# Patient Record
Sex: MALE | Race: WHITE | Employment: OTHER | RURAL
[De-identification: names, ages, dates, MRNs, and addresses within clinical notes are randomized per-mention and may not be internally consistent; named-entity substitution may affect disease eponyms.]

---

## 2017-04-04 ENCOUNTER — HOSPITAL ENCOUNTER (OUTPATIENT)
Dept: SPEECH THERAPY | Age: 72
Discharge: HOME | End: 2017-04-30
Admitting: NURSE PRACTITIONER

## 2017-04-12 ENCOUNTER — HOSPITAL ENCOUNTER (OUTPATIENT)
Dept: OTHER | Age: 72
Discharge: HOME | End: 2017-04-12
Attending: NURSE PRACTITIONER | Admitting: NURSE PRACTITIONER

## 2017-05-01 ENCOUNTER — HOSPITAL ENCOUNTER (OUTPATIENT)
Dept: OTHER | Age: 72
Discharge: HOME | End: 2017-05-31
Attending: NURSE PRACTITIONER | Admitting: NURSE PRACTITIONER

## 2017-06-01 ENCOUNTER — HOSPITAL ENCOUNTER (OUTPATIENT)
Dept: OTHER | Age: 72
Discharge: HOME | End: 2017-06-30
Attending: NURSE PRACTITIONER | Admitting: NURSE PRACTITIONER

## 2017-09-19 ENCOUNTER — HOSPITAL ENCOUNTER (OUTPATIENT)
Dept: OTHER | Age: 72
Discharge: HOME | End: 2017-09-19
Attending: NURSE PRACTITIONER | Admitting: NURSE PRACTITIONER

## 2017-09-26 ENCOUNTER — HOSPITAL ENCOUNTER (OUTPATIENT)
Dept: PHYSICAL THERAPY | Age: 72
Discharge: HOME | End: 2017-09-30
Attending: NURSE PRACTITIONER | Admitting: NURSE PRACTITIONER

## 2017-09-26 ASSESSMENT — PAIN DESCRIPTION - PROGRESSION: CLINICAL_PROGRESSION: GRADUALLY WORSENING

## 2017-09-26 ASSESSMENT — PAIN DESCRIPTION - FREQUENCY: FREQUENCY: CONTINUOUS

## 2017-09-26 ASSESSMENT — PAIN DESCRIPTION - LOCATION: LOCATION: HIP;GROIN

## 2017-09-26 ASSESSMENT — PAIN DESCRIPTION - PAIN TYPE: TYPE: CHRONIC PAIN

## 2017-09-26 ASSESSMENT — PAIN DESCRIPTION - ORIENTATION: ORIENTATION: RIGHT

## 2017-09-26 ASSESSMENT — PAIN DESCRIPTION - ONSET: ONSET: ON-GOING

## 2017-09-26 ASSESSMENT — PAIN SCALES - GENERAL: PAINLEVEL_OUTOF10: 3

## 2017-09-26 NOTE — PROGRESS NOTES
Physical Therapy  Initial Assessment  Date: 2017  Patient Name: Carlita Wilkins  MRN: [id number]  : 1945     Treatment Diagnosis: Right groin pain; tendinitis; hip joint stiffness    Restrictions  Restrictions/Precautions  Restrictions/Precautions: General Precautions  Required Braces or Orthoses?: No    Subjective   General  Chart Reviewed: Yes  Patient assessed for rehabilitation services?: Yes  Response To Previous Treatment: Not applicable  Family / Caregiver Present: No  Referring Practitioner: ISMA Sanderson  Diagnosis: Right groin pain  Follows Commands: Within Functional Limits  PT Visit Information  PT Insurance Information: [employer] Medicare  Subjective  Subjective: Reports a 2-year history of right groin pain, noting it has worsened over the past fwe months; has c/o localized right groin pain that had prior been intermittent, and is now constant in nature; pain is worse with right hip movement, especially with resistance activities; pian originally began following a heart cath procedure; recently he has had consults with his PCP and general surgeon -- notes he had a prior mesh hernia report - states his surgeon said the repair was intact; recent CT scan of this area = reports was WNl. Pain Screening  Patient Currently in Pain: Yes  Pain Assessment  Pain Assessment: 0-10  Pain Level: 3  Pain Type: Chronic pain  Pain Location: Hip;Groin  Pain Orientation: Right  Pain Descriptors: Burning;Constant; Aching; Sharp;Discomfort;Tightness  Pain Frequency: Continuous  Pain Onset: On-going  Clinical Progression: Gradually worsening  Vital Signs  Patient Currently in Pain: Yes    Vision/Hearing  Vision  Vision: Within Functional Limits  Hearing  Hearing: Within functional limits    Orientation  Orientation  Overall Orientation Status: Within Normal Limits    Social/Functional History  Social/Functional History  Lives With: Spouse  Ambulation Assistance: Independent  Transfer Assistance: treatment well         Plan   Plan  Times per week: 1-2 x week  Plan weeks: 6 weeks  Current Treatment Recommendations: Strengthening, ROM, Neuromuscular Re-education, Manual Therapy - Soft Tissue Mobilization, Manual Therapy - Joint Manipulation, Home Exercise Program, Modalities    G-Code  PT G-Codes  Functional Assessment Tool Used: LEFS  Score: 5  Functional Limitation: Mobility: Walking and moving around  Mobility: Walking and Moving Around Current Status (): At least 60 percent but less than 80 percent impaired, limited or restricted  Mobility: Walking and Moving Around Goal Status (): At least 1 percent but less than 20 percent impaired, limited or restricted      Goals  Short term goals  Time Frame for Short term goals: 2 weeks  Short term goal 1: Independent with HEP. Short term goal 2: Patient to report a 10% decrease in pain. Long term goals  Time Frame for Long term goals : 6 weeks  Long term goal 1: Achieve right groin pain at or less than 2/10, 75% of time, with ADL's and I-ADL's. Long term goal 2: Achieve 5-/5 right hip strength. Long term goal 3: Achieve a LEFS score of 20/80 or higher. Long term goal 4: Transition to a self-care HEP. Patient Goals   Patient goals : alleviate right groin pain       Therapy Time   Individual Concurrent Group Co-treatment   Time In 1138         Time Out 0431         Minutes 45                 Electronically signed by Pravin Alvarez PT on 9/26/2017 at 3:63 PM      Certification of Medical Necessity: It will be understood that this treatment plan is certified medically necessary by the documenting therapist and referring physician mentioned in this report. Unless the physician indicated otherwise through written correspondence with our office, all further referrals will act as certification of medical necessity on this treatment plan.       Thank you for this referral.  If you have questions regarding this plan of care, please call [phone].           Revisions to this plan (optional):                     Please sign and return this plan to:   FAX: [phone]      Signature:                                 Date:

## 2017-10-05 ENCOUNTER — HOSPITAL ENCOUNTER (OUTPATIENT)
Dept: PHYSICAL THERAPY | Age: 72
Discharge: HOME | End: 2017-10-05
Admitting: NURSE PRACTITIONER

## 2017-10-10 ENCOUNTER — HOSPITAL ENCOUNTER (OUTPATIENT)
Dept: PHYSICAL THERAPY | Age: 72
Discharge: HOME | End: 2017-10-10
Admitting: NURSE PRACTITIONER

## 2017-10-10 NOTE — FLOWSHEET NOTE
pending MD visit [] Discharge    Plan for Next Session:        Electronically signed by:  Electronically signed by Cat Raymundo PT on 10/10/2017 at 3:13 PM

## 2017-10-17 ENCOUNTER — HOSPITAL ENCOUNTER (OUTPATIENT)
Dept: PHYSICAL THERAPY | Age: 72
Discharge: HOME | End: 2017-10-17
Admitting: NURSE PRACTITIONER

## 2017-10-24 ENCOUNTER — HOSPITAL ENCOUNTER (OUTPATIENT)
Dept: PHYSICAL THERAPY | Age: 72
Discharge: HOME | End: 2017-10-24
Admitting: NURSE PRACTITIONER

## 2017-10-31 ENCOUNTER — HOSPITAL ENCOUNTER (OUTPATIENT)
Dept: PHYSICAL THERAPY | Age: 72
Discharge: HOME | End: 2017-10-31
Admitting: NURSE PRACTITIONER

## 2017-11-01 ENCOUNTER — HOSPITAL ENCOUNTER (OUTPATIENT)
Dept: OTHER | Age: 72
Discharge: HOME | End: 2017-11-30
Attending: NURSE PRACTITIONER | Admitting: NURSE PRACTITIONER

## 2017-11-07 ENCOUNTER — OFFICE VISIT (OUTPATIENT)
Dept: UROLOGY | Facility: CLINIC | Age: 72
End: 2017-11-07

## 2017-11-07 ENCOUNTER — HOSPITAL ENCOUNTER (OUTPATIENT)
Dept: PHYSICAL THERAPY | Age: 72
Discharge: HOME | End: 2017-11-07
Admitting: NURSE PRACTITIONER

## 2017-11-07 VITALS
HEART RATE: 62 BPM | RESPIRATION RATE: 16 BRPM | SYSTOLIC BLOOD PRESSURE: 136 MMHG | DIASTOLIC BLOOD PRESSURE: 77 MMHG | OXYGEN SATURATION: 97 % | TEMPERATURE: 98.8 F

## 2017-11-07 DIAGNOSIS — N40.0 BENIGN PROSTATIC HYPERPLASIA, UNSPECIFIED WHETHER LOWER URINARY TRACT SYMPTOMS PRESENT: Primary | ICD-10-CM

## 2017-11-07 LAB
BILIRUB BLD-MCNC: NEGATIVE MG/DL
CLARITY, POC: CLEAR
COLOR UR: YELLOW
GLUCOSE UR STRIP-MCNC: NEGATIVE MG/DL
KETONES UR QL: NEGATIVE
LEUKOCYTE EST, POC: NEGATIVE
NITRITE UR-MCNC: NEGATIVE MG/ML
PH UR: 6 [PH] (ref 5–8)
PROT UR STRIP-MCNC: NEGATIVE MG/DL
PSA SERPL-MCNC: 3.92 NG/ML (ref 0.06–4)
RBC # UR STRIP: NEGATIVE /UL
SP GR UR: 1.02 (ref 1–1.03)
UROBILINOGEN UR QL: NORMAL

## 2017-11-07 PROCEDURE — 81003 URINALYSIS AUTO W/O SCOPE: CPT | Performed by: UROLOGY

## 2017-11-07 PROCEDURE — 99213 OFFICE O/P EST LOW 20 MIN: CPT | Performed by: UROLOGY

## 2017-11-07 RX ORDER — GLIPIZIDE 5 MG/1
5 TABLET, FILM COATED, EXTENDED RELEASE ORAL 2 TIMES DAILY
COMMUNITY
Start: 2017-09-19 | End: 2020-08-10 | Stop reason: SDUPTHER

## 2017-11-07 NOTE — PROGRESS NOTES
Physical Therapy Reassessment Note   Date:  2017     TIme In:     [address] [facility]                Time Out:     4339    Patient Name:  Virginia Hart    :  1945  MRN: [id number]    Restrictions/Precautions:    Pertinent Medical History:  Medical/Treatment Diagnosis Information:  ·   Right groin pain; tendinitis; hip joint stiffness     Insurance/Certification information:    [employer]  Physician Information:    ISMA George  Plan of care signed (Y/N):    Visit# / total visits:     7/    G-Code (if applicable):      Date / Visit # G-Code Applied:    PT G-Codes  Functional Assessment Tool Used: LEFS  Score: 68  Functional Limitation: Mobility: Walking and moving around  Mobility: Walking and Moving Around Current Status (): At least 20 percent but less than 40 percent impaired, limited or restricted  Mobility: Walking and Moving Around Goal Status (): At least 1 percent but less than 20 percent impaired, limited or restricted    Progress Note: [x]  Yes  []  No  Next due by: Visit #10      Pain level: 310    Subjective:   Pt reports: he feels like he is getting better; felt ok after good compliance with HEP.      Objective:  Observation:   Test measurements:  LEFS = 68;  Strength RLE  R Hip Flexion: 5-/5  R Hip ABduction: 4+/5  R Hip ADduction: 4+/5  R Hip Internal Rotation: 4/5  R Hip External Rotation: 4/5  R Knee Flexion: 5/5  R Knee Extension: 5/5  Palpation:    Exercises:  Exercise Resistance/Repetitions Other comments   Nustep L5x8' 7   Mini squats 2x10 7   Seated right hip IR/ER x20 7   Standing hip flexor stretch     Seated groin stretch, bent knee + straight knee 4x20\" each 7   Right bent knee fallout  2 x 20    Supine hip Abd/Add AROM x15 with sliding board    Piriformis st 4x20\"    SKTC 4x20\"    HS st 4x20\"    Hip abd/add bryn 2 x 20         Recumbent bike L :                 Other Therapeutic Activities:      Manual Treatments:  Right LE LAD, PROM IR, ER right hip x 15'    Modalities:  Ice PRN. Timed Code Treatment Minutes:     48 '      Total Treatment Minutes:       62 '    Treatment/Activity Tolerance:  [x] Patient tolerated treatment well [] Patient limited by fatigue  [] Patient limited by pain  [] Patient limited by other medical complications  [] Other:     Pain after treatment:     1 /10    Prognosis: [x] Good [] Fair  [] Poor    Short term goals  Time Frame for Short term goals: 2 weeks  Short term goal 1: Independent with HEP. - met  Short term goal 2: Patient to report a 10% decrease in pain. - met  Long term goals  Time Frame for Long term goals : 6 weeks  Long term goal 1: Achieve right groin pain at or less than 2/10, 75% of time, with ADL's and I-ADL's. - Progressing toward  Long term goal 2: Achieve 5-/5 right hip strength. - progressing  Long term goal 3: Achieve a LEFS score of 20/80 or higher. - met / exceeded  Long term goal 4: Transition to a self-care HEP. Patient is showing gradual, steady progress.     Patient Requires Follow-up: [x] Yes  [] No    Plan:   [x] Continue per plan of care [] Alter current plan (see comments)  [] Plan of care initiated [] Hold pending MD visit [] Discharge    Plan for Next Session:        Electronically signed by:   Electronically signed by Katerin Greer PT on 11/7/2017 at 11:05 AM

## 2017-11-14 ENCOUNTER — HOSPITAL ENCOUNTER (OUTPATIENT)
Dept: PHYSICAL THERAPY | Age: 72
Discharge: HOME | End: 2017-11-14
Admitting: NURSE PRACTITIONER

## 2017-11-14 NOTE — PROGRESS NOTES
Physical Therapy Daily Treatment Note   Date:  2017     TIme In:     1330                Time Out:     1410    Patient Name:  Stef Collado    :  1945  MRN: [id number]    Restrictions/Precautions:    Pertinent Medical History:  Medical/Treatment Diagnosis Information:  ·   Right groin pain; tendinitis; hip joint stiffness     Insurance/Certification information:    [employer] Medicare  Physician Information:    ISMA Norman  Plan of care signed (Y/N):    Visit# / total visits:     8/    G-Code (if applicable):      Date / Visit # G-Code Applied:         Progress Note: []  Yes  [x]  No  Next due by: Visit #10      Pain level: 6/10    Subjective:   Pt reports: been having increased pain today. Objective:  Observation:   Test measurements:  LEFS = 68;  Strength RLE  R Hip Flexion: 5-/5  R Hip ABduction: 4+/5  R Hip ADduction: 4+/5  R Hip Internal Rotation: 4/5  R Hip External Rotation: 4/5  R Knee Flexion: 5/5  R Knee Extension: 5/5  Palpation:    Exercises:  Exercise Resistance/Repetitions Other comments   Nustep L5x8' 14   Mini squats 2x10 14   Seated right hip IR/ER x20 14   Standing hip flexor stretch  14   Seated groin stretch, bent knee + straight knee 4x20\" each 14   Right bent knee fallout  2 x 20 14   Supine hip Abd/Add AROM x15 with sliding board 14   Piriformis st 4x20\" 15   SKTC 4x20\" 14   HS st 4x20\" 14   Hip abd/add bryn 2 x 20 14        Recumbent bike L :                 Other Therapeutic Activities:      Manual Treatments:  Right LE LAD, PROM IR, ER right hip x 15'   (not today)    Modalities:  Ice PRN. Timed Code Treatment Minutes:     40      Total Treatment Minutes:       40    Treatment/Activity Tolerance:  [x] Patient tolerated treatment well [] Patient limited by fatigue  [] Patient limited by pain  [] Patient limited by other medical complications  [x] Other:  Manual not performed to due to pt time restraint, pt had a MD appt in [address] [facility].     Pain after treatment:      3/10    Prognosis: [x] Good [] Fair  [] Poor    Short term goals  Time Frame for Short term goals: 2 weeks  Short term goal 1: Independent with HEP. - met  Short term goal 2: Patient to report a 10% decrease in pain. - met  Long term goals  Time Frame for Long term goals : 6 weeks  Long term goal 1: Achieve right groin pain at or less than 2/10, 75% of time, with ADL's and I-ADL's. - Progressing toward  Long term goal 2: Achieve 5-/5 right hip strength. - progressing  Long term goal 3: Achieve a LEFS score of 20/80 or higher. - met / exceeded  Long term goal 4: Transition to a self-care HEP. Patient is showing gradual, steady progress.     Patient Requires Follow-up: [x] Yes  [] No    Plan:   [x] Continue per plan of care [] Alter current plan (see comments)  [] Plan of care initiated [] Hold pending MD visit [] Discharge    Plan for Next Session:        Electronically signed by:   Electronically signed by Siomara Egan PTA on 11/14/2017 at 3:02 PM

## 2017-11-21 ENCOUNTER — HOSPITAL ENCOUNTER (OUTPATIENT)
Dept: PHYSICAL THERAPY | Age: 72
Discharge: HOME | End: 2017-11-21
Admitting: NURSE PRACTITIONER

## 2017-11-29 ENCOUNTER — HOSPITAL ENCOUNTER (OUTPATIENT)
Dept: PHYSICAL THERAPY | Age: 72
Discharge: HOME | End: 2017-11-29
Admitting: NURSE PRACTITIONER

## 2017-11-29 NOTE — FLOWSHEET NOTE
Physical Therapy Daily Treatment Note   Date:  2017     TIme In:   1332                  Time Out:     [address]    Patient Name:  Darian Brannon    :  1945  MRN: [id number]    Restrictions/Precautions:    Pertinent Medical History:  Medical/Treatment Diagnosis Information:  ·   Right groin pain; tendinitis; hip joint stiffness     Insurance/Certification information:    [employer] Medicare  Physician Information:    ISMA Gonzalez  Plan of care signed (Y/N):    Visit# / total visits:     10/    G-Code (if applicable):      Date / Visit # G-Code Applied:         Progress Note: []  Yes  [x]  No  Next due by: Visit #10      Pain level: 2/10    Subjective:   Pt reports:      Objective:  Observation:   Test measurements:  LEFS = 68;  Strength RLE  R Hip Flexion: 5-/5  R Hip ABduction: 4+/5  R Hip ADduction: 4+/5  R Hip Internal Rotation: 4/5  R Hip External Rotation: 4/5  R Knee Flexion: 5/5  R Knee Extension: 5/5  Palpation:    Exercises:  Exercise Resistance/Repetitions Other comments   Nustep L5x8' 29   Mini squats 2x10 29   Seated right hip IR/ER x20 29   Standing hip flexor stretch 3x30\" 29   Seated groin stretch, bent knee + straight knee 4x20\" each 29   Right bent knee fallout  2 x 20 29   Supine hip Abd/Add AROM x15 with sliding board    Piriformis st 4x20\"    SKTC 4x20\" 29   HS st 4x20\"    Hip abd/add bryn 2 x 20         Recumbent bike L3 x 6' 29               Other Therapeutic Activities:      Manual Treatments:  Right LE LAD, PROM IR, ER right hip x 15'       Modalities:  Ice PRN. Not today per pt request.      Timed Code Treatment Minutes:     39'      Total Treatment Minutes:         50 '    Treatment/Activity Tolerance:  [x] Patient tolerated treatment well [] Patient limited by fatigue  [] Patient limited by pain  [] Patient limited by other medical complications  [x] Other: Pt completed tx with no pain and responded well to LAD.      Pain after treatment:     2 /10 Prognosis: [x] Good [] Fair  [] Poor    Short term goals  Time Frame for Short term goals: 2 weeks  Short term goal 1: Independent with HEP. - met  Short term goal 2: Patient to report a 10% decrease in pain. - met  Long term goals  Time Frame for Long term goals : 6 weeks  Long term goal 1: Achieve right groin pain at or less than 2/10, 75% of time, with ADL's and I-ADL's. - Progressing toward  Long term goal 2: Achieve 5-/5 right hip strength. - progressing  Long term goal 3: Achieve a LEFS score of 20/80 or higher. - met / exceeded  Long term goal 4: Transition to a self-care HEP. Patient is showing gradual, steady progress.     Patient Requires Follow-up: [x] Yes  [] No    Plan:   [x] Continue per plan of care [] Alter current plan (see comments)  [] Plan of care initiated [] Hold pending MD visit [] Discharge    Plan for Next Session:        Electronically signed by:   Electronically signed by Cecilio Adame PT on 11/29/2017 at 1:34 PM

## 2017-12-01 ENCOUNTER — HOSPITAL ENCOUNTER (OUTPATIENT)
Dept: OTHER | Age: 72
Discharge: HOME | End: 2017-12-31
Attending: NURSE PRACTITIONER | Admitting: NURSE PRACTITIONER

## 2017-12-05 ENCOUNTER — HOSPITAL ENCOUNTER (OUTPATIENT)
Dept: PHYSICAL THERAPY | Age: 72
Discharge: HOME | End: 2017-12-05
Admitting: NURSE PRACTITIONER

## 2017-12-05 NOTE — FLOWSHEET NOTE
Physical Therapy Daily Treatment Note   Date:  2017     TIme In:      1334               Time Out:      [address]    Patient Name:  Heidy Carbone    :  1945  MRN: [id number]    Restrictions/Precautions:    Pertinent Medical History:  Medical/Treatment Diagnosis Information:  ·   Right groin pain; tendinitis; hip joint stiffness     Insurance/Certification information:    [employer]  Physician Information:    ISMA Mccartney  Plan of care signed (Y/N):    Visit# / total visits:     10/    G-Code (if applicable):      Date / Visit # G-Code Applied:         Progress Note: []  Yes  [x]  No  Next due by: Visit #10      Pain level: 2/10    Subjective:   Pt reports: feeling some better today; still has a catch in the groin. Objective:  Observation:   Test measurements:  LEFS = 68;  Strength RLE  R Hip Flexion: 5-/5  R Hip ABduction: 4+/5  R Hip ADduction: 4+/5  R Hip Internal Rotation: 4/5  R Hip External Rotation: 4/5  R Knee Flexion: 5/5  R Knee Extension: 5/5  Palpation:    Exercises:  Exercise Resistance/Repetitions Other comments   Nustep L5x8' 5   Mini squats 2x10 5   Seated right hip IR/ER x20 5   Standing hip flexor stretch 3x30\" 5   Seated groin stretch, bent knee + straight knee 4x20\" each 5   Right bent knee fallout  2 x 20 5   Supine hip Abd/Add AROM x15 with sliding board 5   Piriformis st 4x20\" 5   SKTC 4x20\" 5   HS st 4x20\" 5   Hip abd/add bryn 2 x 20 5        Recumbent bike L5 x 6' 5               Other Therapeutic Activities:      Manual Treatments:  Right LE LAD, PROM IR, ER right hip x 10'       Modalities:  Ice PRN.   Not today per pt request.      Timed Code Treatment Minutes:   36  '      Total Treatment Minutes:         55 '    Treatment/Activity Tolerance:  [x] Patient tolerated treatment well [] Patient limited by fatigue  [] Patient limited by pain  [] Patient limited by other medical complications  [x] Other: Pt completed tx with no pain and responded well to

## 2017-12-07 ENCOUNTER — HOSPITAL ENCOUNTER (OUTPATIENT)
Dept: PHYSICAL THERAPY | Age: 72
Discharge: HOME | End: 2017-12-07
Admitting: NURSE PRACTITIONER

## 2017-12-07 NOTE — PROGRESS NOTES
Physical Therapy Daily Reassessment Note   Date:  2017     TIme In:  1401                   Time Out:      [address] [facility]    Patient Name:  Steffi Landers    :  1945  MRN: [id number]    Restrictions/Precautions:    Pertinent Medical History:  Medical/Treatment Diagnosis Information:  ·   Right groin pain; tendinitis; hip joint stiffness     Insurance/Certification information:    [employer] Medicare  Physician Information:    ISMA Trinh  Plan of care signed (Y/N):    Visit# / total visits:     -Code (if applicable):      Date / Visit # G-Code Applied:         Progress Note: []  Yes  [x]  No  Next due by: Visit #10      Pain level: 1/10    Subjective:   Pt reports: feeling some better today; still has a catch in the groin. Objective:  Observation:   Test measurements:  LEFS = 64;  Strength RLE  R Hip Flexion: 5-/5  R Hip ABduction: 4+/5  R Hip ADduction: 4+/5  R Hip Internal Rotation: 4/5  R Hip External Rotation: 4/5  R Knee Flexion: 5/5  R Knee Extension: 5/5  Palpation:    Exercises:  Exercise Resistance/Repetitions Other comments   Nustep L5x8' 7   Mini squats 2x10 7   Seated right hip IR/ER x20 + yellow t-band 7   Standing hip flexor stretch 3x30\" 7   Seated groin stretch, bent knee + straight knee 4x20\" each 7   Right bent knee fallout  2 x 20 7   Supine hip Abd/Add AROM x15 with sliding board 7   Piriformis st 4x20\" 7   SKTC 4x20\" 7   HS st 4x20\" 7   Hip abd/add bryn 2 x 20 7        Recumbent bike L5 x 6' 7               Other Therapeutic Activities:      Manual Treatments:  Right LE LAD, PROM IR, ER right hip x 10'       Modalities:  Ice PRN.   Not today per pt request.      Timed Code Treatment Minutes:   48  '      Total Treatment Minutes:         54 '    Treatment/Activity Tolerance:  [x] Patient tolerated treatment well [] Patient limited by fatigue  [] Patient limited by pain  [] Patient limited by other medical complications  [x] Other: Pt completed tx with no pain and responded well to LAD. Pain after treatment:     1 /10     Prognosis: [x] Good [] Fair  [] Poor    Short term goals  Time Frame for Short term goals: 2 weeks  Short term goal 1: Independent with HEP. - met  Short term goal 2: Patient to report a 10% decrease in pain. - met  Long term goals  Time Frame for Long term goals : 6 weeks  Long term goal 1: Achieve right groin pain at or less than 2/10, 75% of time, with ADL's and I-ADL's. - Progressing toward  Long term goal 2: Achieve 5-/5 right hip strength. - progressing  Long term goal 3: Achieve a LEFS score of 20/80 or higher. - met / exceeded  Long term goal 4: Transition to a self-care HEP. Patient is showing gradual, steady progress.     Patient Requires Follow-up: [x] Yes  [] No    Plan:   [x] Continue per plan of care [] Alter current plan (see comments)  [] Plan of care initiated [] Hold pending MD visit [] Discharge    Plan for Next Session:        Electronically signed by:   Electronically signed by Antonina Benedict PT on 12/7/2017 at 2:02 PM

## 2017-12-14 ENCOUNTER — HOSPITAL ENCOUNTER (OUTPATIENT)
Dept: PHYSICAL THERAPY | Age: 72
Discharge: HOME | End: 2017-12-14
Admitting: NURSE PRACTITIONER

## 2017-12-14 NOTE — PROGRESS NOTES
Physical Therapy Daily Reassessment Note   Date:  2017     TIme In:  1330                   Time Out:      [address]    Patient Name:  Malu Banerjee    :  1945  MRN: [id number]    Restrictions/Precautions:    Pertinent Medical History:  Medical/Treatment Diagnosis Information:  ·   Right groin pain; tendinitis; hip joint stiffness     Insurance/Certification information:    [employer] Medicare  Physician Information:    Rolm Fothergill, APRN  Plan of care signed (Y/N):    Visit# / total visits:     12/    G-Code (if applicable):      Date / Visit # G-Code Applied:         Progress Note: []  Yes  [x]  No  Next due by: Visit #10      Pain level: 0/10    Subjective:   Pt reports: feeling some better today; still has a catch in the groin. Objective:  Observation:   Test measurements:  LEFS = 64;  Strength RLE  R Hip Flexion: 5-/5  R Hip ABduction: 4+/5  R Hip ADduction: 4+/5  R Hip Internal Rotation: 4/5  R Hip External Rotation: 4/5  R Knee Flexion: 5/5  R Knee Extension: 5/5  Palpation:    Exercises:  Exercise Resistance/Repetitions Other comments   Nustep L5x8' 14   Mini squats 2x10 14   Seated right hip IR/ER x20 + yellow t-band 14   Standing hip flexor stretch 3x30\" 14   Seated groin stretch, bent knee + straight knee 4x20\" each 14   Right bent knee fallout  2 x 20 14   Supine hip Abd/Add AROM x15 with sliding board 14   Piriformis st 4x20\" 15   SKTC 4x20\" 14   HS st 4x20\" 14   Hip abd/add bryn 2 x 20 14        Recumbent bike L5 x 6' 14               Other Therapeutic Activities:      Manual Treatments:  Right LE LAD, PROM IR, ER right hip x 10'       Modalities:  Ice PRN.   Not today per pt request.      Timed Code Treatment Minutes:   48  '      Total Treatment Minutes:         46 '    Treatment/Activity Tolerance:  [x] Patient tolerated treatment well [] Patient limited by fatigue  [] Patient limited by pain  [] Patient limited by other medical complications  [x] Other: Pt completed tx with

## 2017-12-15 ENCOUNTER — HOSPITAL ENCOUNTER (OUTPATIENT)
Dept: PHYSICAL THERAPY | Age: 72
Discharge: HOME | End: 2017-12-15
Admitting: NURSE PRACTITIONER

## 2017-12-15 NOTE — PROGRESS NOTES
Physical Therapy Daily Note   Date:  12/15/2017     TIme In:  1058                   Time Out:      1156    Patient Name:  Nina High    :  1945  MRN: [id number]    Restrictions/Precautions:    Pertinent Medical History:  Medical/Treatment Diagnosis Information:  ·   Right groin pain; tendinitis; hip joint stiffness     Insurance/Certification information:    [employer] P[employer] Medicare  Physician Information:    ISMA Cortes  Plan of care signed (Y/N):    Visit# / total visits:     13/    G-Code (if applicable):      Date / Visit # G-Code Applied:         Progress Note: []  Yes  [x]  No  Next due by: Visit #10      Pain level: 10    Subjective:   Pt reports his leg is ok but he could really feel the stretches today. Objective:  Observation:   Test measurements:  LEFS = 64;  Strength RLE  R Hip Flexion: 5-/5  R Hip ABduction: 4+/5  R Hip ADduction: 4+/5  R Hip Internal Rotation: 4/5  R Hip External Rotation: 4/5  R Knee Flexion: 5/5  R Knee Extension: 5/5  Palpation:    Exercises:  Exercise Resistance/Repetitions Other comments   Nustep L5x8' 15   Mini squats 2x10 15   Seated right hip IR/ER x20 + yellow t-band 15   Standing hip flexor stretch 3x30\" 15   Seated groin stretch, bent knee + straight knee 4x20\" each 15   Right bent knee fallout  2 x 20 15   Supine hip Abd/Add AROM x15 with sliding board 15   Piriformis st 4x20\" 13   SKTC 4x20\" 15   HS st 4x20\" 15   Hip abd/add bryn 2 x 20 15        Recumbent bike L5 x 6' 15               Other Therapeutic Activities:      Manual Treatments:  Right LE LAD, PROM IR, ER right hip x 10'       Modalities:  Ice PRN.   Not today per pt request.      Timed Code Treatment Minutes:   56      Total Treatment Minutes:       58      Treatment/Activity Tolerance:  [x] Patient tolerated treatment well [] Patient limited by fatigue  [] Patient limited by pain  [] Patient limited by other medical complications  [x] Other: Pt completed tx with min c/o pain and responded well to manual tx. Pain after treatment:     1/10     Prognosis: [x] Good [] Fair  [] Poor    Short term goals  Time Frame for Short term goals: 2 weeks  Short term goal 1: Independent with HEP. - met  Short term goal 2: Patient to report a 10% decrease in pain. - met  Long term goals  Time Frame for Long term goals : 6 weeks  Long term goal 1: Achieve right groin pain at or less than 2/10, 75% of time, with ADL's and I-ADL's. - Progressing toward  Long term goal 2: Achieve 5-/5 right hip strength. - progressing  Long term goal 3: Achieve a LEFS score of 20/80 or higher. - met / exceeded  Long term goal 4: Transition to a self-care HEP.     Patient Requires Follow-up: [x] Yes  [] No    Plan:   [x] Continue per plan of care [] Alter current plan (see comments)  [] Plan of care initiated [] Hold pending MD visit [] Discharge    Plan for Next Session:        Electronically signed by:   Electronically signed by Jesu Key PTA on 12/15/2017 at 11:24 AM

## 2017-12-19 ENCOUNTER — HOSPITAL ENCOUNTER (OUTPATIENT)
Dept: PHYSICAL THERAPY | Age: 72
Discharge: HOME | End: 2017-12-19
Admitting: NURSE PRACTITIONER

## 2017-12-19 NOTE — FLOWSHEET NOTE
Physical Therapy Daily Note   Date:  2017     TIme In:  1351                   Time Out:     [address]    Patient Name:  Nellie Layne    :  1945  MRN: [id number]    Restrictions/Precautions:    Pertinent Medical History:  Medical/Treatment Diagnosis Information:  ·   Right groin pain; tendinitis; hip joint stiffness     Insurance/Certification information:    [employer] Medicare  Physician Information:    ISMA Menjivar  Plan of care signed (Y/N):    Visit# / total visits:     14/    G-Code (if applicable):      Date / Visit # G-Code Applied:         Progress Note: []  Yes  [x]  No  Next due by: Visit #10      Pain level: 10    Subjective:   Pt reports his leg is ok but he could really feel the stretches today. Objective:  Observation:   Test measurements:  LEFS = 64;  Strength RLE  R Hip Flexion: 5-/5  R Hip ABduction: 4+/5  R Hip ADduction: 4+/5  R Hip Internal Rotation: 4/5  R Hip External Rotation: 4/5  R Knee Flexion: 5/5  R Knee Extension: 5/5  Palpation:    Exercises:  Exercise Resistance/Repetitions Other comments   Nustep L5x8' 19   Mini squats 2x10 19   Seated right hip IR/ER x20 + yellow t-band 19   Standing hip flexor stretch 3x30\" 19   Seated groin stretch, bent knee + straight knee 4x20\" each 19   Right bent knee fallout  2 x 20 19   Supine hip Abd/Add AROM x15 with sliding board    Piriformis st 4x20\" 23   SKTC 4x20\"    HS st 4x20\"    Hip abd/add bryn 2 x 20 19        Recumbent bike L5 x 6' 19               Other Therapeutic Activities:      Manual Treatments:  Right LE LAD, PROM IR, ER right hip x 10'       Modalities:  Ice PRN.   Not today per pt request.      Timed Code Treatment Minutes:   48'      Total Treatment Minutes:       54      Treatment/Activity Tolerance:  [x] Patient tolerated treatment well [] Patient limited by fatigue  [] Patient limited by pain  [] Patient limited by other medical complications  [x] Other: Pt completed tx with min c/o pain and responded well to manual tx. Pain after treatment:     1/10     Prognosis: [x] Good [] Fair  [] Poor    Short term goals  Time Frame for Short term goals: 2 weeks  Short term goal 1: Independent with HEP. - met  Short term goal 2: Patient to report a 10% decrease in pain. - met  Long term goals  Time Frame for Long term goals : 6 weeks  Long term goal 1: Achieve right groin pain at or less than 2/10, 75% of time, with ADL's and I-ADL's. - Progressing toward  Long term goal 2: Achieve 5-/5 right hip strength. - progressing  Long term goal 3: Achieve a LEFS score of 20/80 or higher. - met / exceeded  Long term goal 4: Transition to a self-care HEP.     Patient Requires Follow-up: [x] Yes  [] No    Plan:   [x] Continue per plan of care [] Alter current plan (see comments)  [] Plan of care initiated [] Hold pending MD visit [] Discharge    Plan for Next Session:        Electronically signed by:   Electronically signed by Sanjuanita White PT on 12/19/2017 at 2:57 PM

## 2017-12-21 ENCOUNTER — HOSPITAL ENCOUNTER (OUTPATIENT)
Dept: PHYSICAL THERAPY | Age: 72
Discharge: HOME | End: 2017-12-21
Admitting: NURSE PRACTITIONER

## 2018-01-01 ENCOUNTER — HOSPITAL ENCOUNTER (OUTPATIENT)
Dept: OTHER | Age: 73
Discharge: HOME | End: 2018-01-31
Attending: NURSE PRACTITIONER | Admitting: NURSE PRACTITIONER

## 2018-01-24 ENCOUNTER — HOSPITAL ENCOUNTER (OUTPATIENT)
Dept: OTHER | Age: 73
Discharge: HOME | End: 2018-01-24
Attending: NURSE PRACTITIONER | Admitting: NURSE PRACTITIONER

## 2018-06-25 ENCOUNTER — OFFICE VISIT (OUTPATIENT)
Dept: ORTHOPEDIC SURGERY | Facility: CLINIC | Age: 73
End: 2018-06-25

## 2018-06-25 ENCOUNTER — APPOINTMENT (OUTPATIENT)
Dept: PREADMISSION TESTING | Facility: HOSPITAL | Age: 73
End: 2018-06-25

## 2018-06-25 ENCOUNTER — HOSPITAL ENCOUNTER (OUTPATIENT)
Dept: GENERAL RADIOLOGY | Facility: HOSPITAL | Age: 73
Discharge: HOME | End: 2018-06-25
Admitting: ORTHOPAEDIC SURGERY

## 2018-06-25 ENCOUNTER — PREP FOR SURGERY (OUTPATIENT)
Dept: OTHER | Facility: HOSPITAL | Age: 73
End: 2018-06-25

## 2018-06-25 VITALS — BODY MASS INDEX: 35.65 KG/M2 | HEIGHT: 73 IN | WEIGHT: 269 LBS

## 2018-06-25 VITALS — WEIGHT: 269 LBS | HEIGHT: 75 IN | BODY MASS INDEX: 33.45 KG/M2 | RESPIRATION RATE: 18 BRPM

## 2018-06-25 DIAGNOSIS — Z01.818 PREOP EXAMINATION: Primary | ICD-10-CM

## 2018-06-25 DIAGNOSIS — S52.552A OTHER CLOSED EXTRA-ARTICULAR FRACTURE OF DISTAL END OF LEFT RADIUS, INITIAL ENCOUNTER: Primary | ICD-10-CM

## 2018-06-25 DIAGNOSIS — Z01.818 PREOP EXAMINATION: ICD-10-CM

## 2018-06-25 LAB
ALBUMIN SERPL-MCNC: 4.1 G/DL (ref 3.5–5)
ALBUMIN/GLOB SERPL: 1.3 G/DL (ref 1–2)
ALP SERPL-CCNC: 112 U/L (ref 38–126)
ALT SERPL W P-5'-P-CCNC: 140 U/L (ref 13–69)
ANION GAP SERPL CALCULATED.3IONS-SCNC: 12.7 MMOL/L (ref 10–20)
AST SERPL-CCNC: 101 U/L (ref 15–46)
BASOPHILS # BLD AUTO: 0.02 10*3/MM3 (ref 0–0.2)
BASOPHILS NFR BLD AUTO: 0.3 % (ref 0–2.5)
BILIRUB SERPL-MCNC: 1.4 MG/DL (ref 0.2–1.3)
BUN BLD-MCNC: 31 MG/DL (ref 7–20)
BUN/CREAT SERPL: 23.8 (ref 6.3–21.9)
CALCIUM SPEC-SCNC: 8.9 MG/DL (ref 8.4–10.2)
CHLORIDE SERPL-SCNC: 104 MMOL/L (ref 98–107)
CO2 SERPL-SCNC: 29 MMOL/L (ref 26–30)
CREAT BLD-MCNC: 1.3 MG/DL (ref 0.6–1.3)
DEPRECATED RDW RBC AUTO: 44.5 FL (ref 37–54)
EOSINOPHIL # BLD AUTO: 0.22 10*3/MM3 (ref 0–0.7)
EOSINOPHIL NFR BLD AUTO: 2.9 % (ref 0–7)
ERYTHROCYTE [DISTWIDTH] IN BLOOD BY AUTOMATED COUNT: 13.9 % (ref 11.5–14.5)
GFR SERPL CREATININE-BSD FRML MDRD: 54 ML/MIN/1.73
GLOBULIN UR ELPH-MCNC: 3.2 GM/DL
GLUCOSE BLD-MCNC: 85 MG/DL (ref 74–98)
HCT VFR BLD AUTO: 40.5 % (ref 42–52)
HGB BLD-MCNC: 13.4 G/DL (ref 14–18)
IMM GRANULOCYTES # BLD: 0.05 10*3/MM3 (ref 0–0.06)
IMM GRANULOCYTES NFR BLD: 0.7 % (ref 0–0.6)
LYMPHOCYTES # BLD AUTO: 0.83 10*3/MM3 (ref 0.6–3.4)
LYMPHOCYTES NFR BLD AUTO: 10.9 % (ref 10–50)
MCH RBC QN AUTO: 29.3 PG (ref 27–31)
MCHC RBC AUTO-ENTMCNC: 33.1 G/DL (ref 30–37)
MCV RBC AUTO: 88.6 FL (ref 80–94)
MONOCYTES # BLD AUTO: 0.68 10*3/MM3 (ref 0–0.9)
MONOCYTES NFR BLD AUTO: 9 % (ref 0–12)
NEUTROPHILS # BLD AUTO: 5.79 10*3/MM3 (ref 2–6.9)
NEUTROPHILS NFR BLD AUTO: 76.2 % (ref 37–80)
NRBC BLD MANUAL-RTO: 0 /100 WBC (ref 0–0)
PLATELET # BLD AUTO: 239 10*3/MM3 (ref 130–400)
PMV BLD AUTO: 10.4 FL (ref 6–12)
POTASSIUM BLD-SCNC: 3.7 MMOL/L (ref 3.5–5.1)
PROT SERPL-MCNC: 7.3 G/DL (ref 6.3–8.2)
RBC # BLD AUTO: 4.57 10*6/MM3 (ref 4.7–6.1)
SODIUM BLD-SCNC: 142 MMOL/L (ref 137–145)
WBC NRBC COR # BLD: 7.59 10*3/MM3 (ref 4.8–10.8)

## 2018-06-25 PROCEDURE — 87081 CULTURE SCREEN ONLY: CPT | Performed by: ORTHOPAEDIC SURGERY

## 2018-06-25 PROCEDURE — 87077 CULTURE AEROBIC IDENTIFY: CPT | Performed by: ORTHOPAEDIC SURGERY

## 2018-06-25 PROCEDURE — 29125 APPL SHORT ARM SPLINT STATIC: CPT | Performed by: ORTHOPAEDIC SURGERY

## 2018-06-25 PROCEDURE — 93005 ELECTROCARDIOGRAM TRACING: CPT | Performed by: ORTHOPAEDIC SURGERY

## 2018-06-25 PROCEDURE — 36415 COLL VENOUS BLD VENIPUNCTURE: CPT

## 2018-06-25 PROCEDURE — 85025 COMPLETE CBC W/AUTO DIFF WBC: CPT | Performed by: ORTHOPAEDIC SURGERY

## 2018-06-25 PROCEDURE — 80053 COMPREHEN METABOLIC PANEL: CPT | Performed by: ORTHOPAEDIC SURGERY

## 2018-06-25 PROCEDURE — 99204 OFFICE O/P NEW MOD 45 MIN: CPT | Performed by: ORTHOPAEDIC SURGERY

## 2018-06-25 PROCEDURE — 71046 X-RAY EXAM CHEST 2 VIEWS: CPT

## 2018-06-25 RX ORDER — TRAMADOL HYDROCHLORIDE 50 MG/1
50 TABLET ORAL NIGHTLY PRN
Qty: 30 TABLET | Refills: 0 | Status: ON HOLD | OUTPATIENT
Start: 2018-06-25 | End: 2018-06-27 | Stop reason: SDUPTHER

## 2018-06-25 RX ORDER — RANITIDINE 150 MG/1
150 TABLET ORAL NIGHTLY
COMMUNITY
Start: 2015-06-19 | End: 2018-11-07

## 2018-06-25 RX ORDER — LANCETS 30 GAUGE
EACH MISCELLANEOUS
COMMUNITY
Start: 2014-11-05 | End: 2023-02-10

## 2018-06-25 RX ORDER — CLINDAMYCIN PHOSPHATE 900 MG/50ML
900 INJECTION, SOLUTION INTRAVENOUS
Status: CANCELLED | OUTPATIENT
Start: 2018-06-26 | End: 2018-06-27

## 2018-06-25 RX ORDER — HYDROCODONE BITARTRATE AND ACETAMINOPHEN 10; 325 MG/1; MG/1
TABLET ORAL
COMMUNITY
Start: 2018-06-24 | End: 2018-06-25

## 2018-06-25 RX ORDER — ASPIRIN 81 MG/1
81 TABLET ORAL 2 TIMES DAILY
COMMUNITY
End: 2021-08-19 | Stop reason: ALTCHOICE

## 2018-06-25 RX ORDER — ATENOLOL 100 MG/1
TABLET ORAL
COMMUNITY
Start: 2015-10-26 | End: 2018-06-25 | Stop reason: SDUPTHER

## 2018-06-25 RX ORDER — HYDROCHLOROTHIAZIDE 25 MG/1
TABLET ORAL
COMMUNITY
Start: 2015-06-18 | End: 2018-06-25 | Stop reason: SDUPTHER

## 2018-06-25 RX ORDER — LISINOPRIL 2.5 MG/1
TABLET ORAL
COMMUNITY
Start: 2015-10-26 | End: 2018-06-25 | Stop reason: SDUPTHER

## 2018-06-25 RX ORDER — GLUCOSAMINE HCL/CHONDROITIN SU 500-400 MG
CAPSULE ORAL
COMMUNITY
Start: 2014-11-05 | End: 2022-11-23

## 2018-06-25 RX ORDER — AMLODIPINE BESYLATE 10 MG/1
10 TABLET ORAL DAILY
COMMUNITY
End: 2020-10-08 | Stop reason: SDUPTHER

## 2018-06-25 RX ORDER — LOSARTAN POTASSIUM 100 MG/1
100 TABLET ORAL
COMMUNITY
End: 2020-12-15 | Stop reason: SDUPTHER

## 2018-06-25 RX ORDER — GLIPIZIDE 5 MG/1
5 TABLET ORAL
COMMUNITY
End: 2018-06-25 | Stop reason: SDUPTHER

## 2018-06-25 NOTE — PROGRESS NOTES
Subjective   Patient ID: Bryan Roman is a 73 y.o. male  Pain of the Left Wrist (Patient states he fell down the stairs at home, on 06/24/18. He states he is in a lot of pain and the norco the ER gave him makes him itch so he hasn't been taking anything for pain.)             History of Present Illness    Right-hand-dominant male fell off a porch landing on his left wrist sustaining a fracture given a splint and no  elbow pain on the left.  Also has some right hand pain swelling no bruising denies right elbow pain.    Medical history positive for thoracic aneurysm repair in [place] followed on the every 6 month basis with cardiology, also has abdominal aneurysm treated nonoperatively thus far.    Review of Systems   Constitutional: Negative for fever.   HENT: Negative for voice change.    Eyes: Negative for visual disturbance.   Respiratory: Negative for shortness of breath.    Cardiovascular: Negative for chest pain.   Gastrointestinal: Negative for abdominal distention and abdominal pain.   Genitourinary: Negative for dysuria.   Musculoskeletal: Positive for arthralgias. Negative for gait problem and joint swelling.   Skin: Negative for rash.   Neurological: Negative for speech difficulty.   Hematological: Does not bruise/bleed easily.   Psychiatric/Behavioral: Negative for confusion.       Past Medical History:   Diagnosis Date   • Diabetes mellitus    • GERD (gastroesophageal reflux disease)    • Hyperlipidemia    • Hypertension         Past Surgical History:   Procedure Laterality Date   • APPENDECTOMY     • ARTERIAL ANEURYSM REPAIR     • GALLBLADDER SURGERY     • HERNIA REPAIR  1987   • KNEE SURGERY Right        Family History   Problem Relation Age of Onset   • Diabetes Maternal Grandmother        Social History     Social History   • Marital status:      Spouse name: N/A   • Number of children: N/A   • Years of education: N/A     Occupational History   • Not on file.     Social History Main  Topics   • Smoking status: Former Smoker     Quit date: 2/1/1967   • Smokeless tobacco: Never Used   • Alcohol use No   • Drug use: No   • Sexual activity: Defer     Other Topics Concern   • Not on file     Social History Narrative   • No narrative on file       I have reviewed all of the above social hx, family hx, surgical hx, medications, allergies & ROS and confirm that it is accurate.    Allergies   Allergen Reactions   • Norco [Hydrocodone-Acetaminophen] Itching   • Ampicillin Rash   • Niacin And Related Rash         Current Outpatient Prescriptions:   •  atenolol (TENORMIN) 100 MG tablet, TAKE TWO TABLETS BY MOUTH ONCE DAILY, Disp: , Rfl:   •  Blood Glucose Monitoring Suppl device, , Disp: , Rfl:   •  Glucose Blood (BLOOD GLUCOSE TEST) strip, , Disp: , Rfl:   •  hydrochlorothiazide (HYDRODIURIL) 25 MG tablet, TAKE ONE TABLET BY MOUTH ONCE DAILY, Disp: , Rfl:   •  HYDROcodone-acetaminophen (NORCO)  MG per tablet, Take  by mouth., Disp: , Rfl:   •  Lancets misc, , Disp: , Rfl:   •  lisinopril (PRINIVIL,ZESTRIL) 2.5 MG tablet, TAKE ONE TABLET BY MOUTH ONCE DAILY, Disp: , Rfl:   •  raNITIdine (ZANTAC) 150 MG tablet, Take 150 mg by mouth., Disp: , Rfl:   •  amLODIPine (NORVASC) 10 MG tablet, Take 10 mg by mouth., Disp: , Rfl:   •  aspirin 81 MG EC tablet, Take 81 mg by mouth., Disp: , Rfl:   •  atenolol (TENORMIN) 100 MG tablet, Take 200 mg by mouth daily., Disp: , Rfl:   •  atorvastatin (LIPITOR) 20 MG tablet, Take 20 mg by mouth daily., Disp: , Rfl:   •  glipiZIDE (GLUCOTROL) 5 MG ER tablet, , Disp: , Rfl:   •  glipiZIDE (GLUCOTROL) 5 MG tablet, Take 5 mg by mouth., Disp: , Rfl:   •  hydrochlorothiazide (HYDRODIURIL) 25 MG tablet, Take 25 mg by mouth daily., Disp: , Rfl:   •  lisinopril (PRINIVIL,ZESTRIL) 2.5 MG tablet, Take 2.5 mg by mouth daily., Disp: , Rfl:   •  losartan (COZAAR) 100 MG tablet, Take 100 mg by mouth., Disp: , Rfl:   •  metFORMIN (GLUCOPHAGE) 1000 MG tablet, Take 1,000 mg by mouth 2  "(two) times a day with meals., Disp: , Rfl:   •  ranitidine (ZANTAC) 150 MG tablet, Take 150 mg by mouth as needed., Disp: , Rfl:   •  traMADol (ULTRAM) 50 MG tablet, Take 1 tablet by mouth At Night As Needed (PRN PAIN)., Disp: 30 tablet, Rfl: 0  •  TRUETEST TEST test strip, 1 each by Other route as needed., Disp: , Rfl:     Objective   Resp 18   Ht 189.2 cm (74.5\")   Wt 122 kg (269 lb)   BMI 34.08 kg/m²    Physical Exam  Constitutional: Patient is oriented to person, place, and time. Patient appears well-developed and well-nourished.   HENT:Head: Normocephalic and atraumatic.   Eyes: EOM are normal. Pupils are equal, round, and reactive to light.   Neck: Normal range of motion. Neck supple.   Cardiovascular: Normal rate.    Pulmonary/Chest: Effort normal and breath sounds normal.   Abdominal: Soft.   Neurological: Patient is alert and oriented to person, place, and time.   Skin: Skin is warm and dry.   Psychiatric: Patient has a normal mood and affect.   Nursing note and vitals reviewed.       Ortho Exam   Left wrist with dorsal swelling dorsal deformity skin intact good capillary refill the fingers tendon function appears intact to the fingers, no elbow pain, forearm soft, ecchymosis and deformity consistent with Colles' type fracture left wrist.    Right wrist with some tenderness at the base of the ring and long metacarpal region at the carpal metacarpal region, no distal radial tenderness no anatomic snuffbox tenderness.  Slight tenderness on the ulnar side of the ulnar styloid but no ecchymosis neurovascularly intact    Assessment/Plan   Review of Radiographic Studies:    Right wrist x-rays negative for acute fracture, left wrist confirms dorsally displaced impacted comminuted intra-articular 3 part distal radial fracture with small ulnar styloid fracture      Left short arm fiberglass splint application  Date/Time: 6/25/2018 2:57 PM  Performed by: CARLTON GELLER  Authorized by: CARLTON GELLER   Consent: " Verbal consent obtained.  Risks and benefits: risks, benefits and alternatives were discussed  Consent given by: patient  Patient understanding: patient states understanding of the procedure being performed  Patient identity confirmed: verbally with patient  Location details: left arm  Splint type: volar short arm  Supplies used: Ortho-Glass  Post-procedure: The splinted body part was neurovascularly unchanged following the procedure.  Patient tolerance: Patient tolerated the procedure well with no immediate complications           Bryan was seen today for pain.    Diagnoses and all orders for this visit:    Other closed extra-articular fracture of distal end of left radius, initial encounter  -     Cancel: XR Wrist 3+ View Right  -     XR Wrist 3+ View Bilateral    Other orders  -     traMADol (ULTRAM) 50 MG tablet; Take 1 tablet by mouth At Night As Needed (PRN PAIN).  -     Splint Application       Orthopedic activities reviewed and patient expressed appreciation, Risk, benefits, and merits of treatment alternatives reviewed with the patient and questions answered, The nature of the proposed surgery reviewed with the patient including risks, benefits, rehabilitation, recovery timeframe, and outcome expectations and Use brace as instructed      Recommendations/Plan:   Surgery: Left distal radius open reduction internal fixation with volar plate    Patient agreeable to call or return sooner for any concerns.       I discussed with the patient the diagnosis, condition, natural history and treatment alternatives, both surgical and nonsurgical.  I reviewed the surgical procedural details using models, diagrams and reviewing x-ray findings.  I explained the nature of the operation, anesthesia methods and the postoperative recovery.  I explained risks and complications including but not limited to infection, bleeding, blood clotting, poor healing, chronic pain, stiffness, failure of the procedure, possible recurrence  of the condition and anesthetic related risks.  The patient had opportunity to ask questions which were all answered to their satisfaction and decided to proceed with the plan for surgery.  I believe informed consent to proceed with the surgery was given verbally in my presence today.  The surgical consent form will be signed in the presence of the nursing staff prior to the surgery.      Impression:  Left three-part intra-articular distal radial fracture nondominant wrist, history of thoracic aneurysm repair, right wrist strain  Plan:  Left distal radius open reduction internal fixation with volar plating, Dr. Field will be seeing the patient tomorrow morning for cardiac clearance

## 2018-06-25 NOTE — DISCHARGE INSTRUCTIONS

## 2018-06-27 ENCOUNTER — APPOINTMENT (OUTPATIENT)
Dept: GENERAL RADIOLOGY | Facility: HOSPITAL | Age: 73
End: 2018-06-27
Attending: ORTHOPAEDIC SURGERY

## 2018-06-27 ENCOUNTER — ANESTHESIA (OUTPATIENT)
Dept: PERIOP | Facility: HOSPITAL | Age: 73
End: 2018-06-27

## 2018-06-27 ENCOUNTER — ANESTHESIA EVENT (OUTPATIENT)
Dept: PERIOP | Facility: HOSPITAL | Age: 73
End: 2018-06-27

## 2018-06-27 ENCOUNTER — HOSPITAL ENCOUNTER (OUTPATIENT)
Facility: HOSPITAL | Age: 73
Setting detail: HOSPITAL OUTPATIENT SURGERY
Discharge: HOME | End: 2018-06-27
Attending: ORTHOPAEDIC SURGERY | Admitting: ORTHOPAEDIC SURGERY

## 2018-06-27 VITALS
SYSTOLIC BLOOD PRESSURE: 151 MMHG | OXYGEN SATURATION: 95 % | DIASTOLIC BLOOD PRESSURE: 74 MMHG | HEART RATE: 79 BPM | WEIGHT: 269 LBS | TEMPERATURE: 97.4 F | BODY MASS INDEX: 35.65 KG/M2 | RESPIRATION RATE: 22 BRPM | HEIGHT: 73 IN

## 2018-06-27 DIAGNOSIS — Z01.818 PREOP EXAMINATION: ICD-10-CM

## 2018-06-27 PROCEDURE — C1713 ANCHOR/SCREW BN/BN,TIS/BN: HCPCS | Performed by: ORTHOPAEDIC SURGERY

## 2018-06-27 PROCEDURE — 25609 OPTX DST RD XART FX/EP SEP3+: CPT | Performed by: ORTHOPAEDIC SURGERY

## 2018-06-27 PROCEDURE — 25010000002 DEXAMETHASONE SODIUM PHOSPHATE 10 MG/ML SOLUTION: Performed by: NURSE ANESTHETIST, CERTIFIED REGISTERED

## 2018-06-27 PROCEDURE — 25010000002 FENTANYL CITRATE (PF) 100 MCG/2ML SOLUTION: Performed by: NURSE ANESTHETIST, CERTIFIED REGISTERED

## 2018-06-27 PROCEDURE — 25010000002 PROPOFOL 200 MG/20ML EMULSION: Performed by: NURSE ANESTHETIST, CERTIFIED REGISTERED

## 2018-06-27 PROCEDURE — 25010000002 MIDAZOLAM PER 1 MG: Performed by: NURSE ANESTHETIST, CERTIFIED REGISTERED

## 2018-06-27 PROCEDURE — 76000 FLUOROSCOPY <1 HR PHYS/QHP: CPT

## 2018-06-27 PROCEDURE — 25010000002 DEXAMETHASONE PER 1 MG: Performed by: NURSE ANESTHETIST, CERTIFIED REGISTERED

## 2018-06-27 PROCEDURE — 25010000002 ONDANSETRON PER 1 MG: Performed by: NURSE ANESTHETIST, CERTIFIED REGISTERED

## 2018-06-27 DEVICE — IMPLANTABLE DEVICE: Type: IMPLANTABLE DEVICE | Site: WRIST | Status: FUNCTIONAL

## 2018-06-27 RX ORDER — TRAMADOL HYDROCHLORIDE 50 MG/1
50 TABLET ORAL EVERY 6 HOURS PRN
Qty: 30 TABLET | Refills: 0 | Status: SHIPPED | OUTPATIENT
Start: 2018-06-27 | End: 2020-08-10

## 2018-06-27 RX ORDER — MIDAZOLAM HYDROCHLORIDE 1 MG/ML
INJECTION INTRAMUSCULAR; INTRAVENOUS
Status: COMPLETED
Start: 2018-06-27 | End: 2018-06-27

## 2018-06-27 RX ORDER — ONDANSETRON 2 MG/ML
4 INJECTION INTRAMUSCULAR; INTRAVENOUS ONCE AS NEEDED
Status: DISCONTINUED | OUTPATIENT
Start: 2018-06-27 | End: 2018-06-27 | Stop reason: HOSPADM

## 2018-06-27 RX ORDER — MAGNESIUM HYDROXIDE 1200 MG/15ML
LIQUID ORAL AS NEEDED
Status: DISCONTINUED | OUTPATIENT
Start: 2018-06-27 | End: 2018-06-27 | Stop reason: HOSPADM

## 2018-06-27 RX ORDER — ONDANSETRON 4 MG/1
4 TABLET, FILM COATED ORAL ONCE AS NEEDED
Status: DISCONTINUED | OUTPATIENT
Start: 2018-06-27 | End: 2018-06-27 | Stop reason: HOSPADM

## 2018-06-27 RX ORDER — DEXAMETHASONE SODIUM PHOSPHATE 10 MG/ML
INJECTION, SOLUTION INTRAMUSCULAR; INTRAVENOUS AS NEEDED
Status: DISCONTINUED | OUTPATIENT
Start: 2018-06-27 | End: 2018-06-27 | Stop reason: SURG

## 2018-06-27 RX ORDER — ONDANSETRON 2 MG/ML
INJECTION INTRAMUSCULAR; INTRAVENOUS AS NEEDED
Status: DISCONTINUED | OUTPATIENT
Start: 2018-06-27 | End: 2018-06-27 | Stop reason: SURG

## 2018-06-27 RX ORDER — MIDAZOLAM HYDROCHLORIDE 1 MG/ML
INJECTION INTRAMUSCULAR; INTRAVENOUS AS NEEDED
Status: DISCONTINUED | OUTPATIENT
Start: 2018-06-27 | End: 2018-06-27 | Stop reason: SURG

## 2018-06-27 RX ORDER — MEPERIDINE HYDROCHLORIDE 50 MG/ML
12.5 INJECTION INTRAMUSCULAR; INTRAVENOUS; SUBCUTANEOUS
Status: DISCONTINUED | OUTPATIENT
Start: 2018-06-27 | End: 2018-06-27 | Stop reason: HOSPADM

## 2018-06-27 RX ORDER — SODIUM CHLORIDE, SODIUM LACTATE, POTASSIUM CHLORIDE, CALCIUM CHLORIDE 600; 310; 30; 20 MG/100ML; MG/100ML; MG/100ML; MG/100ML
1000 INJECTION, SOLUTION INTRAVENOUS CONTINUOUS
Status: DISCONTINUED | OUTPATIENT
Start: 2018-06-27 | End: 2018-06-27 | Stop reason: HOSPADM

## 2018-06-27 RX ORDER — OXYCODONE HYDROCHLORIDE AND ACETAMINOPHEN 5; 325 MG/1; MG/1
1-2 TABLET ORAL EVERY 6 HOURS PRN
Qty: 25 TABLET | Refills: 0 | Status: SHIPPED | OUTPATIENT
Start: 2018-06-27 | End: 2018-11-07

## 2018-06-27 RX ORDER — FENTANYL CITRATE 50 UG/ML
INJECTION, SOLUTION INTRAMUSCULAR; INTRAVENOUS AS NEEDED
Status: DISCONTINUED | OUTPATIENT
Start: 2018-06-27 | End: 2018-06-27 | Stop reason: SURG

## 2018-06-27 RX ORDER — DEXAMETHASONE SODIUM PHOSPHATE 4 MG/ML
INJECTION, SOLUTION INTRA-ARTICULAR; INTRALESIONAL; INTRAMUSCULAR; INTRAVENOUS; SOFT TISSUE AS NEEDED
Status: DISCONTINUED | OUTPATIENT
Start: 2018-06-27 | End: 2018-06-27 | Stop reason: SURG

## 2018-06-27 RX ORDER — PROMETHAZINE HYDROCHLORIDE 25 MG/ML
6.25 INJECTION, SOLUTION INTRAMUSCULAR; INTRAVENOUS ONCE AS NEEDED
Status: DISCONTINUED | OUTPATIENT
Start: 2018-06-27 | End: 2018-06-27 | Stop reason: HOSPADM

## 2018-06-27 RX ORDER — PROPOFOL 10 MG/ML
INJECTION, EMULSION INTRAVENOUS AS NEEDED
Status: DISCONTINUED | OUTPATIENT
Start: 2018-06-27 | End: 2018-06-27 | Stop reason: SURG

## 2018-06-27 RX ORDER — BUPIVACAINE HCL/0.9 % NACL/PF 0.125 %
6 PREFILLED PUMP RESERVOIR EPIDURAL CONTINUOUS
Status: DISCONTINUED | OUTPATIENT
Start: 2018-06-27 | End: 2018-06-27 | Stop reason: HOSPADM

## 2018-06-27 RX ORDER — DEXAMETHASONE SODIUM PHOSPHATE 10 MG/ML
INJECTION, SOLUTION INTRAMUSCULAR; INTRAVENOUS
Status: COMPLETED
Start: 2018-06-27 | End: 2018-06-27

## 2018-06-27 RX ORDER — PROMETHAZINE HYDROCHLORIDE 25 MG/1
25 TABLET ORAL ONCE AS NEEDED
Status: DISCONTINUED | OUTPATIENT
Start: 2018-06-27 | End: 2018-06-27 | Stop reason: HOSPADM

## 2018-06-27 RX ORDER — BUPIVACAINE HYDROCHLORIDE 5 MG/ML
INJECTION, SOLUTION EPIDURAL; INTRACAUDAL AS NEEDED
Status: DISCONTINUED | OUTPATIENT
Start: 2018-06-27 | End: 2018-06-27 | Stop reason: SURG

## 2018-06-27 RX ORDER — BUPIVACAINE HYDROCHLORIDE 5 MG/ML
INJECTION, SOLUTION EPIDURAL; INTRACAUDAL
Status: COMPLETED
Start: 2018-06-27 | End: 2018-06-27

## 2018-06-27 RX ORDER — ALBUTEROL SULFATE 2.5 MG/3ML
2.5 SOLUTION RESPIRATORY (INHALATION) ONCE AS NEEDED
Status: DISCONTINUED | OUTPATIENT
Start: 2018-06-27 | End: 2018-06-27 | Stop reason: HOSPADM

## 2018-06-27 RX ORDER — CLINDAMYCIN PHOSPHATE 900 MG/50ML
900 INJECTION, SOLUTION INTRAVENOUS
Status: COMPLETED | OUTPATIENT
Start: 2018-06-27 | End: 2018-06-27

## 2018-06-27 RX ORDER — SODIUM CHLORIDE 0.9 % (FLUSH) 0.9 %
3 SYRINGE (ML) INJECTION AS NEEDED
Status: DISCONTINUED | OUTPATIENT
Start: 2018-06-27 | End: 2018-06-27 | Stop reason: HOSPADM

## 2018-06-27 RX ORDER — OXYCODONE AND ACETAMINOPHEN 7.5; 325 MG/1; MG/1
1 TABLET ORAL EVERY 4 HOURS PRN
Status: DISCONTINUED | OUTPATIENT
Start: 2018-06-27 | End: 2018-06-27 | Stop reason: HOSPADM

## 2018-06-27 RX ORDER — MIDAZOLAM HYDROCHLORIDE 1 MG/ML
1 INJECTION INTRAMUSCULAR; INTRAVENOUS
Status: DISCONTINUED | OUTPATIENT
Start: 2018-06-27 | End: 2018-06-27 | Stop reason: HOSPADM

## 2018-06-27 RX ORDER — PROMETHAZINE HYDROCHLORIDE 25 MG/1
25 SUPPOSITORY RECTAL ONCE AS NEEDED
Status: DISCONTINUED | OUTPATIENT
Start: 2018-06-27 | End: 2018-06-27 | Stop reason: HOSPADM

## 2018-06-27 RX ORDER — FENTANYL CITRATE 50 UG/ML
INJECTION, SOLUTION INTRAMUSCULAR; INTRAVENOUS
Status: COMPLETED
Start: 2018-06-27 | End: 2018-06-27

## 2018-06-27 RX ADMIN — SODIUM CHLORIDE, POTASSIUM CHLORIDE, SODIUM LACTATE AND CALCIUM CHLORIDE: 600; 310; 30; 20 INJECTION, SOLUTION INTRAVENOUS at 11:50

## 2018-06-27 RX ADMIN — EPHEDRINE SULFATE 10 MG: 50 INJECTION INTRAMUSCULAR; INTRAVENOUS; SUBCUTANEOUS at 11:56

## 2018-06-27 RX ADMIN — MIDAZOLAM HYDROCHLORIDE 1 MG: 1 INJECTION, SOLUTION INTRAMUSCULAR; INTRAVENOUS at 11:30

## 2018-06-27 RX ADMIN — SODIUM CHLORIDE, POTASSIUM CHLORIDE, SODIUM LACTATE AND CALCIUM CHLORIDE: 600; 310; 30; 20 INJECTION, SOLUTION INTRAVENOUS at 11:23

## 2018-06-27 RX ADMIN — DEXAMETHASONE SODIUM PHOSPHATE 5 MG: 10 INJECTION, SOLUTION INTRAMUSCULAR; INTRAVENOUS at 11:11

## 2018-06-27 RX ADMIN — CLINDAMYCIN PHOSPHATE 900 MG: 900 INJECTION, SOLUTION INTRAVENOUS at 11:25

## 2018-06-27 RX ADMIN — FENTANYL CITRATE 50 MCG: 50 INJECTION, SOLUTION INTRAMUSCULAR; INTRAVENOUS at 11:30

## 2018-06-27 RX ADMIN — FENTANYL CITRATE 50 MCG: 50 INJECTION, SOLUTION INTRAMUSCULAR; INTRAVENOUS at 10:43

## 2018-06-27 RX ADMIN — ONDANSETRON 4 MG: 2 INJECTION INTRAMUSCULAR; INTRAVENOUS at 11:39

## 2018-06-27 RX ADMIN — PROPOFOL 200 MG: 10 INJECTION, EMULSION INTRAVENOUS at 11:30

## 2018-06-27 RX ADMIN — MIDAZOLAM HYDROCHLORIDE 1 MG: 1 INJECTION, SOLUTION INTRAMUSCULAR; INTRAVENOUS at 10:43

## 2018-06-27 RX ADMIN — DEXAMETHASONE SODIUM PHOSPHATE 4 MG: 4 INJECTION, SOLUTION INTRAMUSCULAR; INTRAVENOUS at 11:39

## 2018-06-27 RX ADMIN — Medication 6 ML/HR: at 13:33

## 2018-06-27 RX ADMIN — BUPIVACAINE HYDROCHLORIDE 25 ML: 5 INJECTION, SOLUTION EPIDURAL; INTRACAUDAL; PERINEURAL at 11:11

## 2018-06-27 NOTE — ANESTHESIA PREPROCEDURE EVALUATION
Anesthesia Evaluation     Patient summary reviewed and Nursing notes reviewed   no history of anesthetic complications:  NPO Solid Status: > 8 hours  NPO Liquid Status: > 8 hours           Airway   Mallampati: I  TM distance: >3 FB  Neck ROM: full  no difficulty expected  Dental - normal exam     Pulmonary - normal exam   (+) a smoker Former, shortness of breath,   Cardiovascular - normal exam    Patient on routine beta blocker and Beta blocker given within 24 hours of surgery  Rhythm: regular  Rate: normal    (+) hypertension well controlled less than 2 medications, hyperlipidemia,       Neuro/Psych  (+) TIA,     GI/Hepatic/Renal/Endo    (+) obesity,  GERD well controlled,  diabetes mellitus (FSBS 119) type 2 well controlled,     Musculoskeletal     Abdominal  - normal exam    Abdomen: soft.  Bowel sounds: normal.   Substance History      OB/GYN          Other        ROS/Med Hx Other: Vocal cord paralysis post AAA repair                  Anesthesia Plan    ASA 3     general   (Risks and benefits discussed including risk of aspiration, recall and dental damage. All patient questions answered. Will continue with POC.    GA with LMA    Intraclavicular OnQ cath  Risk vs Benefits discussed with patient to include infection, bleeding at the site, paresthesia, and incomplete analgesia.  )  intravenous induction   Anesthetic plan and risks discussed with patient.

## 2018-06-27 NOTE — ANESTHESIA POSTPROCEDURE EVALUATION
Patient: Bryan Roman    Procedure Summary     Date:  06/27/18 Room / Location:  [facility] OR  /  [facility] OR    Anesthesia Start:  1126 Anesthesia Stop:  1300    Procedure:  OPEN REDUCTION INTERNAL FIXATION LEFT WRIST (Left Hand) Diagnosis:       Preop examination      (Preop examination [Z01.818])    Surgeon:  Narinder Lewis MD Provider:  Lonny Lopez CRNA    Anesthesia Type:  general ASA Status:  3          Anesthesia Type: general  Last vitals  BP   151/74 (06/27/18 1505)   Temp   97.4 °F (36.3 °C) (06/27/18 1505)   Pulse   79 (06/27/18 1505)   Resp   22 (06/27/18 1505)     SpO2   95 % (06/27/18 1505)     Anesthesia Post Evaluation

## 2018-06-27 NOTE — ANESTHESIA POSTPROCEDURE EVALUATION
Patient: Bryan Roman    Procedure Summary     Date:  06/27/18 Room / Location:  [facility] OR  /  [facility]    Anesthesia Start:  1126 Anesthesia Stop:  1300    Procedure:  OPEN REDUCTION INTERNAL FIXATION LEFT WRIST (Left Hand) Diagnosis:       Preop examination      (Preop examination [Z01.818])    Surgeon:  Narinder Lewis MD Provider:  Lonny Lopez CRNA    Anesthesia Type:  general ASA Status:  3          Anesthesia Type: general  Last vitals  BP   126/61 (06/27/18 1250)   Temp   97.6 °F (36.4 °C) (06/27/18 1240)   Pulse   65 (06/27/18 1250)   Resp   16 (06/27/18 1250)     SpO2   94 % (06/27/18 1250)     Post Anesthesia Care and Evaluation    Patient location during evaluation: PACU  Patient participation: complete - patient participated  Level of consciousness: awake and alert and sleepy but conscious  Pain score: 2  Pain management: adequate  Airway patency: patent  Anesthetic complications: No anesthetic complications  PONV Status: none  Cardiovascular status: acceptable  Respiratory status: acceptable and face mask  Hydration status: acceptable

## 2018-06-27 NOTE — ANESTHESIA PROCEDURE NOTES
Airway  Urgency: elective    Airway not difficult    General Information and Staff    Patient location during procedure: OR  CRNA: CHARLIE DOMÍNGUEZ    Indications and Patient Condition  Indications for airway management: CNS depression    Preoxygenated: yes  Mask difficulty assessment: 1 - vent by mask    Final Airway Details  Final airway type: supraglottic airway      Successful airway: classic  Size 4    Number of attempts at approach: 1

## 2018-06-27 NOTE — ANESTHESIA PROCEDURE NOTES
Peripheral Block    Patient location during procedure: pre-op  Start time: 6/27/2018 10:44 AM  Stop time: 6/27/2018 11:11 AM  Reason for block: procedure for pain, at surgeon's request and post-op pain management  Performed by  CRNA: YAHAIRA GAVIRIA  Preanesthetic Checklist  Completed: patient identified, site marked, surgical consent, pre-op evaluation, timeout performed, IV checked, risks and benefits discussed and monitors and equipment checked  Prep:  Pt Position: supine  Sterile barriers:cap, gloves, mask and sterile barriers  Prep: ChloraPrep  Patient monitoring: blood pressure monitoring, continuous pulse oximetry and EKG  Procedure  Sedation:yes    Guidance:ultrasound guided and nerve stimulator  Images:still images obtained  Loss of twitch: 0.5 mA  Laterality:left  Block Type:supraclavicular  Injection Technique:catheter  Needle Type:Tuohy  Needle Gauge:18 G  Resistance on Injection: less than 15 psi  Catheter Size:20 G  Cath Depth at skin: 6 cm  Medications  Comment:Decadron 5mg adjunct  Local Injected:bupivacaine 0.5% Local Amount Injected:1mL  Post Assessment  Injection Assessment: negative aspiration for heme, no paresthesia on injection and incremental injection  Patient Tolerance:comfortable throughout block  Complications:no  Additional Notes   Incremental injection with negative aspirate. No pain on injection. Normal injection resistance.  Vascular puncture avoided. Nerves and surrounding tissues identified with local spread around nerves visualized.

## 2018-06-29 ENCOUNTER — TELEPHONE (OUTPATIENT)
Dept: ORTHOPEDIC SURGERY | Facility: CLINIC | Age: 73
End: 2018-06-29

## 2018-06-29 DIAGNOSIS — S52.552A OTHER CLOSED EXTRA-ARTICULAR FRACTURE OF DISTAL END OF LEFT RADIUS, INITIAL ENCOUNTER: Primary | ICD-10-CM

## 2018-06-29 LAB — MRSA SPEC QL CULT: ABNORMAL

## 2018-06-29 NOTE — TELEPHONE ENCOUNTER
I have tried calling pt to make sure he knew to  and start but there's no answer and voicemail is full

## 2018-07-01 NOTE — PROGRESS NOTES
[facility]    Nerve Cath Post Op Call    Patient Name: Bryan Roman  :  1945  MRN:  [id number]  Date of Discharge: 2018    Nerve Cath Post Op Call:    Analgesia:Good  Side Effects:None  Catheter Site:clean  Catheter Plan:Patient/Family member report nerve catheter previously discontinued, tip intact    Patient called. Said pain ball was empty and the catheter was removed last night. No signs of infection or problems at the catheter site. Pt stated he has only taken one pain pill in the last 24 hours.

## 2018-07-01 NOTE — PROGRESS NOTES
[facility]    Nerve Cath Post Op Call    Patient Name: Bryan Roman  :  1945  MRN:  [id number]  Date of Discharge: 2018    Treatment Plan    Called patient number and no answer. Mailbox was full and could not accept messages. Will try another call later today.

## 2018-07-03 ENCOUNTER — OFFICE VISIT (OUTPATIENT)
Dept: ORTHOPEDIC SURGERY | Facility: CLINIC | Age: 73
End: 2018-07-03

## 2018-07-03 VITALS — WEIGHT: 262 LBS | BODY MASS INDEX: 34.72 KG/M2 | HEIGHT: 73 IN | RESPIRATION RATE: 18 BRPM

## 2018-07-03 DIAGNOSIS — S52.552D OTHER CLOSED EXTRA-ARTICULAR FRACTURE OF DISTAL END OF LEFT RADIUS WITH ROUTINE HEALING, SUBSEQUENT ENCOUNTER: Primary | ICD-10-CM

## 2018-07-03 PROCEDURE — 99024 POSTOP FOLLOW-UP VISIT: CPT | Performed by: ORTHOPAEDIC SURGERY

## 2018-07-03 PROCEDURE — 29125 APPL SHORT ARM SPLINT STATIC: CPT | Performed by: ORTHOPAEDIC SURGERY

## 2018-07-03 NOTE — PROGRESS NOTES
Subjective   Patient ID: Bryan Roman is a 73 y.o. male  Post-op and Pain of the Left Wrist (Patient is here today for a dressing change, he states he is very sore.) and Wound Check             History of Present Illness    Still complains of pain in the wrist although slightly improved since last week, has been compliant with instructions to elevate, bruising in the upper arm, no falls or injuries    Review of Systems   Constitutional: Negative for fever.   HENT: Negative for voice change.    Eyes: Negative for visual disturbance.   Respiratory: Negative for shortness of breath.    Cardiovascular: Negative for chest pain.   Gastrointestinal: Negative for abdominal distention and abdominal pain.   Genitourinary: Negative for dysuria.   Musculoskeletal: Positive for arthralgias. Negative for gait problem and joint swelling.   Skin: Negative for rash.   Neurological: Negative for speech difficulty.   Hematological: Does not bruise/bleed easily.   Psychiatric/Behavioral: Negative for confusion.       Past Medical History:   Diagnosis Date   • Aneurysm (CMS/HCC)     aortic aneyrtysm ascending and transverse still has desending  watched by dr holloway at    • Diabetes mellitus (CMS/HCC)    • Elevated cholesterol    • GERD (gastroesophageal reflux disease)    • Gout    • Hyperlipidemia    • Hypertension    • Temporal arteritis (CMS/HCC)    • TIA (transient ischemic attack)     3 years ago   • Vocal cord paralysis     left during open heart surgery        Past Surgical History:   Procedure Laterality Date   • APPENDECTOMY     • ARTERIAL ANEURYSM REPAIR     • GALLBLADDER SURGERY     • HERNIA REPAIR  1987   • KNEE SURGERY Right        Family History   Problem Relation Age of Onset   • Diabetes Maternal Grandmother        Social History     Social History   • Marital status:      Spouse name: N/A   • Number of children: N/A   • Years of education: N/A     Occupational History   • Not on file.     Social History  "Main Topics   • Smoking status: Former Smoker     Quit date: 2/1/1967   • Smokeless tobacco: Never Used   • Alcohol use No   • Drug use: No   • Sexual activity: Defer     Other Topics Concern   • Not on file     Social History Narrative   • No narrative on file       I have reviewed all of the above social hx, family hx, surgical hx, medications, allergies & ROS and confirm that it is accurate.    Allergies   Allergen Reactions   • Norco [Hydrocodone-Acetaminophen] Itching   • Ampicillin Rash   • Niacin And Related Rash         Current Outpatient Prescriptions:   •  amLODIPine (NORVASC) 10 MG tablet, Take 10 mg by mouth Daily., Disp: , Rfl:   •  aspirin 81 MG EC tablet, Take 81 mg by mouth Daily., Disp: , Rfl:   •  atenolol (TENORMIN) 100 MG tablet, Take 200 mg by mouth daily., Disp: , Rfl:   •  Blood Glucose Monitoring Suppl device, , Disp: , Rfl:   •  glipiZIDE (GLUCOTROL) 5 MG ER tablet, Take 5 mg by mouth 2 (Two) Times a Day., Disp: , Rfl:   •  Glucose Blood (BLOOD GLUCOSE TEST) strip, , Disp: , Rfl:   •  hydrochlorothiazide (HYDRODIURIL) 25 MG tablet, Take 25 mg by mouth daily., Disp: , Rfl:   •  Lancets misc, , Disp: , Rfl:   •  lisinopril (PRINIVIL,ZESTRIL) 2.5 MG tablet, Take 2.5 mg by mouth daily., Disp: , Rfl:   •  losartan (COZAAR) 100 MG tablet, Take 100 mg by mouth., Disp: , Rfl:   •  mupirocin (BACTROBAN NASAL) 2 % nasal ointment, into each nostril Daily., Disp: 1 g, Rfl: 0  •  oxyCODONE-acetaminophen (PERCOCET) 5-325 MG per tablet, Take 1-2 tablets by mouth Every 6 (Six) Hours As Needed for pain, Disp: 25 tablet, Rfl: 0  •  raNITIdine (ZANTAC) 150 MG tablet, Take 150 mg by mouth Every Night., Disp: , Rfl:   •  traMADol (ULTRAM) 50 MG tablet, Take 1 tablet by mouth Every 6 (Six) Hours As Needed for Moderate Pain ., Disp: 30 tablet, Rfl: 0  •  TRUETEST TEST test strip, 1 each by Other route as needed., Disp: , Rfl:     Objective   Resp 18   Ht 185.4 cm (73\")   Wt 119 kg (262 lb)   BMI 34.57 kg/m²  "   Physical Exam  Constitutional: Patient is oriented to person, place, and time. Patient appears well-developed and well-nourished.   HENT:Head: Normocephalic and atraumatic.   Eyes: EOM are normal. Pupils are equal, round, and reactive to light.   Neck: Normal range of motion. Neck supple.   Cardiovascular: Normal rate.    Pulmonary/Chest: Effort normal and breath sounds normal.   Abdominal: Soft.   Neurological: Patient is alert and oriented to person, place, and time.   Skin: Skin is warm and dry.   Psychiatric: Patient has a normal mood and affect.   Nursing note and vitals reviewed.       Ortho Exam   Left wrist surgical incision appears well approximated minimal drainage ecchymosis in the upper elbow arm area consistent with prior fracture good movement in the fingers with good capillary refill throughout    Assessment/Plan   Review of Radiographic Studies:    No new imaging done today.      Left short arm volar fiberglass splint application  Date/Time: 7/3/2018 9:47 AM  Performed by: CARLTON GELLER  Authorized by: CARLTON GELLER   Consent: Verbal consent obtained.  Risks and benefits: risks, benefits and alternatives were discussed  Consent given by: patient  Patient understanding: patient states understanding of the procedure being performed  Location details: left arm  Splint type: volar short arm  Supplies used: Ortho-Glass  Post-procedure: The splinted body part was neurovascularly unchanged following the procedure.  Patient tolerance: Patient tolerated the procedure well with no immediate complications           Bryan was seen today for wound check, post-op and pain.    Diagnoses and all orders for this visit:    Other closed extra-articular fracture of distal end of left radius with routine healing, subsequent encounter    Other orders  -     Splint Application       Orthopedic activities reviewed and patient expressed appreciation      Recommendations/Plan:   Brace: Postop splint    Patient  agreeable to call or return sooner for any concerns.             Impression:  Status post left distal radius volar plating  Plan:  Return next week for suture removal new short arm cast application x-rays left wrist in new cast

## 2018-07-09 DIAGNOSIS — S52.552D OTHER CLOSED EXTRA-ARTICULAR FRACTURE OF DISTAL END OF LEFT RADIUS WITH ROUTINE HEALING, SUBSEQUENT ENCOUNTER: Primary | ICD-10-CM

## 2018-07-10 ENCOUNTER — OFFICE VISIT (OUTPATIENT)
Dept: ORTHOPEDIC SURGERY | Facility: CLINIC | Age: 73
End: 2018-07-10

## 2018-07-10 VITALS — RESPIRATION RATE: 18 BRPM | WEIGHT: 262 LBS | HEIGHT: 73 IN | BODY MASS INDEX: 34.72 KG/M2

## 2018-07-10 DIAGNOSIS — S52.552D OTHER CLOSED EXTRA-ARTICULAR FRACTURE OF DISTAL END OF LEFT RADIUS WITH ROUTINE HEALING, SUBSEQUENT ENCOUNTER: Primary | ICD-10-CM

## 2018-07-10 DIAGNOSIS — Z09 SURGICAL FOLLOWUP: ICD-10-CM

## 2018-07-10 PROCEDURE — 99024 POSTOP FOLLOW-UP VISIT: CPT | Performed by: ORTHOPAEDIC SURGERY

## 2018-07-10 PROCEDURE — 29075 APPL CST ELBW FNGR SHORT ARM: CPT | Performed by: ORTHOPAEDIC SURGERY

## 2018-08-13 DIAGNOSIS — S52.552D OTHER CLOSED EXTRA-ARTICULAR FRACTURE OF DISTAL END OF LEFT RADIUS WITH ROUTINE HEALING, SUBSEQUENT ENCOUNTER: Primary | ICD-10-CM

## 2018-08-14 ENCOUNTER — OFFICE VISIT (OUTPATIENT)
Dept: ORTHOPEDIC SURGERY | Facility: CLINIC | Age: 73
End: 2018-08-14

## 2018-08-14 VITALS — RESPIRATION RATE: 18 BRPM | WEIGHT: 262 LBS | BODY MASS INDEX: 34.72 KG/M2 | HEIGHT: 73 IN

## 2018-08-14 DIAGNOSIS — S52.552D OTHER CLOSED EXTRA-ARTICULAR FRACTURE OF DISTAL END OF LEFT RADIUS WITH ROUTINE HEALING, SUBSEQUENT ENCOUNTER: Primary | ICD-10-CM

## 2018-08-14 PROCEDURE — 99024 POSTOP FOLLOW-UP VISIT: CPT | Performed by: ORTHOPAEDIC SURGERY

## 2018-08-14 NOTE — PROGRESS NOTES
Subjective   Patient ID: Bryan Roman is a 73 y.o. male  Post-op of the Left Wrist (Patient denies pain, and is eager for his cast removal.)             History of Present Illness patient is here for cast removal today very comfortable and no new complaints    Review of Systems   Constitutional: Negative for fever.   HENT: Negative for voice change.    Eyes: Negative for visual disturbance.   Respiratory: Negative for shortness of breath.    Cardiovascular: Negative for chest pain.   Gastrointestinal: Negative for abdominal distention and abdominal pain.   Genitourinary: Negative for dysuria.   Musculoskeletal: Negative for arthralgias, gait problem and joint swelling.   Skin: Negative for rash.   Neurological: Negative for speech difficulty.   Hematological: Does not bruise/bleed easily.   Psychiatric/Behavioral: Negative for confusion.       Past Medical History:   Diagnosis Date   • Aneurysm (CMS/HCC)     aortic aneyrtysm ascending and transverse still has desending  watched by dr holloway at    • Diabetes mellitus (CMS/HCC)    • Elevated cholesterol    • GERD (gastroesophageal reflux disease)    • Gout    • Hyperlipidemia    • Hypertension    • Temporal arteritis (CMS/HCC)    • TIA (transient ischemic attack)     3 years ago   • Vocal cord paralysis     left during open heart surgery        Past Surgical History:   Procedure Laterality Date   • APPENDECTOMY     • ARTERIAL ANEURYSM REPAIR     • GALLBLADDER SURGERY     • HERNIA REPAIR  1987   • KNEE SURGERY Right    • ORIF WRIST FRACTURE Left 6/27/2018    Procedure: OPEN REDUCTION INTERNAL FIXATION LEFT WRIST;  Surgeon: Narinder Lewis MD;  Location: [facility];  Service: Orthopedics       Family History   Problem Relation Age of Onset   • Diabetes Maternal Grandmother        Social History     Social History   • Marital status:      Spouse name: N/A   • Number of children: N/A   • Years of education: N/A     Occupational History   • Not on file.  "    Social History Main Topics   • Smoking status: Former Smoker     Quit date: 2/1/1967   • Smokeless tobacco: Never Used   • Alcohol use No   • Drug use: No   • Sexual activity: Defer     Other Topics Concern   • Not on file     Social History Narrative   • No narrative on file       I have reviewed all of the above social hx, family hx, surgical hx, medications, allergies & ROS and confirm that it is accurate.    Allergies   Allergen Reactions   • Norco [Hydrocodone-Acetaminophen] Itching   • Ampicillin Rash   • Niacin And Related Rash         Current Outpatient Prescriptions:   •  amLODIPine (NORVASC) 10 MG tablet, Take 10 mg by mouth Daily., Disp: , Rfl:   •  aspirin 81 MG EC tablet, Take 81 mg by mouth Daily., Disp: , Rfl:   •  atenolol (TENORMIN) 100 MG tablet, Take 200 mg by mouth daily., Disp: , Rfl:   •  Blood Glucose Monitoring Suppl device, , Disp: , Rfl:   •  glipiZIDE (GLUCOTROL) 5 MG ER tablet, Take 5 mg by mouth 2 (Two) Times a Day., Disp: , Rfl:   •  Glucose Blood (BLOOD GLUCOSE TEST) strip, , Disp: , Rfl:   •  hydrochlorothiazide (HYDRODIURIL) 25 MG tablet, Take 25 mg by mouth daily., Disp: , Rfl:   •  Lancets misc, , Disp: , Rfl:   •  lisinopril (PRINIVIL,ZESTRIL) 2.5 MG tablet, Take 2.5 mg by mouth daily., Disp: , Rfl:   •  losartan (COZAAR) 100 MG tablet, Take 100 mg by mouth., Disp: , Rfl:   •  mupirocin (BACTROBAN NASAL) 2 % nasal ointment, into each nostril Daily., Disp: 1 g, Rfl: 0  •  oxyCODONE-acetaminophen (PERCOCET) 5-325 MG per tablet, Take 1-2 tablets by mouth Every 6 (Six) Hours As Needed for pain, Disp: 25 tablet, Rfl: 0  •  raNITIdine (ZANTAC) 150 MG tablet, Take 150 mg by mouth Every Night., Disp: , Rfl:   •  traMADol (ULTRAM) 50 MG tablet, Take 1 tablet by mouth Every 6 (Six) Hours As Needed for Moderate Pain ., Disp: 30 tablet, Rfl: 0  •  TRUETEST TEST test strip, 1 each by Other route as needed., Disp: , Rfl:     Objective   Resp 18   Ht 185.4 cm (73\")   Wt 119 kg (262 lb)   " BMI 34.57 kg/m²    Physical Exam  Constitutional: Patient is oriented to person, place, and time. Patient appears well-developed and well-nourished.   HENT:Head: Normocephalic and atraumatic.   Eyes: EOM are normal. Pupils are equal, round, and reactive to light.   Neck: Normal range of motion. Neck supple.   Cardiovascular: Normal rate.    Pulmonary/Chest: Effort normal and breath sounds normal.   Abdominal: Soft.   Neurological: Patient is alert and oriented to person, place, and time.   Skin: Skin is warm and dry.   Psychiatric: Patient has a normal mood and affect.   Nursing note and vitals reviewed.       Ortho Exam   Left hand incisional area well healed minimal erythema no significant ecchymosis to his neurovascularly intact    Assessment/Plan   Review of Radiographic Studies:    Radiographic images today of fracture I personally viewed and confirm satisfactory alignment.      Procedures     Bryan was seen today for post-op.    Diagnoses and all orders for this visit:    Other closed extra-articular fracture of distal end of left radius with routine healing, subsequent encounter       Physical therapy referral given and Use brace as instructed      Recommendations/Plan:   Work/Activity Status: No contact sports    Patient agreeable to call or return sooner for any concerns.             Impression:  Healed distal radial fracture status post ORIF with volar plating  Plan:  Occupational therapy referral recheck in 4-6 weeks no x-rays necessary at that time

## 2018-08-16 ENCOUNTER — HOSPITAL ENCOUNTER (OUTPATIENT)
Dept: PHYSICAL THERAPY | Facility: HOSPITAL | Age: 73
Setting detail: THERAPIES SERIES
End: 2018-08-16
Payer: MEDICARE

## 2018-08-17 ENCOUNTER — HOSPITAL ENCOUNTER (OUTPATIENT)
Dept: PHYSICAL THERAPY | Facility: HOSPITAL | Age: 73
Setting detail: THERAPIES SERIES
Discharge: HOME | End: 2018-08-17
Payer: MEDICARE

## 2018-08-17 PROCEDURE — 97161 PT EVAL LOW COMPLEX 20 MIN: CPT

## 2018-08-17 PROCEDURE — G8985 CARRY GOAL STATUS: HCPCS

## 2018-08-17 PROCEDURE — 97110 THERAPEUTIC EXERCISES: CPT

## 2018-08-17 PROCEDURE — 97140 MANUAL THERAPY 1/> REGIONS: CPT

## 2018-08-17 PROCEDURE — G8984 CARRY CURRENT STATUS: HCPCS

## 2018-08-17 NOTE — PROGRESS NOTES
Physical Therapy  Initial Assessment  Date: 2018  Patient Name: Nita Raygoza  MRN: [id number]  : 1945     Treatment Diagnosis: s/p left radius fracture, s/p ORIF; wrist pain, joint stiffness, muscle weakness    Restrictions  Restrictions/Precautions  Restrictions/Precautions: General Precautions, Surgical Protocols  Required Braces or Orthoses?: No    Subjective   General  Chart Reviewed: Yes  Patient assessed for rehabilitation services?: Yes  Response To Previous Treatment: Not applicable  Family / Caregiver Present: No  Referring Practitioner: Martina Roberts MD  Diagnosis: S/P left distal radius fracture  Follows Commands: Within Functional Limits  PT Visit Information  PT Insurance Information: [facility] [employer] [facility] Medicare Replacement  Subjective  Subjective: Injured left wrist 19 - fell off his porch - had ORIF surgery  - plate + 10 screws; was casted and splinted x 7 weeks; c/o general achy pain, stiffness; has a 1-pound lifting restriction; notes history of left wrist fracture in past with good healing.   Pain Screening  Patient Currently in Pain: Yes  Vital Signs  Patient Currently in Pain: Yes    Vision/Hearing  Vision  Vision: Within Functional Limits  Hearing  Hearing: Within functional limits    Orientation  Orientation  Overall Orientation Status: Within Normal Limits    Social/Functional History  Social/Functional History  Active : Yes  Mode of Transportation: Car  Occupation: Retired  Objective     Observation/Palpation  Posture: Fair  Palpation: general tenderness left wrist  Observation: mild general edemal incision is well approximated; clean and dry; couple of small scabs with trace redness    AROM LUE (degrees)  L Forearm Pron 0-90: 70  L Forearm Supination  0-90: 50  L Wrist Flexion 0-80: 15  L Wrist Extension 0-70: 35  L Wrist Radial Deviation 0-20: 10  L Wrist Ulnar Deviation 0-45: 5    Strength LUE  L Elbow Flexion: 4/5  L Elbow Extension: 4/5  L Forearm Pron: 3-/5  L Forearm

## 2018-08-20 ENCOUNTER — HOSPITAL ENCOUNTER (OUTPATIENT)
Dept: PHYSICAL THERAPY | Facility: HOSPITAL | Age: 73
Setting detail: THERAPIES SERIES
Discharge: HOME | End: 2018-08-20
Payer: MEDICARE

## 2018-08-20 PROCEDURE — 97110 THERAPEUTIC EXERCISES: CPT

## 2018-08-20 PROCEDURE — 97140 MANUAL THERAPY 1/> REGIONS: CPT

## 2018-08-20 NOTE — FLOWSHEET NOTE
Physical Therapy Daily Treatment Note   Date:  2018    TIme In:  1109                    Time Out: 1143    Patient Name:  Clinton Cason    :  1945  MRN: [id number]    Restrictions/Precautions:    Pertinent Medical History:  Medical/Treatment Diagnosis Information:    s/p left radius fracture, s/p ORIF; wrist pain, joint stiffness, muscle weakness  ·    ·    Insurance/Certification information:    [employer] Medicare Replacement  Physician Information:    Marielena Ponce MD  Plan of care signed (Y/N):    Visit# / total visits:    2 /    G-Code (if applicable):      Date / Visit # G-Code Applied:         Progress Note: []  Yes  [x]  No  Next due by: Visit #10      Pain level:   0/10  Subjective: Pt reports she only has pain when he moves certain ways. He expresses that he can already move his wrist more today than he could last week. Objective:  Observation:   Test measurements:    Palpation:    Exercises:  Exercise Resistance/Repetitions Other comments   Left shoulder AROM  20   Left elbow AROM - 4-way  20   Left wrist AROM: 4-way  20   Tendon glide  20                                             Other Therapeutic Activities:      Manual Treatments:  grade 1-2 wrist mobs; gentle PROM wrist + pron, supin x 15'       Modalities:        Timed Code Treatment Minutes:  30      Total Treatment Minutes:  34    Treatment/Activity Tolerance:  [x] Patient tolerated treatment well [] Patient limited by fatigue  [] Patient limited by pain  [] Patient limited by other medical complications  [x] Other: Pt completed tx with no pain and mild tenderness during manual tx.      Pain after treatment:      0/10    Prognosis: [x] Good [] Fair  [] Poor    Patient Requires Follow-up: [x] Yes  [] No    Plan:   [x] Continue per plan of care [] Alter current plan (see comments)  [] Plan of care initiated [] Hold pending MD visit [] Discharge    Plan for Next Session:        Electronically signed by:  Clara Finley PTA

## 2018-08-22 ENCOUNTER — HOSPITAL ENCOUNTER (OUTPATIENT)
Dept: PHYSICAL THERAPY | Facility: HOSPITAL | Age: 73
Setting detail: THERAPIES SERIES
Discharge: HOME | End: 2018-08-22
Payer: MEDICARE

## 2018-08-22 PROCEDURE — 97140 MANUAL THERAPY 1/> REGIONS: CPT

## 2018-08-22 PROCEDURE — 97110 THERAPEUTIC EXERCISES: CPT

## 2018-08-22 NOTE — FLOWSHEET NOTE
Physical Therapy Daily Treatment Note   Date:  2018    TIme In:  1100                    Time Out:      1140    Patient Name:  Tenisha Samuels    :  1945  MRN: [id number]    Restrictions/Precautions:    Pertinent Medical History:  Medical/Treatment Diagnosis Information:    s/p left radius fracture, s/p ORIF; wrist pain, joint stiffness, muscle weakness  ·       Insurance/Certification information:    [employer] Medicare Replacement  Physician Information:    Marielena Ponce MD  Plan of care signed (Y/N):    Visit# / total visits:     3/    G-Code (if applicable):      Date / Visit # G-Code Applied:         Progress Note: []  Yes  [x]  No  Next due by: Visit #10      Pain level:   0/10  Subjective: Pt reports: did fine after last visit. Objective:  Observation:   Test measurements:    Palpation:    Exercises:  Exercise Resistance/Repetitions Other comments   Left shoulder AROM x20 22   Left elbow AROM - 4-way x20 22   Left wrist AROM: 4-way x20 22   Tendon glide x20 22   Sign alphabet  x2 22                                        Other Therapeutic Activities:      Manual Treatments:  grade 1-2 wrist mobs; gentle PROM wrist + pron, supin x 15'       Modalities:        Timed Code Treatment Minutes:  40      Total Treatment Minutes:  40    Treatment/Activity Tolerance:  [x] Patient tolerated treatment well [] Patient limited by fatigue  [] Patient limited by pain  [] Patient limited by other medical complications  [x] Other: Pt completed tx with no pain and mild tenderness during manual tx.      Pain after treatment:      0/10    Prognosis: [x] Good [] Fair  [] Poor    Patient Requires Follow-up: [x] Yes  [] No    Plan:   [x] Continue per plan of care [] Alter current plan (see comments)  [] Plan of care initiated [] Hold pending MD visit [] Discharge    Plan for Next Session:        Electronically signed by:  Andrade Johnson PTA

## 2018-08-28 ENCOUNTER — HOSPITAL ENCOUNTER (OUTPATIENT)
Dept: PHYSICAL THERAPY | Facility: HOSPITAL | Age: 73
Setting detail: THERAPIES SERIES
Discharge: HOME | End: 2018-08-28
Payer: MEDICARE

## 2018-08-28 PROCEDURE — 97140 MANUAL THERAPY 1/> REGIONS: CPT

## 2018-08-28 PROCEDURE — 97110 THERAPEUTIC EXERCISES: CPT

## 2018-08-28 NOTE — FLOWSHEET NOTE
Physical Therapy Daily Treatment Note   Date:  2018    TIme In:  1057                    Time Out:      1141    Patient Name:  Stefano Aguero    :  1945  MRN: [id number]    Restrictions/Precautions:    Pertinent Medical History:  Medical/Treatment Diagnosis Information:    s/p left radius fracture, s/p ORIF; wrist pain, joint stiffness, muscle weakness  ·       Insurance/Certification information:    [employer] Medicare Replacement  Physician Information:    Diane Cid MD  Plan of care signed (Y/N):    Visit# / total visits:     4/    G-Code (if applicable):      Date / Visit # G-Code Applied:         Progress Note: []  Yes  [x]  No  Next due by: Visit #10      Pain level:   0/10    Subjective: Pt reports: wrist is still sore and stiff, but getting better; dong HEP. Objective:  Observation:   Test measurements:    Palpation:    Exercises:  Exercise Resistance/Repetitions Other comments   Left shoulder AROM x20 28   Left elbow AROM - 4-way x20 28   Left wrist AROM: 4-way x20 28   Tendon glide x20 28   Sign alphabet  x2 28                                        Other Therapeutic Activities:      Manual Treatments:  grade 1-2 wrist mobs; gentle PROM wrist + pron, supin x 15'       Modalities:        Timed Code Treatment Minutes:  40      Total Treatment Minutes:  40'    Treatment/Activity Tolerance:  [x] Patient tolerated treatment well [] Patient limited by fatigue  [] Patient limited by pain  [] Patient limited by other medical complications  [x] Other: Pt completed tx with no pain and mild tenderness during manual tx.      Pain after treatment:      0/10    Prognosis: [x] Good [] Fair  [] Poor    Patient Requires Follow-up: [x] Yes  [] No    Plan:   [x] Continue per plan of care [] Alter current plan (see comments)  [] Plan of care initiated [] Hold pending MD visit [] Discharge    Plan for Next Session:        Electronically signed by:  Electronically signed by Dominguez Mckeon PT on 2018 at 10:08 AM

## 2018-08-30 ENCOUNTER — HOSPITAL ENCOUNTER (OUTPATIENT)
Dept: PHYSICAL THERAPY | Facility: HOSPITAL | Age: 73
Setting detail: THERAPIES SERIES
Discharge: HOME | End: 2018-08-30
Payer: MEDICARE

## 2018-08-30 PROCEDURE — 97110 THERAPEUTIC EXERCISES: CPT

## 2018-08-30 PROCEDURE — 97140 MANUAL THERAPY 1/> REGIONS: CPT

## 2018-09-05 ENCOUNTER — HOSPITAL ENCOUNTER (OUTPATIENT)
Dept: PHYSICAL THERAPY | Facility: HOSPITAL | Age: 73
Setting detail: THERAPIES SERIES
Discharge: HOME | End: 2018-09-05
Payer: MEDICARE

## 2018-09-05 PROCEDURE — 97140 MANUAL THERAPY 1/> REGIONS: CPT

## 2018-09-05 PROCEDURE — 97110 THERAPEUTIC EXERCISES: CPT

## 2018-09-05 NOTE — FLOWSHEET NOTE
Physical Therapy Daily Treatment Note   Date:  2018    TIme In:  1110                    Time Out:     1200    Patient Name:  Razia Arreola    :  1945  MRN: [id number]    Restrictions/Precautions:    Pertinent Medical History:  Medical/Treatment Diagnosis Information:    s/p left radius fracture, s/p ORIF; wrist pain, joint stiffness, muscle weakness  ·       Insurance/Certification information:    [employer] Medicare Replacement  Physician Information:    Susana Romeo MD  Plan of care signed (Y/N):    Visit# / total visits:     6/    G-Code (if applicable):      Date / Visit # G-Code Applied:         Progress Note: []  Yes  [x]  No  Next due by: Visit #10      Pain level:   0/10    Subjective: Pt reports: no pain but wrist is still sore and stiff. Objective:  Observation:   Test measurements:    Palpation:    Exercises:  Exercise Resistance/Repetitions Other comments   Left shoulder AROM x20 5   Left elbow AROM - 4-way x20 5   Left wrist AROM: 4-way x20 5   Tendon glide x20 5   Sign alphabet  x2 5                                        Other Therapeutic Activities:      Manual Treatments:  grade 1-2 wrist mobs; gentle PROM wrist + pron, supin x 15'       Modalities:        Timed Code Treatment Minutes:  40      Total Treatment Minutes:  50    Treatment/Activity Tolerance:  [x] Patient tolerated treatment well [] Patient limited by fatigue  [] Patient limited by pain  [] Patient limited by other medical complications  [x] Other: Pt completed tx with no pain and mild tenderness during manual tx.      Pain after treatment:      0/10    Prognosis: [x] Good [] Fair  [] Poor    Patient Requires Follow-up: [x] Yes  [] No    Plan:   [x] Continue per plan of care [] Alter current plan (see comments)  [] Plan of care initiated [] Hold pending MD visit [] Discharge    Plan for Next Session:        Electronically signed by:  Electronically signed by Veto Horne PTA on 2018 at 11:12 AM

## 2018-09-07 ENCOUNTER — HOSPITAL ENCOUNTER (OUTPATIENT)
Dept: PHYSICAL THERAPY | Facility: HOSPITAL | Age: 73
Setting detail: THERAPIES SERIES
Discharge: HOME | End: 2018-09-07
Payer: MEDICARE

## 2018-09-07 PROCEDURE — 97140 MANUAL THERAPY 1/> REGIONS: CPT

## 2018-09-07 PROCEDURE — 97110 THERAPEUTIC EXERCISES: CPT

## 2018-09-10 ENCOUNTER — HOSPITAL ENCOUNTER (OUTPATIENT)
Dept: PHYSICAL THERAPY | Facility: HOSPITAL | Age: 73
Setting detail: THERAPIES SERIES
Discharge: HOME | End: 2018-09-10
Payer: MEDICARE

## 2018-09-10 PROCEDURE — 97140 MANUAL THERAPY 1/> REGIONS: CPT

## 2018-09-10 PROCEDURE — 97110 THERAPEUTIC EXERCISES: CPT

## 2018-09-10 NOTE — FLOWSHEET NOTE
Physical Therapy Daily Treatment Note   Date:  9/10/2018    TIme In:      1100                    Time Out:     1140    Patient Name:  Gorge Monk    :  1945  MRN: [id number]    Restrictions/Precautions:    Pertinent Medical History:  Medical/Treatment Diagnosis Information:    s/p left radius fracture, s/p ORIF; wrist pain, joint stiffness, muscle weakness  ·       Insurance/Certification information:    [employer] Medicare Replacement  Physician Information:    Phyllis Bear MD  Plan of care signed (Y/N):    Visit# / total visits:     8/    G-Code (if applicable):      Date / Visit # G-Code Applied:         Progress Note: []  Yes  [x]  No  Next due by: Visit #10      Pain level:   0/10    Subjective: Pt reports: no pain but wrist is still sore and stiff. Objective:  Observation:   Test measurements:    Palpation:    Exercises:  Exercise Resistance/Repetitions Other comments   Left shoulder AROM x20 10   Left elbow AROM - 4-way x20 10   Left wrist AROM: 4-way x20 10   Tendon glide x20 10   Sign alphabet  x2 10                                        Other Therapeutic Activities:      Manual Treatments:  grade 1-2 wrist mobs; gentle PROM wrist + pron, supin x 15'       Modalities:        Timed Code Treatment Minutes:  25      Total Treatment Minutes:  40    Treatment/Activity Tolerance:  [x] Patient tolerated treatment well [] Patient limited by fatigue  [] Patient limited by pain  [] Patient limited by other medical complications  [x] Other: Pt completed tx with no pain and mild tenderness during manual tx.      Pain after treatment:      0/10    Prognosis: [x] Good [] Fair  [] Poor    Patient Requires Follow-up: [x] Yes  [] No    Plan:   [x] Continue per plan of care [] Alter current plan (see comments)  [] Plan of care initiated [] Hold pending MD visit [] Discharge    Plan for Next Session:        Electronically signed by:  Electronically signed by Pool Mesa PTA on 9/10/2018 at 11:08 AM

## 2018-09-12 ENCOUNTER — HOSPITAL ENCOUNTER (OUTPATIENT)
Dept: PHYSICAL THERAPY | Facility: HOSPITAL | Age: 73
Setting detail: THERAPIES SERIES
Discharge: HOME | End: 2018-09-12
Payer: MEDICARE

## 2018-09-12 PROCEDURE — 97140 MANUAL THERAPY 1/> REGIONS: CPT

## 2018-09-12 PROCEDURE — 97110 THERAPEUTIC EXERCISES: CPT

## 2018-09-12 NOTE — FLOWSHEET NOTE
Physical Therapy Reassessment Note   Date:  2018    TIme In:    1100                    Time Out:    1145    Patient Name:  Neris Rapp    :  1945  MRN: [id number]    Restrictions/Precautions:    Pertinent Medical History:  Medical/Treatment Diagnosis Information:    s/p left radius fracture, s/p ORIF; wrist pain, joint stiffness, muscle weakness  ·       Insurance/Certification information:    [employer] Medicare Replacement  Physician Information:    Tori Morales MD  Plan of care signed (Y/N):    Visit# / total visits:     9/    G-Code (if applicable):      Date / Visit # G-Code Applied:    PT G-Codes  Functional Assessment Tool Used: Quick DASH  Score: 39  Functional Limitation: Carrying, moving and handling objects  Carrying, Moving and Handling Objects Current Status (): At least 20 percent but less than 40 percent impaired, limited or restricted  Carrying, Moving and Handling Objects Goal Status (): At least 1 percent but less than 20 percent impaired, limited or restricted    Progress Note: []  Yes  [x]  No  Next due by: Visit #10      Pain level:   0/10    Subjective: Pt reports his elbow has gotten much better range. He expressed that he can tell he has gotten better just doing little things around the house. Objective:  Observation:   Test measurements:  Quick DASH: 38.6%, Left : 29.7#, Right : 39.5#, Wrist AROM: flex: 34 deg, ext: 54 deg. Palpation:     Exercises:  Exercise Resistance/Repetitions Other comments   Left shoulder AROM x20 12   Left elbow AROM - 4-way x20 12   Left wrist AROM: 4-way x20 12   Tendon glide x20 12   Sign alphabet  x2 12   Wrist maze x3' 12   Ball squeeze x2' 12                              Other Therapeutic Activities:  Re-assess performed by Artist Damien, PT.     Manual Treatments:  grade 1-2 wrist mobs; gentle PROM wrist + pron, supin x 15'       Modalities:        Timed Code Treatment Minutes:  40      Total Treatment Minutes:

## 2018-09-17 ENCOUNTER — HOSPITAL ENCOUNTER (OUTPATIENT)
Dept: PHYSICAL THERAPY | Facility: HOSPITAL | Age: 73
Setting detail: THERAPIES SERIES
Discharge: HOME | End: 2018-09-17
Payer: MEDICARE

## 2018-09-17 PROCEDURE — 97110 THERAPEUTIC EXERCISES: CPT

## 2018-09-17 PROCEDURE — G8984 CARRY CURRENT STATUS: HCPCS

## 2018-09-17 PROCEDURE — G8985 CARRY GOAL STATUS: HCPCS

## 2018-09-17 PROCEDURE — 97140 MANUAL THERAPY 1/> REGIONS: CPT

## 2018-09-19 ENCOUNTER — HOSPITAL ENCOUNTER (OUTPATIENT)
Dept: PHYSICAL THERAPY | Facility: HOSPITAL | Age: 73
Setting detail: THERAPIES SERIES
Discharge: HOME | End: 2018-09-19
Payer: MEDICARE

## 2018-09-19 PROCEDURE — 97110 THERAPEUTIC EXERCISES: CPT

## 2018-09-19 PROCEDURE — 97140 MANUAL THERAPY 1/> REGIONS: CPT

## 2018-09-19 NOTE — FLOWSHEET NOTE
goal 1: Achieve left wrist pain at or less than12/10 with routine daily activities. Long term goal 2: Achieve left elbow AROM: left wrist: flexion = 50, ext = 60. Long term goal 3: Achieve 4/5 left elbow and wrist strength. Long term goal 4: Achieve a Quick DASH score of 20 or less. Gradual progress; improving ROM. Treatment/Activity Tolerance:  [x] Patient tolerated treatment well [] Patient limited by fatigue  [] Patient limited by pain  [] Patient limited by other medical complications  [x] Other: Pt completed tx with no pain and improved AROM in left wrist as well as improved functional measures.     Pain after treatment:     0 /10    Prognosis: [x] Good [] Fair  [] Poor    Patient Requires Follow-up: [x] Yes  [] No    Plan:   [x] Continue per plan of care [] Alter current plan (see comments)  [] Plan of care initiated [] Hold pending MD visit [] Discharge    Plan for Next Session:        Electronically signed by:  Electronically signed by Nohelia Mooney PT on 9/19/2018 at 11:17 AM

## 2018-09-21 ENCOUNTER — OFFICE VISIT (OUTPATIENT)
Dept: ORTHOPEDIC SURGERY | Facility: CLINIC | Age: 73
End: 2018-09-21

## 2018-09-21 VITALS — WEIGHT: 270 LBS | RESPIRATION RATE: 18 BRPM | BODY MASS INDEX: 35.78 KG/M2 | HEIGHT: 73 IN

## 2018-09-21 DIAGNOSIS — S52.552D OTHER CLOSED EXTRA-ARTICULAR FRACTURE OF DISTAL END OF LEFT RADIUS WITH ROUTINE HEALING, SUBSEQUENT ENCOUNTER: Primary | ICD-10-CM

## 2018-09-21 PROCEDURE — 99024 POSTOP FOLLOW-UP VISIT: CPT | Performed by: ORTHOPAEDIC SURGERY

## 2018-09-21 RX ORDER — ROSUVASTATIN CALCIUM 20 MG/1
TABLET, COATED ORAL
Refills: 1 | COMMUNITY
Start: 2018-09-17 | End: 2020-08-10 | Stop reason: SINTOL

## 2018-09-21 NOTE — PROGRESS NOTES
Subjective   Patient ID: Bryan Roman is a 73 y.o. male  Follow-up of the Left Wrist (Patient is here today to follow up on his left distal radius orif from 06/27/18 and says he is doing great, no issues and no pain.)             History of Present Illness  He is very satisfied status post left distal radial ORIF mid much progress with therapy, minimal pain little bit of stiffness with volar flexion and the wrist otherwise has resumed normal activities      Review of Systems   Constitutional: Negative for fever.   HENT: Negative for voice change.    Eyes: Negative for visual disturbance.   Respiratory: Negative for shortness of breath.    Cardiovascular: Negative for chest pain.   Gastrointestinal: Negative for abdominal distention and abdominal pain.   Genitourinary: Negative for dysuria.   Musculoskeletal: Negative for arthralgias, gait problem and joint swelling.   Skin: Negative for rash.   Neurological: Negative for speech difficulty.   Hematological: Does not bruise/bleed easily.   Psychiatric/Behavioral: Negative for confusion.       Past Medical History:   Diagnosis Date   • Aneurysm (CMS/HCC)     aortic aneyrtysm ascending and transverse still has desending  watched by dr holloway at    • Diabetes mellitus (CMS/HCC)    • Elevated cholesterol    • GERD (gastroesophageal reflux disease)    • Gout    • Hyperlipidemia    • Hypertension    • Temporal arteritis (CMS/HCC)    • TIA (transient ischemic attack)     3 years ago   • Vocal cord paralysis     left during open heart surgery        Past Surgical History:   Procedure Laterality Date   • APPENDECTOMY     • ARTERIAL ANEURYSM REPAIR     • GALLBLADDER SURGERY     • HERNIA REPAIR  1987   • KNEE SURGERY Right    • ORIF WRIST FRACTURE Left 6/27/2018    Procedure: OPEN REDUCTION INTERNAL FIXATION LEFT WRIST;  Surgeon: Narinder Lewis MD;  Location: [facility];  Service: Orthopedics       Family History   Problem Relation Age of Onset   • Diabetes Maternal  Grandmother        Social History     Social History   • Marital status:      Spouse name: N/A   • Number of children: N/A   • Years of education: N/A     Occupational History   • Not on file.     Social History Main Topics   • Smoking status: Former Smoker     Quit date: 2/1/1967   • Smokeless tobacco: Never Used   • Alcohol use No   • Drug use: No   • Sexual activity: Defer     Other Topics Concern   • Not on file     Social History Narrative   • No narrative on file       I have reviewed all of the above social hx, family hx, surgical hx, medications, allergies & ROS and confirm that it is accurate.    Allergies   Allergen Reactions   • Norco [Hydrocodone-Acetaminophen] Itching   • Ampicillin Rash   • Niacin And Related Rash         Current Outpatient Prescriptions:   •  amLODIPine (NORVASC) 10 MG tablet, Take 10 mg by mouth Daily., Disp: , Rfl:   •  aspirin 81 MG EC tablet, Take 81 mg by mouth Daily., Disp: , Rfl:   •  atenolol (TENORMIN) 100 MG tablet, Take 200 mg by mouth daily., Disp: , Rfl:   •  Blood Glucose Monitoring Suppl device, , Disp: , Rfl:   •  glipiZIDE (GLUCOTROL) 5 MG ER tablet, Take 5 mg by mouth 2 (Two) Times a Day., Disp: , Rfl:   •  Glucose Blood (BLOOD GLUCOSE TEST) strip, , Disp: , Rfl:   •  hydrochlorothiazide (HYDRODIURIL) 25 MG tablet, Take 25 mg by mouth daily., Disp: , Rfl:   •  Lancets misc, , Disp: , Rfl:   •  lisinopril (PRINIVIL,ZESTRIL) 2.5 MG tablet, Take 2.5 mg by mouth daily., Disp: , Rfl:   •  losartan (COZAAR) 100 MG tablet, Take 100 mg by mouth., Disp: , Rfl:   •  mupirocin (BACTROBAN NASAL) 2 % nasal ointment, into each nostril Daily., Disp: 1 g, Rfl: 0  •  oxyCODONE-acetaminophen (PERCOCET) 5-325 MG per tablet, Take 1-2 tablets by mouth Every 6 (Six) Hours As Needed for pain, Disp: 25 tablet, Rfl: 0  •  raNITIdine (ZANTAC) 150 MG tablet, Take 150 mg by mouth Every Night., Disp: , Rfl:   •  rosuvastatin (CRESTOR) 20 MG tablet, , Disp: , Rfl: 1  •  traMADol (ULTRAM)  "50 MG tablet, Take 1 tablet by mouth Every 6 (Six) Hours As Needed for Moderate Pain ., Disp: 30 tablet, Rfl: 0  •  TRUETEST TEST test strip, 1 each by Other route as needed., Disp: , Rfl:     Objective   Resp 18   Ht 185.4 cm (73\")   Wt 122 kg (270 lb)   BMI 35.62 kg/m²    Physical Exam  Constitutional: Patient is oriented to person, place, and time. Patient appears well-developed and well-nourished.   HENT:Head: Normocephalic and atraumatic.   Eyes: EOM are normal. Pupils are equal, round, and reactive to light.   Neck: Normal range of motion. Neck supple.   Cardiovascular: Normal rate.    Pulmonary/Chest: Effort normal and breath sounds normal.   Abdominal: Soft.   Neurological: Patient is alert and oriented to person, place, and time.   Skin: Skin is warm and dry.   Psychiatric: Patient has a normal mood and affect.   Nursing note and vitals reviewed.       Ortho Exam   Left wrist with minimal tenderness slight loss of 20° volar flexion slight loss supination 20° otherwise and is grossly neurovascularly intact minimal tenderness over the surgical scar    Assessment/Plan   Review of Radiographic Studies:    No new imaging done today.      Tamiko Adams was seen today for follow-up.    Diagnoses and all orders for this visit:    Other closed extra-articular fracture of distal end of left radius with routine healing, subsequent encounter       Use brace as instructed      Recommendations/Plan:   Work/Activity Status: Gradual progression in lifting activities over the next several months    Patient agreeable to call or return sooner for any concerns.             Impression:  Healed left distal radial fracture status post ORIF volar plating  Plan:  Progression activities home exercise return when necessary  "

## 2018-09-24 ENCOUNTER — HOSPITAL ENCOUNTER (OUTPATIENT)
Dept: PHYSICAL THERAPY | Facility: HOSPITAL | Age: 73
Setting detail: THERAPIES SERIES
End: 2018-09-24
Payer: MEDICARE

## 2018-09-25 NOTE — DISCHARGE SUMMARY
Patient came to appt today, stated MD released him, declined treatment today, felt he could manage on his own at this time, D/C at this time.   Electronically signed by Zulema Bermeo PT on 9/25/2018 at 11:55 AM

## 2018-09-26 ENCOUNTER — APPOINTMENT (OUTPATIENT)
Dept: PHYSICAL THERAPY | Facility: HOSPITAL | Age: 73
End: 2018-09-26
Payer: MEDICARE

## 2018-11-07 ENCOUNTER — OFFICE VISIT (OUTPATIENT)
Dept: UROLOGY | Facility: CLINIC | Age: 73
End: 2018-11-07

## 2018-11-07 VITALS
HEART RATE: 61 BPM | BODY MASS INDEX: 35.78 KG/M2 | SYSTOLIC BLOOD PRESSURE: 128 MMHG | DIASTOLIC BLOOD PRESSURE: 80 MMHG | OXYGEN SATURATION: 98 % | WEIGHT: 270 LBS | HEIGHT: 73 IN

## 2018-11-07 DIAGNOSIS — N40.0 BENIGN PROSTATIC HYPERPLASIA, UNSPECIFIED WHETHER LOWER URINARY TRACT SYMPTOMS PRESENT: Primary | ICD-10-CM

## 2018-11-07 LAB
BILIRUB BLD-MCNC: NEGATIVE MG/DL
CLARITY, POC: CLEAR
COLOR UR: YELLOW
GLUCOSE UR STRIP-MCNC: NEGATIVE MG/DL
KETONES UR QL: NEGATIVE
LEUKOCYTE EST, POC: NEGATIVE
NITRITE UR-MCNC: NEGATIVE MG/ML
PH UR: 6 [PH] (ref 5–8)
PROT UR STRIP-MCNC: ABNORMAL MG/DL
PSA SERPL-MCNC: 3.48 NG/ML (ref 0.06–4)
RBC # UR STRIP: NEGATIVE /UL
SP GR UR: 1.02 (ref 1–1.03)
UROBILINOGEN UR QL: NORMAL

## 2018-11-07 PROCEDURE — 99213 OFFICE O/P EST LOW 20 MIN: CPT | Performed by: UROLOGY

## 2018-11-07 PROCEDURE — 81003 URINALYSIS AUTO W/O SCOPE: CPT | Performed by: UROLOGY

## 2018-11-07 NOTE — PROGRESS NOTES
Chief Complaint  Benign Prostatic Hypertrophy        PHIL Roman is a 73 y.o. male who returns today for an annual checkup generally doing well.  He had TURP some 10 years ago for bladder outlet obstruction and is been voiding well since.  He returns today with an AUA obstructive index of 10.  He has some other health issues and is known to have an aneurysm of his aorta at 5 cm that will require surgery if it progresses.    Vitals:    11/07/18 1355   BP: 128/80   Pulse: 61   SpO2: 98%       Past Medical History  Past Medical History:   Diagnosis Date   • Aneurysm (CMS/HCC)     aortic aneyrtysm ascending and transverse still has desending  watched by dr holloway at    • Diabetes mellitus (CMS/HCC)    • Elevated cholesterol    • GERD (gastroesophageal reflux disease)    • Gout    • Hyperlipidemia    • Hypertension    • Temporal arteritis (CMS/HCC)    • TIA (transient ischemic attack)     3 years ago   • Vocal cord paralysis     left during open heart surgery       Past Surgical History  Past Surgical History:   Procedure Laterality Date   • APPENDECTOMY     • ARTERIAL ANEURYSM REPAIR     • GALLBLADDER SURGERY     • HERNIA REPAIR  1987   • KNEE SURGERY Right    • ORIF WRIST FRACTURE Left 6/27/2018    Procedure: OPEN REDUCTION INTERNAL FIXATION LEFT WRIST;  Surgeon: Narinder Lewis MD;  Location: [facility];  Service: Orthopedics       Medications  has a current medication list which includes the following prescription(s): amlodipine, aspirin, atenolol, blood glucose monitoring suppl, glipizide, blood glucose test, hydrochlorothiazide, lancets, losartan, rosuvastatin, tramadol, and truetest test.      Allergies  Allergies   Allergen Reactions   • Lisinopril Unknown (See Comments)   • Norco [Hydrocodone-Acetaminophen] Itching   • Penicillins Unknown (See Comments)   • Ampicillin Rash   • Niacin And Related Rash       Social History  Social History     Social History Narrative   • No narrative on file       Family  History  He has no family history of bladder or kidney cancer  He has no family history of kidney stones      AUA Symptom Score:      Review of Systems  Review of Systems    Physical Exam  Physical Exam   Constitutional: He is oriented to person, place, and time. He appears well-developed and well-nourished.   HENT:   Head: Normocephalic and atraumatic.   Neck: Normal range of motion.   Pulmonary/Chest: Effort normal. No respiratory distress.   Abdominal: Soft. He exhibits no distension and no mass. There is no tenderness. No hernia.   Genitourinary: Rectum normal and prostate normal.   Musculoskeletal: Normal range of motion.   Lymphadenopathy:     He has no cervical adenopathy.   Neurological: He is alert and oriented to person, place, and time.   Skin: Skin is warm and dry.   Psychiatric: He has a normal mood and affect. His behavior is normal.   Vitals reviewed.      Labs Recent and today in the office:  Results for orders placed or performed in visit on 11/07/18   POC Urinalysis Dipstick, Automated   Result Value Ref Range    Color Yellow Yellow, Straw, Dark Yellow, Phyllis    Clarity, UA Clear Clear    Specific Gravity  1.025 1.005 - 1.030    pH, Urine 6.0 5.0 - 8.0    Leukocytes Negative Negative    Nitrite, UA Negative Negative    Protein, POC Trace (A) Negative mg/dL    Glucose, UA Negative Negative, 1000 mg/dL (3+) mg/dL    Ketones, UA Negative Negative    Urobilinogen, UA Normal Normal    Bilirubin Negative Negative    Blood, UA Negative Negative         Assessment & Plan  #1 BPH with outlet obstruction: Voiding fine after TURP currently on no medications.  SANDI is benign and a PSA is submitted.  If normal he can return on an annual basis.

## 2019-11-07 ENCOUNTER — OFFICE VISIT (OUTPATIENT)
Dept: UROLOGY | Facility: CLINIC | Age: 74
End: 2019-11-07

## 2019-11-07 VITALS
DIASTOLIC BLOOD PRESSURE: 85 MMHG | BODY MASS INDEX: 35.78 KG/M2 | WEIGHT: 270 LBS | SYSTOLIC BLOOD PRESSURE: 129 MMHG | OXYGEN SATURATION: 98 % | HEART RATE: 62 BPM | HEIGHT: 73 IN

## 2019-11-07 DIAGNOSIS — N40.1 BPH WITH URINARY OBSTRUCTION: Primary | ICD-10-CM

## 2019-11-07 DIAGNOSIS — N13.8 BPH WITH URINARY OBSTRUCTION: Primary | ICD-10-CM

## 2019-11-07 LAB
BILIRUB BLD-MCNC: NEGATIVE MG/DL
CLARITY, POC: CLEAR
COLOR UR: YELLOW
GLUCOSE UR STRIP-MCNC: NEGATIVE MG/DL
KETONES UR QL: NEGATIVE
LEUKOCYTE EST, POC: NEGATIVE
NITRITE UR-MCNC: NEGATIVE MG/ML
PH UR: 6 [PH] (ref 5–8)
PROT UR STRIP-MCNC: ABNORMAL MG/DL
RBC # UR STRIP: NEGATIVE /UL
SP GR UR: 1.02 (ref 1–1.03)
UROBILINOGEN UR QL: ABNORMAL

## 2019-11-07 PROCEDURE — 81003 URINALYSIS AUTO W/O SCOPE: CPT | Performed by: UROLOGY

## 2019-11-07 PROCEDURE — 99213 OFFICE O/P EST LOW 20 MIN: CPT | Performed by: UROLOGY

## 2019-11-07 RX ORDER — INFLUENZA VACCINE, ADJUVANTED 15; 15; 15 UG/.5ML; UG/.5ML; UG/.5ML
INJECTION, SUSPENSION INTRAMUSCULAR
Refills: 0 | COMMUNITY
Start: 2019-09-20 | End: 2020-11-10

## 2019-11-07 NOTE — PROGRESS NOTES
Chief Complaint  BPH with outlet obstruction        PHIL Roman is a 74 y.o. male who returns today for an annual checkup still voiding great 10 years after TURP.  He has no trouble with incontinence or infection and has clear urine today.  His biggest problem is his wife's been in the hospital for 26 days and he has stayed with her constantly.    Vitals:    11/07/19 1351   BP: 129/85   Pulse: 62   SpO2: 98%       Past Medical History  Past Medical History:   Diagnosis Date   • Aneurysm (CMS/HCC)     aortic aneyrtysm ascending and transverse still has desending  watched by dr holloway at    • Diabetes mellitus (CMS/HCC)    • Elevated cholesterol    • GERD (gastroesophageal reflux disease)    • Gout    • Hyperlipidemia    • Hypertension    • Temporal arteritis (CMS/HCC)    • TIA (transient ischemic attack)     3 years ago   • Vocal cord paralysis     left during open heart surgery       Past Surgical History  Past Surgical History:   Procedure Laterality Date   • APPENDECTOMY     • ARTERIAL ANEURYSM REPAIR     • GALLBLADDER SURGERY     • HERNIA REPAIR  1987   • KNEE SURGERY Right    • ORIF WRIST FRACTURE Left 6/27/2018    Procedure: OPEN REDUCTION INTERNAL FIXATION LEFT WRIST;  Surgeon: Narinder Lewis MD;  Location: [facility];  Service: Orthopedics       Medications  has a current medication list which includes the following prescription(s): amlodipine, aspirin, atenolol, blood glucose monitoring suppl, fluad, glipizide, blood glucose test, hydrochlorothiazide, lancets, losartan, tramadol, truetest test, and rosuvastatin.      Allergies  Allergies   Allergen Reactions   • Lisinopril Unknown (See Comments)   • Norco [Hydrocodone-Acetaminophen] Itching   • Penicillins Unknown (See Comments)   • Ampicillin Rash   • Niacin And Related Rash       Social History  Social History     Socioeconomic History   • Marital status:      Spouse name: Not on file   • Number of children: Not on file   • Years of education: Not  on file   • Highest education level: Not on file   Tobacco Use   • Smoking status: Former Smoker     Last attempt to quit: 1967     Years since quittin.8   • Smokeless tobacco: Never Used   Substance and Sexual Activity   • Alcohol use: No   • Drug use: No   • Sexual activity: Defer       Family History  He has no family history of bladder or kidney cancer  He has no family history of kidney stones      AUA Symptom Score:      Review of Systems  Review of Systems   Constitutional: Negative for activity change, appetite change, chills, fatigue, fever, unexpected weight gain and unexpected weight loss.   Respiratory: Negative for apnea, cough, chest tightness, shortness of breath, wheezing and stridor.    Cardiovascular: Negative for chest pain, palpitations and leg swelling.   Gastrointestinal: Negative for abdominal distention, abdominal pain, anal bleeding, blood in stool, constipation, diarrhea, nausea, rectal pain, vomiting, GERD and indigestion.   Genitourinary: Negative for decreased libido, decreased urine volume, difficulty urinating, discharge, dysuria, flank pain, frequency, genital sores, hematuria, nocturia, penile pain, erectile dysfunction, penile swelling, scrotal swelling, testicular pain, urgency and urinary incontinence.   Musculoskeletal: Negative for back pain and joint swelling.   Neurological: Negative for tremors, seizures, speech difficulty, weakness and numbness.   Psychiatric/Behavioral: Negative for agitation, decreased concentration, sleep disturbance, depressed mood and stress. The patient is not nervous/anxious.        Physical Exam  Physical Exam   Constitutional: He is oriented to person, place, and time. He appears well-developed and well-nourished.   HENT:   Head: Normocephalic and atraumatic.   Neck: Normal range of motion.   Pulmonary/Chest: Effort normal. No respiratory distress.   Abdominal: Soft. He exhibits no distension and no mass. There is no tenderness. No hernia.    Genitourinary: Rectum normal and prostate normal.   Musculoskeletal: Normal range of motion.   Lymphadenopathy:     He has no cervical adenopathy.   Neurological: He is alert and oriented to person, place, and time.   Skin: Skin is warm and dry.   Psychiatric: He has a normal mood and affect. His behavior is normal.   Vitals reviewed.      Labs Recent and today in the office:  Results for orders placed or performed in visit on 11/07/19   POC Urinalysis Dipstick, Automated   Result Value Ref Range    Color Yellow Yellow, Straw, Dark Yellow, Phyllis    Clarity, UA Clear Clear    Specific Gravity  1.025 1.005 - 1.030    pH, Urine 6.0 5.0 - 8.0    Leukocytes Negative Negative    Nitrite, UA Negative Negative    Protein, POC 1+ (A) Negative mg/dL    Glucose, UA Negative Negative, 1000 mg/dL (3+) mg/dL    Ketones, UA Negative Negative    Urobilinogen, UA 1 E.U./dL  (A) Normal    Bilirubin Negative Negative    Blood, UA Negative Negative         Assessment & Plan  He is currently voiding without difficulty off all medication and has clear urine.  Digital rectal exam is benign so a PSA is obtained and if normal he can return on an annual basis.

## 2019-11-08 LAB — PSA SERPL-MCNC: 3.8 NG/ML (ref 0–4)

## 2019-11-20 ENCOUNTER — HOSPITAL ENCOUNTER (OUTPATIENT)
Facility: HOSPITAL | Age: 74
Discharge: HOME | End: 2019-11-20
Payer: MEDICARE

## 2019-11-20 PROCEDURE — 36415 COLL VENOUS BLD VENIPUNCTURE: CPT

## 2019-11-21 ENCOUNTER — HOSPITAL ENCOUNTER (OUTPATIENT)
Facility: HOSPITAL | Age: 74
Discharge: HOME | End: 2019-11-21
Payer: MEDICARE

## 2019-11-21 PROCEDURE — 36415 COLL VENOUS BLD VENIPUNCTURE: CPT

## 2019-12-09 DIAGNOSIS — M25.562 ARTHRALGIA OF KNEE, LEFT: Primary | ICD-10-CM

## 2019-12-10 ENCOUNTER — OFFICE VISIT (OUTPATIENT)
Dept: ORTHOPEDIC SURGERY | Facility: CLINIC | Age: 74
End: 2019-12-10

## 2019-12-10 VITALS — WEIGHT: 270 LBS | HEIGHT: 73 IN | RESPIRATION RATE: 18 BRPM | BODY MASS INDEX: 35.78 KG/M2

## 2019-12-10 DIAGNOSIS — M17.10 ARTHRITIS OF KNEE: Primary | ICD-10-CM

## 2019-12-10 PROCEDURE — 20610 DRAIN/INJ JOINT/BURSA W/O US: CPT | Performed by: ORTHOPAEDIC SURGERY

## 2019-12-10 PROCEDURE — 99214 OFFICE O/P EST MOD 30 MIN: CPT | Performed by: ORTHOPAEDIC SURGERY

## 2019-12-10 RX ORDER — TRIAMCINOLONE ACETONIDE 40 MG/ML
40 INJECTION, SUSPENSION INTRA-ARTICULAR; INTRAMUSCULAR
Status: COMPLETED | OUTPATIENT
Start: 2019-12-10 | End: 2019-12-10

## 2019-12-10 RX ORDER — LIDOCAINE HYDROCHLORIDE 10 MG/ML
2 INJECTION, SOLUTION INFILTRATION; PERINEURAL
Status: COMPLETED | OUTPATIENT
Start: 2019-12-10 | End: 2019-12-10

## 2019-12-10 RX ADMIN — TRIAMCINOLONE ACETONIDE 40 MG: 40 INJECTION, SUSPENSION INTRA-ARTICULAR; INTRAMUSCULAR at 13:49

## 2019-12-10 RX ADMIN — LIDOCAINE HYDROCHLORIDE 2 ML: 10 INJECTION, SOLUTION INFILTRATION; PERINEURAL at 13:49

## 2019-12-10 NOTE — PROGRESS NOTES
Subjective   Patient ID: Bryan Roman is a 74 y.o. male  Pain of the Right Knee (Patient is here today for right knee pain, he states a burning pain started about 3 months ago, with no injury or trauma.)             History of Present Illness  74-year-old male with medial left knee pain does not recall specific fall or injury just aggravating in the last 2 or 3 months, his wife is been in and out of hospitals for and 5 times with prolonged stays he attributes some of the pain to prolonged walking activities.  Denies hip pain back pain paresthesias, most the pains on the medial side of the knee feels stiff tight and swollen especially lays down trying to straighten it out.  Has taken extra strength Tylenol with no improvement no recent injections no prior surgery.  Does have history of right knee open meniscectomy surgery in the 1960s.      Review of Systems   Constitutional: Negative for fever.   HENT: Negative for dental problem and voice change.    Eyes: Negative for visual disturbance.   Respiratory: Negative for shortness of breath.    Cardiovascular: Negative for chest pain.   Gastrointestinal: Negative for abdominal pain.   Genitourinary: Negative for dysuria.   Musculoskeletal: Positive for arthralgias. Negative for gait problem and joint swelling.   Skin: Negative for rash.   Neurological: Negative for speech difficulty.   Hematological: Does not bruise/bleed easily.   Psychiatric/Behavioral: Negative for confusion.       Past Medical History:   Diagnosis Date   • Aneurysm (CMS/HCC)     aortic aneyrtysm ascending and transverse still has desending  watched by dr holloway at    • Diabetes mellitus (CMS/HCC)    • Elevated cholesterol    • GERD (gastroesophageal reflux disease)    • Gout    • Hyperlipidemia    • Hypertension    • Temporal arteritis (CMS/HCC)    • TIA (transient ischemic attack)     3 years ago   • Vocal cord paralysis     left during open heart surgery        Past Surgical History:    Procedure Laterality Date   • APPENDECTOMY     • ARTERIAL ANEURYSM REPAIR     • GALLBLADDER SURGERY     • HERNIA REPAIR     • KNEE SURGERY Right    • ORIF WRIST FRACTURE Left 2018    Procedure: OPEN REDUCTION INTERNAL FIXATION LEFT WRIST;  Surgeon: Narinder Lewis MD;  Location: [facility];  Service: Orthopedics       Family History   Problem Relation Age of Onset   • Diabetes Maternal Grandmother        Social History     Socioeconomic History   • Marital status:      Spouse name: Not on file   • Number of children: Not on file   • Years of education: Not on file   • Highest education level: Not on file   Tobacco Use   • Smoking status: Former Smoker     Last attempt to quit: 1967     Years since quittin.8   • Smokeless tobacco: Never Used   Substance and Sexual Activity   • Alcohol use: No   • Drug use: No   • Sexual activity: Defer       I have reviewed the above medical and surgical history, family history, social history, medications, allergies and review of systems.    Allergies   Allergen Reactions   • Lisinopril Unknown (See Comments)   • Norco [Hydrocodone-Acetaminophen] Itching   • Penicillins Unknown (See Comments)   • Ampicillin Rash   • Niacin And Related Rash         Current Outpatient Medications:   •  amLODIPine (NORVASC) 10 MG tablet, Take 10 mg by mouth Daily., Disp: , Rfl:   •  aspirin 81 MG EC tablet, Take 81 mg by mouth Daily., Disp: , Rfl:   •  atenolol (TENORMIN) 100 MG tablet, Take 200 mg by mouth daily., Disp: , Rfl:   •  Blood Glucose Monitoring Suppl device, , Disp: , Rfl:   •  FLUAD 0.5 ML suspension prefilled syringe, NOW, Disp: , Rfl: 0  •  glipiZIDE (GLUCOTROL) 5 MG ER tablet, Take 5 mg by mouth 2 (Two) Times a Day., Disp: , Rfl:   •  Glucose Blood (BLOOD GLUCOSE TEST) strip, , Disp: , Rfl:   •  hydrochlorothiazide (HYDRODIURIL) 25 MG tablet, Take 25 mg by mouth daily., Disp: , Rfl:   •  Lancets misc, , Disp: , Rfl:   •  losartan (COZAAR) 100 MG tablet, Take  "100 mg by mouth., Disp: , Rfl:   •  rosuvastatin (CRESTOR) 20 MG tablet, , Disp: , Rfl: 1  •  traMADol (ULTRAM) 50 MG tablet, Take 1 tablet by mouth Every 6 (Six) Hours As Needed for Moderate Pain ., Disp: 30 tablet, Rfl: 0  •  TRUETEST TEST test strip, 1 each by Other route as needed., Disp: , Rfl:     Objective   Resp 18   Ht 185.4 cm (73\")   Wt 122 kg (270 lb)   BMI 35.62 kg/m²    Physical Exam  Constitutional: Patient is oriented to person, place, and time. Patient appears well-developed and well-nourished.   HENT:Head: Normocephalic and atraumatic.   Eyes: EOM are normal. Pupils are equal, round, and reactive to light.   Neck: Normal range of motion. Neck supple.   Cardiovascular: Normal rate.    Pulmonary/Chest: Effort normal and breath sounds normal.   Abdominal: Soft.   Neurological: Patient is alert and oriented to person, place, and time.   Skin: Skin is warm and dry.   Psychiatric: Patient has a normal mood and affect.   Nursing note and vitals reviewed.       [unfilled]   Left knee: Slight varus alignment tenderness over the posterior medial and anteromedial joint line no instability with varus valgus stress Lockman negative lateral joint line nontender extensor mechanism intact and nonpainful    Assessment/Plan   Review of Radiographic Studies:    Indication to evaluate joint condition, no comparison views available, shows evident chronic advanced osteoarthritis.      Large Joint Arthrocentesis: L knee  Date/Time: 12/10/2019 1:49 PM  Consent given by: patient  Site marked: site marked  Timeout: Immediately prior to procedure a time out was called to verify the correct patient, procedure, equipment, support staff and site/side marked as required   Supporting Documentation  Indications: pain   Procedure Details  Location: knee - L knee  Preparation: Patient was prepped and draped in the usual sterile fashion  Needle size: 22 G  Approach: anterolateral  Medications administered: 40 mg triamcinolone " acetonide 40 MG/ML; 2 mL lidocaine 1 %  Patient tolerance: patient tolerated the procedure well with no immediate complications           Byran was seen today for pain.    Diagnoses and all orders for this visit:    Arthritis of knee       Orthopedic activities reviewed and patient expressed appreciation, Risk, benefits, and merits of treatment alternatives reviewed with the patient and questions answered and Using illustrations and models, the nature of the pathology was explained to the patient      Recommendations/Plan:   Exercise, medications, injections, other patient advice, and return appointment as noted.    Patient agreeable to call or return sooner for any concerns.         I reviewed the patient's radiographs indicating advanced osteoarthritis discussed the natural history treatment options and pros and cons and risks and complications of surgical and nonsurgical care.  I also reviewed advantages and disadvantages risks and complications of steroid injections versus viscus gel injections.  The patient had opportunity to ask questions which were answered.    Impression:  Right greater than left symptomatic knee arthritis  Plan:  If not improved in 1 week he will call we will schedule an MRI of his left knee then see  him here after his MRI complete,-- if he obtains complete relief return in 3 to 4 months

## 2020-02-13 ENCOUNTER — TELEPHONE (OUTPATIENT)
Dept: ORTHOPEDIC SURGERY | Facility: CLINIC | Age: 75
End: 2020-02-13

## 2020-02-13 NOTE — TELEPHONE ENCOUNTER
Patient called stating the injection he got in December lasted about 3 weeks. He is having pain and difficulty sleeping at night. He wanted to know if there was something that can be done besides MRI or there is any pain medication he can take.     LM for pt to return call.

## 2020-03-11 NOTE — PROGRESS NOTES
Received request via: Patient    Was the patient seen in the last year in this department? Yes    Does the patient have an active prescription (recently filled or refills available) for medication(s) requested? No        Subjective   Patient ID: Bryan Roman is a 73 y.o. male  Post-op of the Left Wrist (Patient is here today for suture removal and cast application, he denies any pain.)             History of Present Illness  Routine scheduled follow-up for removal sutures application short arm cast, no new complaints      Review of Systems   Constitutional: Negative for fever.   HENT: Negative for voice change.    Eyes: Negative for visual disturbance.   Respiratory: Negative for shortness of breath.    Cardiovascular: Negative for chest pain.   Gastrointestinal: Negative for abdominal distention and abdominal pain.   Genitourinary: Negative for dysuria.   Musculoskeletal: Negative for arthralgias, gait problem and joint swelling.   Skin: Negative for rash.   Neurological: Negative for speech difficulty.   Hematological: Does not bruise/bleed easily.   Psychiatric/Behavioral: Negative for confusion.       Past Medical History:   Diagnosis Date   • Aneurysm (CMS/HCC)     aortic aneyrtysm ascending and transverse still has desending  watched by dr holloway at    • Diabetes mellitus (CMS/HCC)    • Elevated cholesterol    • GERD (gastroesophageal reflux disease)    • Gout    • Hyperlipidemia    • Hypertension    • Temporal arteritis (CMS/HCC)    • TIA (transient ischemic attack)     3 years ago   • Vocal cord paralysis     left during open heart surgery        Past Surgical History:   Procedure Laterality Date   • APPENDECTOMY     • ARTERIAL ANEURYSM REPAIR     • GALLBLADDER SURGERY     • HERNIA REPAIR  1987   • KNEE SURGERY Right    • ORIF WRIST FRACTURE Left 6/27/2018    Procedure: OPEN REDUCTION INTERNAL FIXATION LEFT WRIST;  Surgeon: Narinder Lewis MD;  Location: [facility];  Service: Orthopedics       Family History   Problem Relation Age of Onset   • Diabetes Maternal Grandmother        Social History     Social History   • Marital status:      Spouse name: N/A   • Number of children: N/A   • Years of education: N/A      Occupational History   • Not on file.     Social History Main Topics   • Smoking status: Former Smoker     Quit date: 2/1/1967   • Smokeless tobacco: Never Used   • Alcohol use No   • Drug use: No   • Sexual activity: Defer     Other Topics Concern   • Not on file     Social History Narrative   • No narrative on file       I have reviewed all of the above social hx, family hx, surgical hx, medications, allergies & ROS and confirm that it is accurate.    Allergies   Allergen Reactions   • Norco [Hydrocodone-Acetaminophen] Itching   • Ampicillin Rash   • Niacin And Related Rash         Current Outpatient Prescriptions:   •  amLODIPine (NORVASC) 10 MG tablet, Take 10 mg by mouth Daily., Disp: , Rfl:   •  aspirin 81 MG EC tablet, Take 81 mg by mouth Daily., Disp: , Rfl:   •  atenolol (TENORMIN) 100 MG tablet, Take 200 mg by mouth daily., Disp: , Rfl:   •  Blood Glucose Monitoring Suppl device, , Disp: , Rfl:   •  glipiZIDE (GLUCOTROL) 5 MG ER tablet, Take 5 mg by mouth 2 (Two) Times a Day., Disp: , Rfl:   •  Glucose Blood (BLOOD GLUCOSE TEST) strip, , Disp: , Rfl:   •  hydrochlorothiazide (HYDRODIURIL) 25 MG tablet, Take 25 mg by mouth daily., Disp: , Rfl:   •  Lancets misc, , Disp: , Rfl:   •  lisinopril (PRINIVIL,ZESTRIL) 2.5 MG tablet, Take 2.5 mg by mouth daily., Disp: , Rfl:   •  losartan (COZAAR) 100 MG tablet, Take 100 mg by mouth., Disp: , Rfl:   •  mupirocin (BACTROBAN NASAL) 2 % nasal ointment, into each nostril Daily., Disp: 1 g, Rfl: 0  •  oxyCODONE-acetaminophen (PERCOCET) 5-325 MG per tablet, Take 1-2 tablets by mouth Every 6 (Six) Hours As Needed for pain, Disp: 25 tablet, Rfl: 0  •  raNITIdine (ZANTAC) 150 MG tablet, Take 150 mg by mouth Every Night., Disp: , Rfl:   •  traMADol (ULTRAM) 50 MG tablet, Take 1 tablet by mouth Every 6 (Six) Hours As Needed for Moderate Pain ., Disp: 30 tablet, Rfl: 0  •  TRUETEST TEST test strip, 1 each by Other route as needed., Disp: , Rfl:     Objective   Resp 18    "Ht 185.4 cm (73\")   Wt 119 kg (262 lb)   BMI 34.57 kg/m²    Physical Exam  Constitutional: Patient is oriented to person, place, and time. Patient appears well-developed and well-nourished.   HENT:Head: Normocephalic and atraumatic.   Eyes: EOM are normal. Pupils are equal, round, and reactive to light.   Neck: Normal range of motion. Neck supple.   Cardiovascular: Normal rate.    Pulmonary/Chest: Effort normal and breath sounds normal.   Abdominal: Soft.   Neurological: Patient is alert and oriented to person, place, and time.   Skin: Skin is warm and dry.   Psychiatric: Patient has a normal mood and affect.   Nursing note and vitals reviewed.       Ortho Exam   Left wrist incision well approximated sutures removed Steri-Strips applied new short arm fiberglass cast applied    Assessment/Plan   Review of Radiographic Studies:    Radiographic images today of fracture I personally viewed and confirm satisfactory alignment.      Left short arm fiberglass cast application  Date/Time: 7/10/2018 10:38 AM  Performed by: CARLTON GELLER  Authorized by: CARLTON GELLER   Consent: Verbal consent obtained.  Risks and benefits: risks, benefits and alternatives were discussed  Consent given by: patient  Patient identity confirmed: verbally with patient  Location details: left arm  Splint type: volar short arm  Supplies used: Ortho-Glass  Post-procedure: The splinted body part was neurovascularly unchanged following the procedure.  Patient tolerance: Patient tolerated the procedure well with no immediate complications           Bryan was seen today for post-op.    Diagnoses and all orders for this visit:    Other closed extra-articular fracture of distal end of left radius with routine healing, subsequent encounter    Surgical followup    Other orders  -     Splint Application       Orthopedic activities reviewed and patient expressed appreciation      Recommendations/Plan:   Work/Activity Status: No use of involved " extremity    Patient agreeable to call or return sooner for any concerns.             Impression:  Status post left distal radial ORIF  volar plating  Plan:  Return in 4-6 weeks for cast removal x-rays left wrist out of cast and refer to therapy at that time

## 2020-07-10 ENCOUNTER — CONSULT (OUTPATIENT)
Dept: CARDIOLOGY | Facility: CLINIC | Age: 75
End: 2020-07-10

## 2020-07-10 VITALS
BODY MASS INDEX: 36.18 KG/M2 | WEIGHT: 273 LBS | OXYGEN SATURATION: 98 % | SYSTOLIC BLOOD PRESSURE: 130 MMHG | HEIGHT: 73 IN | DIASTOLIC BLOOD PRESSURE: 78 MMHG | HEART RATE: 68 BPM

## 2020-07-10 DIAGNOSIS — Z79.4 TYPE 2 DIABETES MELLITUS WITHOUT COMPLICATION, WITH LONG-TERM CURRENT USE OF INSULIN (HCC): ICD-10-CM

## 2020-07-10 DIAGNOSIS — E11.9 TYPE 2 DIABETES MELLITUS WITHOUT COMPLICATION, WITH LONG-TERM CURRENT USE OF INSULIN (HCC): ICD-10-CM

## 2020-07-10 DIAGNOSIS — I71.40 AAA (ABDOMINAL AORTIC ANEURYSM) WITHOUT RUPTURE (HCC): ICD-10-CM

## 2020-07-10 DIAGNOSIS — E78.5 HYPERLIPIDEMIA LDL GOAL <100: ICD-10-CM

## 2020-07-10 DIAGNOSIS — I10 ESSENTIAL HYPERTENSION: Primary | ICD-10-CM

## 2020-07-10 PROCEDURE — 93000 ELECTROCARDIOGRAM COMPLETE: CPT | Performed by: INTERNAL MEDICINE

## 2020-07-10 PROCEDURE — 99204 OFFICE O/P NEW MOD 45 MIN: CPT | Performed by: INTERNAL MEDICINE

## 2020-07-10 RX ORDER — INDOMETHACIN 50 MG/1
CAPSULE ORAL
COMMUNITY
Start: 2020-06-29 | End: 2021-01-07 | Stop reason: SDUPTHER

## 2020-07-10 NOTE — PROGRESS NOTES
[facility] OP Consult Note    Bryan Roman  [id number]  07/10/2020    Referred By: Self-referral    Chief Complaint: Reestablish cardiac care    History of Present Illness:   Mr. Bryan Roman is a 75 y.o. male who presents to the Cardiology Clinic to reestablish cardiac care.  The patient has a past medical history significant for type 2 diabetes mellitus, hypercholesterolemia, GERD, gout, and obesity.  He has a past vascular history significant for ascending aortic aneurysm status post repair in 9/15.  He is also known to have an abdominal aortic aneurysm, which is followed by vascular surgery.  He did see follow-up with his primary cardiologist, who has retired.  He now presents to the cardiology clinic to reestablish cardiac care.  Currently, the patient denies any chest pain or chest discomfort.  No significant exertional dyspnea.  No orthopnea, PND, or significant lower extremity edema.  No palpitations.  No presyncopal or syncopal events.  He reports he is following with  vascular surgery regarding his abdominal aortic aneurysm.  No other specific complaints at this time.      Review of Systems:   Review of Systems   Constitutional: Negative for activity change, appetite change, chills, diaphoresis, fatigue, fever, unexpected weight gain and unexpected weight loss.   Eyes: Negative for blurred vision and double vision.   Respiratory: Negative for cough, chest tightness, shortness of breath and wheezing.    Cardiovascular: Negative for chest pain, palpitations and leg swelling.   Gastrointestinal: Negative for abdominal pain, anal bleeding, blood in stool and GERD.   Endocrine: Negative for cold intolerance and heat intolerance.   Genitourinary: Negative for hematuria.   Neurological: Negative for dizziness, syncope, weakness and light-headedness.   Hematological: Does not bruise/bleed easily.   Psychiatric/Behavioral: Negative for depressed mood and stress. The patient is  not nervous/anxious.        Past Medical History:   Past Medical History:   Diagnosis Date   • Aneurysm (CMS/HCC)     aortic aneyrtysm ascending and transverse still has desending  watched by dr holloway at    • Arthritis    • Diabetes mellitus (CMS/HCC)    • Elevated cholesterol    • GERD (gastroesophageal reflux disease)    • Gout    • Heart disease    • Hyperlipidemia    • Hypertension    • Temporal arteritis (CMS/HCC)    • TIA (transient ischemic attack)     3 years ago   • Vocal cord paralysis     left during open heart surgery       Past Surgical History:   Past Surgical History:   Procedure Laterality Date   • APPENDECTOMY     • ARTERIAL ANEURYSM REPAIR     • GALLBLADDER SURGERY     • HERNIA REPAIR     • KNEE SURGERY Right    • ORIF WRIST FRACTURE Left 2018    Procedure: OPEN REDUCTION INTERNAL FIXATION LEFT WRIST;  Surgeon: Narinder Lewis MD;  Location: [facility];  Service: Orthopedics   • THROAT SURGERY  2015       Family History:   Family History   Problem Relation Age of Onset   • Diabetes Maternal Grandmother    • Hypertension Other        Social History:   Social History     Socioeconomic History   • Marital status:      Spouse name: Not on file   • Number of children: Not on file   • Years of education: Not on file   • Highest education level: Not on file   Tobacco Use   • Smoking status: Former Smoker     Last attempt to quit: 1967     Years since quittin.4   • Smokeless tobacco: Never Used   Substance and Sexual Activity   • Alcohol use: No   • Drug use: No   • Sexual activity: Defer       Medications:     Current Outpatient Medications:   •  amLODIPine (NORVASC) 10 MG tablet, Take 10 mg by mouth Daily., Disp: , Rfl:   •  aspirin 81 MG EC tablet, Take 81 mg by mouth Daily., Disp: , Rfl:   •  atenolol (TENORMIN) 100 MG tablet, Take 200 mg by mouth daily., Disp: , Rfl:   •  Blood Glucose Monitoring Suppl device, , Disp: , Rfl:   •  FLUAD 0.5 ML suspension prefilled  "syringe, NOW, Disp: , Rfl: 0  •  glipiZIDE (GLUCOTROL) 5 MG ER tablet, Take 5 mg by mouth 2 (Two) Times a Day., Disp: , Rfl:   •  Glucose Blood (BLOOD GLUCOSE TEST) strip, , Disp: , Rfl:   •  hydrochlorothiazide (HYDRODIURIL) 25 MG tablet, Take 25 mg by mouth daily., Disp: , Rfl:   •  indomethacin (INDOCIN) 50 MG capsule, , Disp: , Rfl:   •  Lancets misc, , Disp: , Rfl:   •  losartan (COZAAR) 100 MG tablet, Take 100 mg by mouth., Disp: , Rfl:   •  TRUETEST TEST test strip, 1 each by Other route as needed., Disp: , Rfl:   •  rosuvastatin (CRESTOR) 20 MG tablet, , Disp: , Rfl: 1  •  traMADol (ULTRAM) 50 MG tablet, Take 1 tablet by mouth Every 6 (Six) Hours As Needed for Moderate Pain ., Disp: 30 tablet, Rfl: 0    Allergies:   Allergies   Allergen Reactions   • Lisinopril Unknown (See Comments)   • Norco [Hydrocodone-Acetaminophen] Itching   • Penicillins Unknown (See Comments)   • Ampicillin Rash   • Niacin And Related Rash       Physical Exam:  Vital Signs:   Vitals:    07/10/20 1126 07/10/20 1136   BP: 130/70 130/78   BP Location: Right arm Left arm   Patient Position: Sitting Sitting   Cuff Size: Adult Adult   Pulse: 68    SpO2: 98%    Weight: 124 kg (273 lb)    Height: 185.4 cm (73\")        Physical Exam   Constitutional: He is oriented to person, place, and time. He appears well-developed and well-nourished. No distress.   HENT:   Head: Normocephalic and atraumatic.   Moist Mucous Membranes.    Eyes: Pupils are equal, round, and reactive to light. EOM are normal. No scleral icterus.   Neck: No tracheal deviation present.   Cardiovascular: Normal rate, regular rhythm, normal heart sounds and intact distal pulses. Exam reveals no gallop and no friction rub.   No murmur heard.  Normal JVD.   Pulmonary/Chest: Effort normal and breath sounds normal. No stridor. No respiratory distress. He has no wheezes. He has no rales. He exhibits no tenderness.   Abdominal: Soft. Bowel sounds are normal. He exhibits no distension. " There is no tenderness. There is no rebound and no guarding.   Musculoskeletal: Normal range of motion. He exhibits no edema.   Lymphadenopathy:     He has no cervical adenopathy.   Neurological: He is alert and oriented to person, place, and time.   Skin: Skin is warm and dry. He is not diaphoretic. No erythema.   Psychiatric: He has a normal mood and affect. His behavior is normal.       Results Review:   I reviewed the patient's new clinical results.      ECG 12 Lead  Date/Time: 8/30/2020 10:46 AM  Performed by: Imer Guidry MD  Authorized by: Imer Guidry MD   Comparison: not compared with previous ECG   Rhythm: sinus rhythm  Rate: normal  QRS axis: normal    Clinical impression: normal ECG            Assessment / Plan:     1.  Essential hypertension  --Currently well controlled  --Continue atenolol, Norvasc, HCTZ, and losartan    2.  Hyperlipidemia LDL goal <100  --Continue high intensity statin    3.  AAA (abdominal aortic aneurysm)   --Follows regularly with [facility] vascular surgery    4.  Type 2 diabetes mellitus   --Management per PCP        Follow Up:   Return in about 9 months (around 4/10/2021).      Thank you for allowing me to participate in the care of your patient. Please to not hesitate to contact me with additional questions or concerns.     JAMIE Guidry MD  Interventional Cardiology   07/10/2020  11:12 AM

## 2020-07-13 PROBLEM — E11.9 TYPE 2 DIABETES MELLITUS WITHOUT COMPLICATION, WITH LONG-TERM CURRENT USE OF INSULIN (HCC): Status: ACTIVE | Noted: 2020-07-13

## 2020-07-13 PROBLEM — Z79.4 TYPE 2 DIABETES MELLITUS WITHOUT COMPLICATION, WITH LONG-TERM CURRENT USE OF INSULIN (HCC): Status: ACTIVE | Noted: 2020-07-13

## 2020-07-13 PROBLEM — I71.40 AAA (ABDOMINAL AORTIC ANEURYSM) WITHOUT RUPTURE: Status: ACTIVE | Noted: 2020-07-13

## 2020-07-13 PROBLEM — I10 ESSENTIAL HYPERTENSION: Status: ACTIVE | Noted: 2020-07-13

## 2020-07-13 PROBLEM — E78.5 HYPERLIPIDEMIA LDL GOAL <100: Status: ACTIVE | Noted: 2020-07-13

## 2020-08-10 ENCOUNTER — OFFICE VISIT (OUTPATIENT)
Dept: INTERNAL MEDICINE | Facility: CLINIC | Age: 75
End: 2020-08-10

## 2020-08-10 ENCOUNTER — HOSPITAL ENCOUNTER (OUTPATIENT)
Dept: GENERAL RADIOLOGY | Facility: HOSPITAL | Age: 75
Discharge: HOME | End: 2020-08-10
Admitting: FAMILY MEDICINE

## 2020-08-10 VITALS
TEMPERATURE: 97.8 F | WEIGHT: 282.13 LBS | BODY MASS INDEX: 37.39 KG/M2 | HEART RATE: 68 BPM | DIASTOLIC BLOOD PRESSURE: 75 MMHG | SYSTOLIC BLOOD PRESSURE: 135 MMHG | RESPIRATION RATE: 18 BRPM | OXYGEN SATURATION: 97 % | HEIGHT: 73 IN

## 2020-08-10 DIAGNOSIS — R93.89 ABNORMAL X-RAY EXAMINATION: ICD-10-CM

## 2020-08-10 DIAGNOSIS — I71.21 ASCENDING AORTIC ANEURYSM (HCC): ICD-10-CM

## 2020-08-10 DIAGNOSIS — Z86.73 HISTORY OF TIA (TRANSIENT ISCHEMIC ATTACK): ICD-10-CM

## 2020-08-10 DIAGNOSIS — I10 ESSENTIAL HYPERTENSION: ICD-10-CM

## 2020-08-10 DIAGNOSIS — Z78.9 STATIN INTOLERANCE: ICD-10-CM

## 2020-08-10 DIAGNOSIS — I71.20 THORACIC AORTIC ANEURYSM WITHOUT RUPTURE (HCC): ICD-10-CM

## 2020-08-10 DIAGNOSIS — M16.11 PRIMARY OSTEOARTHRITIS OF RIGHT HIP: ICD-10-CM

## 2020-08-10 DIAGNOSIS — Z79.4 TYPE 2 DIABETES MELLITUS WITH OTHER CIRCULATORY COMPLICATION, WITH LONG-TERM CURRENT USE OF INSULIN (HCC): Primary | ICD-10-CM

## 2020-08-10 DIAGNOSIS — M25.551 RIGHT HIP PAIN: ICD-10-CM

## 2020-08-10 DIAGNOSIS — E11.59 TYPE 2 DIABETES MELLITUS WITH OTHER CIRCULATORY COMPLICATION, WITH LONG-TERM CURRENT USE OF INSULIN (HCC): Primary | ICD-10-CM

## 2020-08-10 DIAGNOSIS — E78.5 HYPERLIPIDEMIA LDL GOAL <100: ICD-10-CM

## 2020-08-10 PROBLEM — Z01.818 PREOP EXAMINATION: Status: RESOLVED | Noted: 2018-06-25 | Resolved: 2020-08-10

## 2020-08-10 LAB — HBA1C MFR BLD: 5.8 %

## 2020-08-10 PROCEDURE — 73502 X-RAY EXAM HIP UNI 2-3 VIEWS: CPT

## 2020-08-10 PROCEDURE — 99204 OFFICE O/P NEW MOD 45 MIN: CPT | Performed by: FAMILY MEDICINE

## 2020-08-10 PROCEDURE — 83036 HEMOGLOBIN GLYCOSYLATED A1C: CPT | Performed by: FAMILY MEDICINE

## 2020-08-10 RX ORDER — GLIPIZIDE 5 MG/1
5 TABLET, FILM COATED, EXTENDED RELEASE ORAL DAILY
Qty: 90 TABLET | Refills: 3 | Status: SHIPPED | OUTPATIENT
Start: 2020-08-10 | End: 2021-05-03

## 2020-08-10 RX ORDER — AMPICILLIN TRIHYDRATE 250 MG
CAPSULE ORAL
COMMUNITY

## 2020-08-10 RX ORDER — GABAPENTIN 300 MG/1
300 CAPSULE ORAL NIGHTLY PRN
Qty: 90 CAPSULE | Refills: 1 | Status: SHIPPED | OUTPATIENT
Start: 2020-08-10 | End: 2020-11-10 | Stop reason: SDUPTHER

## 2020-08-10 NOTE — ASSESSMENT & PLAN NOTE
Hypertension is improving with treatment.  Continue current treatment regimen.  Ambulatory blood pressure monitoring.  Goal blood pressure is under 130/80  Blood pressure will be reassessed at the next regular appointment.

## 2020-08-10 NOTE — PROGRESS NOTES
Severe arthritis noted in hip, with possible weakness of the femoral head, possibly increased risk of fracture. Given advanced nature of this I highly suggest seeing orthopedics. If agreeable, let me know and I will refer. Findings do not require med changes from what we discussed today.

## 2020-08-10 NOTE — PROGRESS NOTES
Subjective    Bryan Roman is a 75 y.o. male here for:  Chief Complaint   Patient presents with   • Diabetes     Pt states he has not had his A1C checked in about 4 months.    • Establish Care     Pt saw SARINA Escalante and would like to transfer care to Dr. Vaca.        History per MA reviewed.     Started getting muscle pain with Crestor and when he stopped it, it improved. Started red yeast rice and has not had a problem.    Followed by  for aneurysms. Per record review s/p repair of ascending aortic and great artery aneurysms, being followed for descending thoracic aortic aneurysm.    Hip pain x 4 years or more, worsening. Interferes with sleep. Feels deep in hip. Some burning at times. Previous provider did not do any imaging, pt has not had any work up of this pain.    Diabetes   He presents for his follow-up diabetic visit. He has type 2 diabetes mellitus. Pertinent negatives for diabetes include no chest pain. Risk factors for coronary artery disease include dyslipidemia, diabetes mellitus, hypertension, male sex and obesity. Current diabetic treatment includes oral agent (monotherapy). He is compliant with treatment all of the time. An ACE inhibitor/angiotensin II receptor blocker is being taken.   Hypertension   This is a chronic problem. The current episode started more than 1 year ago. The problem has been waxing and waning since onset. The problem is controlled. Pertinent negatives include no chest pain or shortness of breath. Agents associated with hypertension include NSAIDs. Risk factors for coronary artery disease include sedentary lifestyle, obesity, male gender and diabetes mellitus. Past treatments include ACE inhibitors. Current antihypertension treatment includes angiotensin blockers, calcium channel blockers, beta blockers and diuretics. The current treatment provides significant improvement. Compliance problems include exercise.           The following portions of the patient's  "history were reviewed and updated as appropriate: allergies, current medications, past family history, past medical history, past social history, past surgical history and problem list.    Review of Systems   Constitutional: Negative for fever.   Respiratory: Negative for cough and shortness of breath.    Cardiovascular: Negative for chest pain.   Gastrointestinal: Negative for abdominal pain.   Musculoskeletal: Positive for arthralgias and gait problem.        Limb pain   All other systems reviewed and are negative.      Objective   Visit Vitals  /75   Pulse 68   Temp 97.8 °F (36.6 °C) (Temporal)   Resp 18   Ht 185.4 cm (72.99\")   Wt 128 kg (282 lb 2 oz)   SpO2 97%   BMI 37.23 kg/m²       Physical Exam   Constitutional: He is oriented to person, place, and time. Vital signs are normal. He appears well-developed and well-nourished. He is active.  Non-toxic appearance. He does not have a sickly appearance. He does not appear ill. No distress. Face mask in place. He is obese.  HENT:   Head: Normocephalic and atraumatic.   Right Ear: Hearing normal.   Left Ear: Hearing normal.   Nose: Nose normal.   Eyes: EOM are normal. No scleral icterus.   Neck: Phonation normal. Neck supple.   Cardiovascular: Normal rate and regular rhythm.   Pulmonary/Chest: Effort normal and breath sounds normal.   Neurological: He is alert and oriented to person, place, and time. He displays no tremor. No cranial nerve deficit.   Skin: Skin is warm. He is not diaphoretic. No cyanosis. No pallor.   Psychiatric: He has a normal mood and affect. His speech is normal and behavior is normal. Judgment and thought content normal. Cognition and memory are normal.   Nursing note and vitals reviewed.      Lab Results   Component Value Date    HGBA1C 5.8 08/10/2020        Cardiology note 7/10/20 reviewed:  Assessment / Plan:      1.  Essential hypertension  --Currently well controlled  --Continue atenolol, Norvasc, HCTZ, and losartan     2.  " Hyperlipidemia LDL goal <100  --Continue high intensity statin     3.  AAA (abdominal aortic aneurysm)   --Follows regularly with  vascular surgery     4.  Type 2 diabetes mellitus   --Management per PCP    Reviewed  vascular notes, updated problem list.    Assessment/Plan     Problem List Items Addressed This Visit        Cardiovascular and Mediastinum    Essential hypertension    Current Assessment & Plan     Hypertension is improving with treatment.  Continue current treatment regimen.  Ambulatory blood pressure monitoring.  Goal blood pressure is under 130/80  Blood pressure will be reassessed at the next regular appointment.         Hyperlipidemia LDL goal <100    Overview     Associated with diabetes mellitus          Type 2 diabetes mellitus with circulatory disorder, with long-term current use of insulin (CMS/[facility]) - Primary    Overview     · Associated with hypertension, hyperlipidemia          Relevant Medications    glipizide (GLUCOTROL XL) 5 MG ER tablet    Other Relevant Orders    POC Glycosylated Hemoglobin (Hb A1C) (Completed)    Thoracic aortic aneurysm without rupture (CMS/[facility])    Overview     · Followed by vascular surgery at  (Dr. Mosley)  · S/p repair of ascending aortic and great artery aneurysms  · Thoracic aorta noted to be aneurysmal at isthmus and proximal descending areas (CTA chest 11/18/19)         Current Assessment & Plan     Follow up with          Ascending aortic aneurysm (CMS/[facility])    Overview     · S/p repair of ascending aortic and great artery repair  · Followed by  (Dr. Mosley)            Other    History of TIA (transient ischemic attack)    Overview     · Admitted at  9/2016         Statin intolerance    Overview     · Crestor led to myalgias           Other Visit Diagnoses     Right hip pain        Relevant Medications    gabapentin (NEURONTIN) 300 MG capsule    Other Relevant Orders    XR hip w or wo pelvis 2-3 view right (Completed)    Ambulatory Referral to  Orthopedic Surgery    Primary osteoarthritis of right hip        Relevant Orders    Ambulatory Referral to Orthopedic Surgery    Abnormal x-ray examination        Relevant Orders    Ambulatory Referral to Orthopedic Surgery          · Requesting past records  · Imaging today warrants referral to orthopedics  · Reviewed [facility] records follow up with Dr. Mosley  · Decrease glipizide to once day due to low A1C, risks of hypoglycemia  Return in about 3 months (around 11/13/2020) for Medicare Wellness.        Génesis Vaca MD

## 2020-08-11 PROBLEM — I71.20 THORACIC AORTIC ANEURYSM WITHOUT RUPTURE (HCC): Status: ACTIVE | Noted: 2020-07-13

## 2020-08-11 PROBLEM — Z78.9 STATIN INTOLERANCE: Status: ACTIVE | Noted: 2020-08-11

## 2020-08-11 PROBLEM — Z86.73 HISTORY OF TIA (TRANSIENT ISCHEMIC ATTACK): Status: ACTIVE | Noted: 2020-08-11

## 2020-08-11 PROBLEM — I71.21 ASCENDING AORTIC ANEURYSM: Status: ACTIVE | Noted: 2020-08-11

## 2020-08-31 ENCOUNTER — OFFICE VISIT (OUTPATIENT)
Dept: ORTHOPEDIC SURGERY | Facility: CLINIC | Age: 75
End: 2020-08-31

## 2020-08-31 VITALS — BODY MASS INDEX: 37.37 KG/M2 | RESPIRATION RATE: 16 BRPM | WEIGHT: 282 LBS | HEIGHT: 73 IN

## 2020-08-31 DIAGNOSIS — M16.11 PRIMARY OSTEOARTHRITIS OF RIGHT HIP: ICD-10-CM

## 2020-08-31 DIAGNOSIS — M25.551 ARTHRALGIA OF RIGHT HIP: Primary | ICD-10-CM

## 2020-08-31 PROCEDURE — 99214 OFFICE O/P EST MOD 30 MIN: CPT | Performed by: ORTHOPAEDIC SURGERY

## 2020-09-11 ENCOUNTER — TELEPHONE (OUTPATIENT)
Dept: ORTHOPEDIC SURGERY | Facility: CLINIC | Age: 75
End: 2020-09-11

## 2020-09-11 DIAGNOSIS — M25.551 ARTHRALGIA OF RIGHT HIP: Primary | ICD-10-CM

## 2020-09-14 RX ORDER — ATENOLOL 100 MG/1
200 TABLET ORAL DAILY
Qty: 60 TABLET | Refills: 5 | Status: SHIPPED | OUTPATIENT
Start: 2020-09-14 | End: 2021-03-31 | Stop reason: SDUPTHER

## 2020-09-14 NOTE — TELEPHONE ENCOUNTER
Caller: [facility] [address] - [phone] [facility] [phone] FX    Relationship: Pharmacy    Best call back number: [phone]    Medication needed:   Requested Prescriptions     Pending Prescriptions Disp Refills   • atenolol (TENORMIN) 100 MG tablet        Sig: Take 2 tablets by mouth Daily.       When do you need the refill by: ASAP    What details did the patient provide when requesting the medication: PATIENT IS COMPLETELY OUT  Does the patient have less than a 3 day supply:  [x] Yes  [] No    What is the patient's preferred pharmacy: [place], [place] - [address] - [phone] [facility] [phone] FX

## 2020-09-15 ENCOUNTER — CLINICAL SUPPORT (OUTPATIENT)
Dept: INTERNAL MEDICINE | Facility: CLINIC | Age: 75
End: 2020-09-15

## 2020-09-15 DIAGNOSIS — Z23 NEED FOR INFLUENZA VACCINATION: Primary | ICD-10-CM

## 2020-09-15 PROCEDURE — G0008 ADMIN INFLUENZA VIRUS VAC: HCPCS | Performed by: FAMILY MEDICINE

## 2020-09-15 PROCEDURE — 90694 VACC AIIV4 NO PRSRV 0.5ML IM: CPT | Performed by: FAMILY MEDICINE

## 2020-09-18 ENCOUNTER — HOSPITAL ENCOUNTER (OUTPATIENT)
Dept: GENERAL RADIOLOGY | Facility: HOSPITAL | Age: 75
Discharge: HOME | End: 2020-09-18
Admitting: ORTHOPAEDIC SURGERY

## 2020-09-18 PROCEDURE — 25010000003 LIDOCAINE 1 % SOLUTION: Performed by: ORTHOPAEDIC SURGERY

## 2020-09-18 PROCEDURE — 20610 DRAIN/INJ JOINT/BURSA W/O US: CPT | Performed by: ORTHOPAEDIC SURGERY

## 2020-09-18 PROCEDURE — 77002 NEEDLE LOCALIZATION BY XRAY: CPT | Performed by: ORTHOPAEDIC SURGERY

## 2020-09-18 PROCEDURE — 77002 NEEDLE LOCALIZATION BY XRAY: CPT

## 2020-09-18 RX ORDER — METHYLPREDNISOLONE ACETATE 80 MG/ML
80 INJECTION, SUSPENSION INTRA-ARTICULAR; INTRALESIONAL; INTRAMUSCULAR; SOFT TISSUE ONCE
Status: DISCONTINUED | OUTPATIENT
Start: 2020-09-18 | End: 2020-09-19 | Stop reason: HOSPADM

## 2020-09-18 RX ORDER — LIDOCAINE HYDROCHLORIDE 10 MG/ML
10 INJECTION, SOLUTION INFILTRATION; PERINEURAL ONCE
Status: COMPLETED | OUTPATIENT
Start: 2020-09-18 | End: 2020-09-18

## 2020-09-18 RX ADMIN — LIDOCAINE HYDROCHLORIDE 9 ML: 10 INJECTION, SOLUTION INFILTRATION; PERINEURAL at 15:11

## 2020-09-18 NOTE — POST-PROCEDURE NOTE
[facility]  [address], [address]  [place] [address]  [phone]        PROCEDURE REPORT        DIAGNOSIS:  Right hip osteoarthritis, symptomatic    PROCEDURE: Right  hip injection under flouroscopy      Bryan Roman with date of birth 1945 presents to [facility] Radiology Department today for injection therapy.        Patient presents to [facility] Radiology Department Flouroscopy Suite on 9/18/2020 for planned elective right hip injection under flouroscopy for symptomatic osteoarthritis.    Procedure:     After consent was obtained, and using ethyl chloride topical local anesthetic, the right hip was then prepped and draped with sterile technique. With an anterior hip approach, flouroscopy guidance, and care to stay lateral of the femoral artery, the hip joint was entered via a 20 gauge spinal needle.  A mixture of 80 mg methylprednisolone in one ml plus 9 ml of 1% plain Lidocaine was injected and the needle withdrawn. The procedure was well tolerated and without complication. The patient noted relief of focal hip joint pain.  The patient did remain stable and with baseline ambulation. The patient is asked to rest the joint for a few more days before resuming full regular activities. It may be painful for the first few days. Watch for fever, skin issues, increased swelling or persistent pain in the joint. Call or return to clinic if such symptoms occur, other concerns or if there is lack of improvement as anticipated.    Impression: Symptomatic right hip osteoarthritis.      Recommendations/Plan:      Treatment and patient advice as noted here and in office visit report.  Orthopedic activities reviewed and patient expressed appreciation.  Discussion of orthopedic goals.   Risk, benefits, and merits of treatment options reviewed and questions answered.  Call or notify for any adverse effect from injection therapy.    Exercise: As tolerated.  No strenuous  activity for a few days as appropriate.  Brace:  No brace was given at today's visit  Referral: No referrals made at today's visit  Studies: No additional studies ordered.  Surgery: No surgery proposed at this visit.  Activity:  May perform usual activities as tolerated.      Patient will return to our clinic at scheduled appointment.  Patient agreeable to call or return sooner for any concerns.

## 2020-10-08 DIAGNOSIS — I10 ESSENTIAL HYPERTENSION: Primary | ICD-10-CM

## 2020-10-08 RX ORDER — AMLODIPINE BESYLATE 10 MG/1
10 TABLET ORAL DAILY
Qty: 30 TABLET | Refills: 5 | Status: SHIPPED | OUTPATIENT
Start: 2020-10-08 | End: 2021-04-12

## 2020-10-08 NOTE — TELEPHONE ENCOUNTER
Caller: Bryan Roman    Relationship: Self    Best call back number: [phone]  Medication needed:   Requested Prescriptions     Pending Prescriptions Disp Refills   • amLODIPine (NORVASC) 10 MG tablet        Sig: Take 1 tablet by mouth Daily.       When do you need the refill by: ASAP    What details did the patient provide when requesting the medication:     Does the patient have less than a 3 day supply:  [x] Yes  [] No    What is the patient's preferred pharmacy:      [address] - [phone] [facility] [phone]

## 2020-11-06 ENCOUNTER — OFFICE VISIT (OUTPATIENT)
Dept: UROLOGY | Facility: CLINIC | Age: 75
End: 2020-11-06

## 2020-11-06 VITALS
HEART RATE: 65 BPM | DIASTOLIC BLOOD PRESSURE: 72 MMHG | SYSTOLIC BLOOD PRESSURE: 129 MMHG | HEIGHT: 73 IN | BODY MASS INDEX: 37.37 KG/M2 | TEMPERATURE: 97.4 F | WEIGHT: 282 LBS | OXYGEN SATURATION: 99 %

## 2020-11-06 DIAGNOSIS — N40.0 BENIGN PROSTATIC HYPERPLASIA, UNSPECIFIED WHETHER LOWER URINARY TRACT SYMPTOMS PRESENT: Primary | ICD-10-CM

## 2020-11-06 LAB
BILIRUB BLD-MCNC: NEGATIVE MG/DL
CLARITY, POC: CLEAR
COLOR UR: YELLOW
GLUCOSE UR STRIP-MCNC: ABNORMAL MG/DL
KETONES UR QL: NEGATIVE
LEUKOCYTE EST, POC: NEGATIVE
NITRITE UR-MCNC: NEGATIVE MG/ML
PH UR: 5.5 [PH] (ref 5–8)
PROT UR STRIP-MCNC: ABNORMAL MG/DL
RBC # UR STRIP: NEGATIVE /UL
SP GR UR: 1.02 (ref 1–1.03)
UROBILINOGEN UR QL: NORMAL

## 2020-11-06 PROCEDURE — 99213 OFFICE O/P EST LOW 20 MIN: CPT | Performed by: UROLOGY

## 2020-11-06 PROCEDURE — 81001 URINALYSIS AUTO W/SCOPE: CPT | Performed by: UROLOGY

## 2020-11-06 NOTE — PROGRESS NOTES
Chief Complaint  Benign Prostatic Hypertrophy        PHIL Roman is a 75 y.o. male who returns today for an annual prostate screening generally doing well.  He has had no difficulty voiding since TURP some 10 years ago.  His PSA was 3.8 last year and will be repeated today.    Vitals:    11/06/20 1130   BP: 129/72   Pulse: 65   Temp: 97.4 °F (36.3 °C)   SpO2: 99%       Past Medical History  Past Medical History:   Diagnosis Date   • Aneurysm (CMS/HCC)     aortic aneyrtysm ascending and transverse still has desending  watched by dr holloway at    • Arthritis    • Diabetes mellitus (CMS/HCC)    • Elevated cholesterol    • GERD (gastroesophageal reflux disease)    • Gout    • Heart disease    • Hyperlipidemia    • Hypertension    • Temporal arteritis (CMS/HCC)    • TIA (transient ischemic attack)     3 years ago   • Vocal cord paralysis     left during open heart surgery       Past Surgical History  Past Surgical History:   Procedure Laterality Date   • APPENDECTOMY     • ARTERIAL ANEURYSM REPAIR     • CHOLECYSTECTOMY  2001   • HERNIA REPAIR  1987   • KNEE SURGERY Right    • ORIF WRIST FRACTURE Left 6/27/2018    Procedure: OPEN REDUCTION INTERNAL FIXATION LEFT WRIST;  Surgeon: Narinder Lewis MD;  Location: [facility];  Service: Orthopedics   • THROAT SURGERY  12/30/2015       Medications  has a current medication list which includes the following prescription(s): amlodipine, aspirin, atenolol, blood glucose monitoring suppl, fluad, gabapentin, glipizide, blood glucose test, hydrochlorothiazide, indomethacin, lancets, losartan, red yeast rice, and truetest test.      Allergies  Allergies   Allergen Reactions   • Ampicillin Rash   • Lisinopril Unknown (See Comments)     Pt does not know--says wife knows     • Niacin And Related Rash   • Norco [Hydrocodone-Acetaminophen] Itching   • Penicillins Rash       Social History  Social History     Socioeconomic History   • Marital status:      Spouse name: Not on file   •  Number of children: Not on file   • Years of education: Not on file   • Highest education level: Not on file   Tobacco Use   • Smoking status: Former Smoker     Quit date: 1967     Years since quittin.8   • Smokeless tobacco: Never Used   Substance and Sexual Activity   • Alcohol use: No   • Drug use: No   • Sexual activity: Defer   Social History Narrative    Right hand dominant       Family History  He has no family history of bladder or kidney cancer  He has no family history of kidney stones      AUA Symptom Score:      Review of Systems  Review of Systems   Constitutional: Negative for activity change, appetite change, chills, fatigue, fever, unexpected weight gain and unexpected weight loss.   Respiratory: Negative for apnea, cough, chest tightness, shortness of breath, wheezing and stridor.    Cardiovascular: Negative for chest pain, palpitations and leg swelling.   Gastrointestinal: Negative for abdominal distention, abdominal pain, anal bleeding, blood in stool, constipation, diarrhea, nausea, rectal pain, vomiting, GERD and indigestion.   Genitourinary: Negative for decreased libido, decreased urine volume, difficulty urinating, discharge, dysuria, flank pain, frequency, genital sores, hematuria, nocturia, penile pain, erectile dysfunction, penile swelling, scrotal swelling, testicular pain, urgency and urinary incontinence.   Musculoskeletal: Negative for back pain and joint swelling.   Neurological: Negative for tremors, seizures, speech difficulty, weakness and numbness.   Psychiatric/Behavioral: Negative for agitation, decreased concentration, sleep disturbance, depressed mood and stress. The patient is not nervous/anxious.        Physical Exam  Physical Exam  Vitals signs reviewed.   Constitutional:       Appearance: He is well-developed.   HENT:      Head: Normocephalic and atraumatic.   Neck:      Musculoskeletal: Normal range of motion.   Pulmonary:      Effort: Pulmonary effort is normal.  No respiratory distress.   Abdominal:      General: There is no distension.      Palpations: Abdomen is soft. There is no mass.      Tenderness: There is no abdominal tenderness.      Hernia: No hernia is present.   Genitourinary:     Prostate: Normal.      Rectum: Normal.   Musculoskeletal: Normal range of motion.   Lymphadenopathy:      Cervical: No cervical adenopathy.   Skin:     General: Skin is warm and dry.   Neurological:      Mental Status: He is alert and oriented to person, place, and time.   Psychiatric:         Behavior: Behavior normal.         Labs Recent and today in the office:  Results for orders placed or performed in visit on 11/06/20   POC Urinalysis Dipstick, Automated    Specimen: Urine   Result Value Ref Range    Color Yellow Yellow, Straw, Dark Yellow, Phylils    Clarity, UA Clear Clear    Specific Gravity  1.025 1.005 - 1.030    pH, Urine 5.5 5.0 - 8.0    Leukocytes Negative Negative    Nitrite, UA Negative Negative    Protein, POC 1+ (A) Negative mg/dL    Glucose, UA 1+ (A) Negative, 1000 mg/dL (3+) mg/dL    Ketones, UA Negative Negative    Urobilinogen, UA Normal Normal    Bilirubin Negative Negative    Blood, UA Negative Negative         Assessment & Plan  BPH: Digital rectal exam is benign and a PSA submitted.  He is currently voiding without medication and will return on an annual basis.

## 2020-11-07 LAB — PSA SERPL-MCNC: 4.22 NG/ML (ref 0–4)

## 2020-11-10 ENCOUNTER — LAB (OUTPATIENT)
Dept: LAB | Facility: HOSPITAL | Age: 75
End: 2020-11-10

## 2020-11-10 ENCOUNTER — OFFICE VISIT (OUTPATIENT)
Dept: INTERNAL MEDICINE | Facility: CLINIC | Age: 75
End: 2020-11-10

## 2020-11-10 VITALS
HEIGHT: 73 IN | DIASTOLIC BLOOD PRESSURE: 65 MMHG | OXYGEN SATURATION: 98 % | HEART RATE: 62 BPM | SYSTOLIC BLOOD PRESSURE: 109 MMHG | WEIGHT: 285.25 LBS | BODY MASS INDEX: 37.81 KG/M2

## 2020-11-10 DIAGNOSIS — I71.20 THORACIC AORTIC ANEURYSM WITHOUT RUPTURE (HCC): ICD-10-CM

## 2020-11-10 DIAGNOSIS — Z91.81 AT HIGH RISK FOR FALLS: ICD-10-CM

## 2020-11-10 DIAGNOSIS — I10 ESSENTIAL HYPERTENSION: ICD-10-CM

## 2020-11-10 DIAGNOSIS — E78.5 HYPERLIPIDEMIA LDL GOAL <100: ICD-10-CM

## 2020-11-10 DIAGNOSIS — I71.21 ASCENDING AORTIC ANEURYSM (HCC): ICD-10-CM

## 2020-11-10 DIAGNOSIS — M16.11 PRIMARY OSTEOARTHRITIS OF RIGHT HIP: ICD-10-CM

## 2020-11-10 DIAGNOSIS — Z11.59 NEED FOR HEPATITIS C SCREENING TEST: ICD-10-CM

## 2020-11-10 DIAGNOSIS — Z00.00 MEDICARE ANNUAL WELLNESS VISIT, SUBSEQUENT: Primary | ICD-10-CM

## 2020-11-10 DIAGNOSIS — Z79.4 TYPE 2 DIABETES MELLITUS WITH OTHER CIRCULATORY COMPLICATION, WITH LONG-TERM CURRENT USE OF INSULIN (HCC): ICD-10-CM

## 2020-11-10 DIAGNOSIS — E11.59 TYPE 2 DIABETES MELLITUS WITH OTHER CIRCULATORY COMPLICATION, WITH LONG-TERM CURRENT USE OF INSULIN (HCC): ICD-10-CM

## 2020-11-10 DIAGNOSIS — Z78.9 STATIN INTOLERANCE: ICD-10-CM

## 2020-11-10 DIAGNOSIS — M25.551 RIGHT HIP PAIN: ICD-10-CM

## 2020-11-10 DIAGNOSIS — L30.9 DERMATITIS: ICD-10-CM

## 2020-11-10 DIAGNOSIS — E66.01 CLASS 2 SEVERE OBESITY DUE TO EXCESS CALORIES WITH SERIOUS COMORBIDITY AND BODY MASS INDEX (BMI) OF 37.0 TO 37.9 IN ADULT (HCC): ICD-10-CM

## 2020-11-10 LAB
ALBUMIN SERPL-MCNC: 4.3 G/DL (ref 3.5–5.2)
ALBUMIN/GLOB SERPL: 1.7 G/DL
ALP SERPL-CCNC: 93 U/L (ref 39–117)
ALT SERPL W P-5'-P-CCNC: 18 U/L (ref 1–41)
ANION GAP SERPL CALCULATED.3IONS-SCNC: 12.1 MMOL/L (ref 5–15)
AST SERPL-CCNC: 18 U/L (ref 1–40)
BASOPHILS # BLD AUTO: 0.02 10*3/MM3 (ref 0–0.2)
BASOPHILS NFR BLD AUTO: 0.3 % (ref 0–1.5)
BILIRUB SERPL-MCNC: 0.8 MG/DL (ref 0–1.2)
BUN SERPL-MCNC: 29 MG/DL (ref 8–23)
BUN/CREAT SERPL: 22.8 (ref 7–25)
CALCIUM SPEC-SCNC: 9.4 MG/DL (ref 8.6–10.5)
CHLORIDE SERPL-SCNC: 101 MMOL/L (ref 98–107)
CHOLEST SERPL-MCNC: 180 MG/DL (ref 0–200)
CO2 SERPL-SCNC: 24.9 MMOL/L (ref 22–29)
CREAT SERPL-MCNC: 1.27 MG/DL (ref 0.76–1.27)
DEPRECATED RDW RBC AUTO: 47.4 FL (ref 37–54)
EOSINOPHIL # BLD AUTO: 0.08 10*3/MM3 (ref 0–0.4)
EOSINOPHIL NFR BLD AUTO: 1.1 % (ref 0.3–6.2)
ERYTHROCYTE [DISTWIDTH] IN BLOOD BY AUTOMATED COUNT: 14.1 % (ref 12.3–15.4)
GFR SERPL CREATININE-BSD FRML MDRD: 55 ML/MIN/1.73
GLOBULIN UR ELPH-MCNC: 2.6 GM/DL
GLUCOSE SERPL-MCNC: 113 MG/DL (ref 65–99)
HBA1C MFR BLD: 6.1 %
HCT VFR BLD AUTO: 44.9 % (ref 37.5–51)
HCV AB SER DONR QL: NORMAL
HDLC SERPL-MCNC: 26 MG/DL (ref 40–60)
HGB BLD-MCNC: 15 G/DL (ref 13–17.7)
IMM GRANULOCYTES # BLD AUTO: 0.06 10*3/MM3 (ref 0–0.05)
IMM GRANULOCYTES NFR BLD AUTO: 0.9 % (ref 0–0.5)
LDLC SERPL CALC-MCNC: 81 MG/DL (ref 0–100)
LDLC/HDLC SERPL: 2.46 {RATIO}
LYMPHOCYTES # BLD AUTO: 1.06 10*3/MM3 (ref 0.7–3.1)
LYMPHOCYTES NFR BLD AUTO: 15.1 % (ref 19.6–45.3)
MCH RBC QN AUTO: 30.5 PG (ref 26.6–33)
MCHC RBC AUTO-ENTMCNC: 33.4 G/DL (ref 31.5–35.7)
MCV RBC AUTO: 91.4 FL (ref 79–97)
MONOCYTES # BLD AUTO: 0.57 10*3/MM3 (ref 0.1–0.9)
MONOCYTES NFR BLD AUTO: 8.1 % (ref 5–12)
NEUTROPHILS NFR BLD AUTO: 5.22 10*3/MM3 (ref 1.7–7)
NEUTROPHILS NFR BLD AUTO: 74.5 % (ref 42.7–76)
NRBC BLD AUTO-RTO: 0 /100 WBC (ref 0–0.2)
PLATELET # BLD AUTO: 224 10*3/MM3 (ref 140–450)
PMV BLD AUTO: 10.8 FL (ref 6–12)
POTASSIUM SERPL-SCNC: 4 MMOL/L (ref 3.5–5.2)
PROT SERPL-MCNC: 6.9 G/DL (ref 6–8.5)
RBC # BLD AUTO: 4.91 10*6/MM3 (ref 4.14–5.8)
SODIUM SERPL-SCNC: 138 MMOL/L (ref 136–145)
TRIGL SERPL-MCNC: 450 MG/DL (ref 0–150)
VLDLC SERPL-MCNC: 73 MG/DL (ref 5–40)
WBC # BLD AUTO: 7.01 10*3/MM3 (ref 3.4–10.8)

## 2020-11-10 PROCEDURE — 96160 PT-FOCUSED HLTH RISK ASSMT: CPT | Performed by: FAMILY MEDICINE

## 2020-11-10 PROCEDURE — 80053 COMPREHEN METABOLIC PANEL: CPT | Performed by: FAMILY MEDICINE

## 2020-11-10 PROCEDURE — 80061 LIPID PANEL: CPT | Performed by: FAMILY MEDICINE

## 2020-11-10 PROCEDURE — 83036 HEMOGLOBIN GLYCOSYLATED A1C: CPT | Performed by: FAMILY MEDICINE

## 2020-11-10 PROCEDURE — 86803 HEPATITIS C AB TEST: CPT | Performed by: FAMILY MEDICINE

## 2020-11-10 PROCEDURE — 36415 COLL VENOUS BLD VENIPUNCTURE: CPT | Performed by: FAMILY MEDICINE

## 2020-11-10 PROCEDURE — G0439 PPPS, SUBSEQ VISIT: HCPCS | Performed by: FAMILY MEDICINE

## 2020-11-10 PROCEDURE — 85025 COMPLETE CBC W/AUTO DIFF WBC: CPT | Performed by: FAMILY MEDICINE

## 2020-11-10 RX ORDER — TRIAMCINOLONE ACETONIDE 1 MG/G
CREAM TOPICAL 2 TIMES DAILY
Qty: 80 G | Refills: 0 | Status: SHIPPED | OUTPATIENT
Start: 2020-11-10 | End: 2022-11-23

## 2020-11-10 RX ORDER — GABAPENTIN 300 MG/1
300 CAPSULE ORAL NIGHTLY PRN
Qty: 90 CAPSULE | Refills: 1 | Status: SHIPPED | OUTPATIENT
Start: 2020-11-10 | End: 2021-04-07

## 2020-11-10 NOTE — PROGRESS NOTES
The ABCs of the Annual Wellness Visit  Subsequent Medicare Wellness Visit    Chief Complaint   Patient presents with   • Medicare Wellness-subsequent       Subjective   History of Present Illness:  Bryan Roman is a 75 y.o. male who presents for a Subsequent Medicare Wellness Visit.    Followed by vascular at  due to aortic aneurysm, one being followed closely, has had repair in past as well. Using red yeast rice instead of statin for hyperlipidemia due to side effects. Hypertension controlled.     Continues oral agent for diabetes mellitus.     Continues to have severe hip pain, seeing orthopedics. Pain burns. Takes tylenol multiple times a day but doesn't really help. He never picked up the gabapentin, did not recall what it was for.     HEALTH RISK ASSESSMENT    Recent Hospitalizations:  No hospitalization(s) within the last year.    Current Medical Providers:  Patient Care Team:  Génesis Vaca MD as PCP - General (Family Medicine)  Imer Guidry MD as Consulting Physician (Cardiology)  Marcial Mosley MD (Vascular Surgery)  Christiana Hernandez OD as Consulting Physician (Optometry)  Julio Cesar Haywood MD as Consulting Physician (Urology)    Smoking Status:  Social History     Tobacco Use   Smoking Status Former Smoker   • Quit date: 1967   • Years since quittin.8   Smokeless Tobacco Never Used       Alcohol Consumption:  Social History     Substance and Sexual Activity   Alcohol Use No       Depression Screen:   PHQ-2/PHQ-9 Depression Screening 11/10/2020   Little interest or pleasure in doing things 0   Feeling down, depressed, or hopeless 0   Total Score 0       Fall Risk Screen:  AGUSTINADI Fall Risk Assessment was completed, and patient is at MODERATE risk for falls. Assessment completed on:11/10/2020    Health Habits and Functional and Cognitive Screening:  Functional & Cognitive Status 11/10/2020   Do you have difficulty preparing food and eating? No   Do you have difficulty bathing  yourself, getting dressed or grooming yourself? No   Do you have difficulty using the toilet? No   Do you have difficulty moving around from place to place? No   Do you have trouble with steps or getting out of a bed or a chair? No   Current Diet Well Balanced Diet   Dental Exam Up to date   Eye Exam Up to date   Exercise (times per week) 3 times per week   Current Exercise Activities Include Walking   Do you need help using the phone?  No   Are you deaf or do you have serious difficulty hearing?  No   Do you need help with transportation? No   Do you need help shopping? No   Do you need help preparing meals?  No   Do you need help with housework?  No   Do you need help with laundry? No   Do you need help taking your medications? No   Do you need help managing money? No   Do you ever drive or ride in a car without wearing a seat belt? No   Have you felt unusual stress, anger or loneliness in the last month? No   Who do you live with? Spouse   If you need help, do you have trouble finding someone available to you? No   Have you been bothered in the last four weeks by sexual problems? No   Do you have difficulty concentrating, remembering or making decisions? No         Does the patient have evidence of cognitive impairment? No    Asprin use counseling:Taking ASA appropriately as indicated    Age-appropriate Screening Schedule:  Refer to the list below for future screening recommendations based on patient's age, sex and/or medical conditions. Orders for these recommended tests are listed in the plan section. The patient has been provided with a written plan.    Health Maintenance   Topic Date Due   • URINE MICROALBUMIN  1945   • DIABETIC FOOT EXAM  11/07/2017   • LIPID PANEL  11/07/2017   • TDAP/TD VACCINES (1 - Tdap) 11/01/2021 (Originally 4/11/1964)   • ZOSTER VACCINE (1 of 2) 11/19/2021 (Originally 4/11/1996)   • HEMOGLOBIN A1C  02/10/2021   • DIABETIC EYE EXAM  08/01/2021   • INFLUENZA VACCINE  Completed   •  COLONOSCOPY  Discontinued          The following portions of the patient's history were reviewed and updated as appropriate: allergies, current medications, past family history, past medical history, past social history, past surgical history and problem list.    Outpatient Medications Prior to Visit   Medication Sig Dispense Refill   • amLODIPine (NORVASC) 10 MG tablet Take 1 tablet by mouth Daily. 30 tablet 5   • aspirin 81 MG EC tablet Take 81 mg by mouth Daily.     • atenolol (TENORMIN) 100 MG tablet Take 2 tablets by mouth Daily. 60 tablet 5   • Blood Glucose Monitoring Suppl device      • glipizide (GLUCOTROL XL) 5 MG ER tablet Take 1 tablet by mouth Daily. 90 tablet 3   • Glucose Blood (BLOOD GLUCOSE TEST) strip      • hydrochlorothiazide (HYDRODIURIL) 25 MG tablet Take 25 mg by mouth daily.     • indomethacin (INDOCIN) 50 MG capsule      • Lancets misc      • losartan (COZAAR) 100 MG tablet Take 100 mg by mouth.     • Red Yeast Rice 600 MG capsule Take  by mouth.     • TRUETEST TEST test strip 1 each by Other route as needed.     • gabapentin (NEURONTIN) 300 MG capsule Take 1 capsule by mouth At Night As Needed (hip pain). 90 capsule 1   • FLUAD 0.5 ML suspension prefilled syringe NOW  0     No facility-administered medications prior to visit.        Patient Active Problem List   Diagnosis   • Essential hypertension   • Hyperlipidemia LDL goal <100   • Type 2 diabetes mellitus with circulatory disorder, with long-term current use of insulin (CMS/HCC)   • Thoracic aortic aneurysm without rupture (CMS/HCC)   • GERD (gastroesophageal reflux disease)   • Raised prostate specific antigen   • Ascending aortic aneurysm (CMS/HCC)   • History of TIA (transient ischemic attack)   • Statin intolerance       Advanced Care Planning:  ACP discussion was held with the patient during this visit. Patient does not have an advance directive, information provided.    Review of Systems   Constitutional: Positive for fatigue.  "Negative for fever.   Respiratory: Negative for shortness of breath.    Cardiovascular: Negative for chest pain.   Musculoskeletal: Positive for arthralgias and gait problem.   Skin: Positive for rash (ears itchy, used triamcinolone in past which helped, needs refill).   Psychiatric/Behavioral:        +stress   All other systems reviewed and are negative.      Compared to one year ago, the patient feels his physical health is better.  Compared to one year ago, the patient feels his mental health is better.    Reviewed chart for potential of high risk medication in the elderly: yes  Reviewed chart for potential of harmful drug interactions in the elderly:yes    Objective         Vitals:    11/10/20 1322   BP: 109/65   Pulse: 62   SpO2: 98%   Weight: 129 kg (285 lb 4 oz)   Height: 185.4 cm (72.99\")   PainSc:   2       Body mass index is 37.64 kg/m².  Discussed the patient's BMI with him. The BMI is above average; BMI management plan is completed.    Physical Exam  Vitals signs and nursing note reviewed.   Constitutional:       General: He is not in acute distress.     Appearance: Normal appearance. He is well-developed and well-groomed. He is obese. He is not ill-appearing, toxic-appearing or diaphoretic.      Interventions: Face mask in place.   HENT:      Head: Normocephalic and atraumatic.      Right Ear: Hearing and tympanic membrane normal.      Left Ear: Hearing and tympanic membrane normal.      Ears:      Comments: External canals dry, flaky, excoriated bilaterally  Eyes:      General: Lids are normal. Gaze aligned appropriately. No scleral icterus.        Right eye: No discharge.         Left eye: No discharge.      Extraocular Movements: Extraocular movements intact.      Conjunctiva/sclera: Conjunctivae normal.      Pupils: Pupils are equal, round, and reactive to light.   Neck:      Musculoskeletal: Neck supple.      Thyroid: No thyromegaly.      Trachea: Phonation normal.   Cardiovascular:      Rate and " Rhythm: Normal rate and regular rhythm.      Heart sounds: Normal heart sounds.   Pulmonary:      Effort: Pulmonary effort is normal.      Breath sounds: Normal breath sounds and air entry.   Chest:      Chest wall: No tenderness.   Abdominal:      General: Bowel sounds are normal. There is no distension.      Palpations: Abdomen is soft. Abdomen is not rigid. There is no mass.      Tenderness: There is no abdominal tenderness. There is no guarding or rebound.   Musculoskeletal:         General: No deformity.   Lymphadenopathy:      Cervical: No cervical adenopathy.   Skin:     General: Skin is warm.      Capillary Refill: Capillary refill takes less than 2 seconds.      Coloration: Skin is not cyanotic, jaundiced or pale.      Findings: No rash.   Neurological:      General: No focal deficit present.      Mental Status: He is alert and oriented to person, place, and time. Mental status is at baseline.      Cranial Nerves: No dysarthria.      Motor: No tremor, abnormal muscle tone or seizure activity.      Gait: Gait abnormal.   Psychiatric:         Attention and Perception: Attention and perception normal.         Mood and Affect: Mood and affect normal.         Speech: Speech normal.         Behavior: Behavior normal. Behavior is cooperative.         Thought Content: Thought content normal.         Cognition and Memory: Cognition and memory normal.         Judgment: Judgment normal.         Lab Results   Component Value Date    HGBA1C 6.1 11/10/2020        Assessment/Plan   Medicare Risks and Personalized Health Plan  CMS Preventative Services Quick Reference  Advance Directive Discussion  Cardiovascular risk  Chronic Pain   Colon Cancer Screening  Dementia/Memory   Depression/Dysphoria  Diabetic Lab Screening   Fall Risk  Immunizations Discussed/Encouraged (specific immunizations; Td and Shingrix )  Inactivity/Sedentary  Obesity/Overweight   Polypharmacy    The above risks/problems have been discussed with the  patient.  Pertinent information has been shared with the patient in the After Visit Summary.  Follow up plans and orders are seen below in the Assessment/Plan Section.    Diagnoses and all orders for this visit:    1. Medicare annual wellness visit, subsequent (Primary)    2. Thoracic aortic aneurysm without rupture (CMS/HCC)  Comments:  follow up with     3. Ascending aortic aneurysm (CMS/[facility])  Comments:  follow up with     4. Type 2 diabetes mellitus with other circulatory complication, with long-term current use of insulin (CMS/[facility])  Comments:  stable, continue current medicine  Orders:  -     POC Glycosylated Hemoglobin (Hb A1C)  -     Comprehensive Metabolic Panel  -     Lipid Panel  -     CBC & Differential  -     CBC Auto Differential    5. Class 2 severe obesity due to excess calories with serious comorbidity and body mass index (BMI) of 37.0 to 37.9 in adult (CMS/[facility])    6. Right hip pain  Comments:  discussed gabapentin  Orders:  -     gabapentin (NEURONTIN) 300 MG capsule; Take 1 capsule by mouth At Night As Needed (hip pain).  Dispense: 90 capsule; Refill: 1    7. Primary osteoarthritis of right hip  Comments:  follow up with orthopedics    8. Essential hypertension  -     Comprehensive Metabolic Panel  -     Lipid Panel  -     CBC & Differential  -     CBC Auto Differential    9. Hyperlipidemia LDL goal <100  Comments:  continue red yeast rice but discussed briefly that it doesn't have proven benefits like statins  Orders:  -     Comprehensive Metabolic Panel  -     Lipid Panel    10. Dermatitis  -     triamcinolone (KENALOG) 0.1 % cream; Apply  topically to the appropriate area as directed 2 (Two) Times a Day.  Dispense: 80 g; Refill: 0    11. Statin intolerance    12. Need for hepatitis C screening test  -     Hepatitis C Antibody    13. At high risk for falls      Follow Up:  Return in about 3 months (around 2/13/2021) for Diabetes follow up.     An After Visit Summary and PPPS were given to the  patient.

## 2020-11-10 NOTE — PATIENT INSTRUCTIONS
Medicare Wellness  Personal Prevention Plan of Service     Date of Office Visit:  11/10/2020  Encounter Provider:  Génesis Vaca MD  Place of Service:  [facility]  Patient Name: Bryan Roman  :  1945    As part of the Medicare Wellness portion of your visit today, we are providing you with this personalized preventive plan of services (PPPS). This plan is based upon recommendations of the [place] Preventive Services Task Force (USPSTF) and the Advisory Committee on Immunization Practices (ACIP).    This lists the preventive care services that should be considered, and provides dates of when you are due. Items listed as completed are up-to-date and do not require any further intervention.    Health Maintenance   Topic Date Due   • URINE MICROALBUMIN  1945   • HEPATITIS C SCREENING  2017   • ANNUAL WELLNESS VISIT  2017   • DIABETIC FOOT EXAM  2017   • LIPID PANEL  2017   • TDAP/TD VACCINES (1 - Tdap) 2021 (Originally 1965)   • ZOSTER VACCINE (1 of 2) 2021 (Originally 1996)   • HEMOGLOBIN A1C  02/10/2021   • DIABETIC EYE EXAM  2021   • INFLUENZA VACCINE  Completed   • Pneumococcal Vaccine 65+  Completed   • AAA SCREEN (ONE-TIME)  Completed   • COLONOSCOPY  Discontinued       Orders Placed This Encounter   Procedures   • Comprehensive Metabolic Panel   • Lipid Panel     Order Specific Question:   [facility] Has the patient fasted?     Answer:   Yes   • Hepatitis C Antibody   • POC Glycosylated Hemoglobin (Hb A1C)   • CBC & Differential     Order Specific Question:   Manual Differential     Answer:   No       Return in about 3 months (around 2021) for Diabetes follow up.            Fall Prevention in the Home, Adult  Falls can cause injuries and can affect people from all age groups. There are many simple things that you can do to make your home safe and to help prevent falls. Ask for help when making  these changes, if needed.  What actions can I take to prevent falls?  General instructions  · Use good lighting in all rooms. Replace any light bulbs that burn out.  · Turn on lights if it is dark. Use night-lights.  · Place frequently used items in easy-to-reach places. Lower the shelves around your home if necessary.  · Set up furniture so that there are clear paths around it. Avoid moving your furniture around.  · Remove throw rugs and other tripping hazards from the floor.  · Avoid walking on wet floors.  · Fix any uneven floor surfaces.  · Add color or contrast paint or tape to grab bars and handrails in your home. Place contrasting color strips on the first and last steps of stairways.  · When you use a stepladder, make sure that it is completely opened and that the sides are firmly locked. Have someone hold the ladder while you are using it. Do not climb a closed stepladder.  · Be aware of any and all pets.  What can I do in the bathroom?         · Keep the floor dry. Immediately clean up any water that spills onto the floor.  · Remove soap buildup in the tub or shower on a regular basis.  · Use non-skid mats or decals on the floor of the tub or shower.  · Attach bath mats securely with double-sided, non-slip rug tape.  · If you need to sit down while you are in the shower, use a plastic, non-slip stool.  · Install grab bars by the toilet and in the tub and shower. Do not use towel bars as grab bars.  What can I do in the bedroom?  · Make sure that a bedside light is easy to reach.  · Do not use oversized bedding that drapes onto the floor.  · Have a firm chair that has side arms to use for getting dressed.  What can I do in the kitchen?  · Clean up any spills right away.  · If you need to reach for something above you, use a sturdy step stool that has a grab bar.  · Keep electrical cables out of the way.  · Do not use floor polish or wax that makes floors slippery. If you must use wax, make sure that it is  non-skid floor wax.  What can I do in the stairways?  · Do not leave any items on the stairs.  · Make sure that you have a light switch at the top of the stairs and the bottom of the stairs. Have them installed if you do not have them.  · Make sure that there are handrails on both sides of the stairs. Fix handrails that are broken or loose. Make sure that handrails are as long as the stairways.  · Install non-slip stair treads on all stairs in your home.  · Avoid having throw rugs at the top or bottom of stairways, or secure the rugs with carpet tape to prevent them from moving.  · Choose a carpet design that does not hide the edge of steps on the stairway.  · Check any carpeting to make sure that it is firmly attached to the stairs. Fix any carpet that is loose or worn.  What can I do on the outside of my home?  · Use bright outdoor lighting.  · Regularly repair the edges of walkways and driveways and fix any cracks.  · Remove high doorway thresholds.  · Trim any shrubbery on the main path into your home.  · Regularly check that handrails are securely fastened and in good repair. Both sides of any steps should have handrails.  · Install guardrails along the edges of any raised decks or porches.  · Clear walkways of debris and clutter, including tools and rocks.  · Have leaves, snow, and ice cleared regularly.  · Use sand or salt on walkways during winter months.  · In the garage, clean up any spills right away, including grease or oil spills.  What other actions can I take?  · Wear closed-toe shoes that fit well and support your feet. Wear shoes that have rubber soles or low heels.  · Use mobility aids as needed, such as canes, walkers, scooters, and crutches.  · Review your medicines with your health care provider. Some medicines can cause dizziness or changes in blood pressure, which increase your risk of falling.  Talk with your health care provider about other ways that you can decrease your risk of falls. This  may include working with a physical therapist or  to improve your strength, balance, and endurance.  Where to find more information  · Centers for Disease Control and Prevention, STEADI: https://www.cdc.gov  · National [place] on Aging: https://sl2rezw.mckayla.nih.gov  Contact a health care provider if:  · You are afraid of falling at home.  · You feel weak, drowsy, or dizzy at home.  · You fall at home.  Summary  · There are many simple things that you can do to make your home safe and to help prevent falls.  · Ways to make your home safe include removing tripping hazards and installing grab bars in the bathroom.  · Ask for help when making these changes in your home.  This information is not intended to replace advice given to you by your health care provider. Make sure you discuss any questions you have with your health care provider.  Document Released: 12/08/2003 Document Revised: 11/30/2018 Document Reviewed: 08/02/2018  Elsevier Patient Education © 2020 [employer].

## 2020-11-11 NOTE — PROGRESS NOTES
Please let him know labs overall not worrisome. His total cholesterol and LDL (bad cholesterol) are at a good level. Triglycerides still high, though. Should add fish oil or I can send what is called a fibrate (not a statin). If agreeable let me know. Wife also has a result note.

## 2020-11-24 ENCOUNTER — TELEPHONE (OUTPATIENT)
Dept: ORTHOPEDICS | Facility: OTHER | Age: 75
End: 2020-11-24

## 2020-11-24 DIAGNOSIS — M16.11 PRIMARY OSTEOARTHRITIS OF RIGHT HIP: Primary | ICD-10-CM

## 2020-11-24 NOTE — TELEPHONE ENCOUNTER
PT. STATES THAT HE GOT A SPECIAL INJECTION AT THE HOSPITAL IN HIS RIGHT HIP 9/18/20.  HE IS STILL HAVING PAIN.   PT. ASKING WHAT DR. CHAUDHARY ADVISES.   PT# [phone]

## 2020-11-24 NOTE — TELEPHONE ENCOUNTER
Spoke with patient, he states the injection helped to relieve pain for about 7 weeks. He says the pain came back about 2 weeks ago. He would like to know when he can have another one. Next injection date is 12/18/20.

## 2020-11-25 NOTE — TELEPHONE ENCOUNTER
Scheduled right hip fluoro injection on 12/18/20 @ 2:15 pm. Patient to arrive at hospital registration at 2:00 pm. LM for pt to return call so he can be notified of appt.

## 2020-12-15 DIAGNOSIS — I10 ESSENTIAL HYPERTENSION: Primary | ICD-10-CM

## 2020-12-15 RX ORDER — LOSARTAN POTASSIUM 100 MG/1
100 TABLET ORAL DAILY
Qty: 90 TABLET | Refills: 3 | Status: SHIPPED | OUTPATIENT
Start: 2020-12-15 | End: 2021-06-01 | Stop reason: SDUPTHER

## 2020-12-15 NOTE — TELEPHONE ENCOUNTER
Caller: [facility] [address] - [phone]  - [phone]    Relationship: Pharmacy    Best call back number: [phone]    Medication needed:   Requested Prescriptions     Pending Prescriptions Disp Refills   • losartan (COZAAR) 100 MG tablet        Sig: Take 1 tablet by mouth.       When do you need the refill by: Thursday   What details did the patient provide when requesting the medication: PHARMACY HAD TO LOAN 3 YESTERDAY     Does the patient have less than a 3 day supply:  [x] Yes  [] No    What is the patient's preferred pharmacy:  [facility] [address] - [phone]

## 2020-12-18 ENCOUNTER — HOSPITAL ENCOUNTER (OUTPATIENT)
Dept: GENERAL RADIOLOGY | Facility: HOSPITAL | Age: 75
Discharge: HOME | End: 2020-12-18
Admitting: ORTHOPAEDIC SURGERY

## 2020-12-18 PROCEDURE — 77002 NEEDLE LOCALIZATION BY XRAY: CPT

## 2020-12-18 PROCEDURE — 77002 NEEDLE LOCALIZATION BY XRAY: CPT | Performed by: ORTHOPAEDIC SURGERY

## 2020-12-18 PROCEDURE — 25010000002 METHYLPREDNISOLONE PER 80 MG

## 2020-12-18 PROCEDURE — 20610 DRAIN/INJ JOINT/BURSA W/O US: CPT | Performed by: ORTHOPAEDIC SURGERY

## 2020-12-18 RX ORDER — METHYLPREDNISOLONE ACETATE 80 MG/ML
INJECTION, SUSPENSION INTRA-ARTICULAR; INTRALESIONAL; INTRAMUSCULAR; SOFT TISSUE
Status: COMPLETED
Start: 2020-12-18 | End: 2020-12-18

## 2020-12-18 RX ORDER — LIDOCAINE HYDROCHLORIDE 10 MG/ML
INJECTION, SOLUTION EPIDURAL; INFILTRATION; INTRACAUDAL; PERINEURAL
Status: COMPLETED
Start: 2020-12-18 | End: 2020-12-18

## 2020-12-18 RX ADMIN — LIDOCAINE HYDROCHLORIDE 5 ML: 10 INJECTION, SOLUTION EPIDURAL; INFILTRATION; INTRACAUDAL; PERINEURAL at 15:04

## 2020-12-18 RX ADMIN — METHYLPREDNISOLONE ACETATE 80 MG: 80 INJECTION, SUSPENSION INTRA-ARTICULAR; INTRALESIONAL; INTRAMUSCULAR; SOFT TISSUE at 15:05

## 2020-12-18 NOTE — POST-PROCEDURE NOTE
[facility]  [address], [address]  [place] [address]  [phone]        PROCEDURE REPORT        DIAGNOSIS:  Right hip osteoarthritis, symptomatic    PROCEDURE: Right  hip injection under flouroscopy      Bryan Roman with date of birth 1945 presents to [facility] Radiology Department today for injection therapy.        Patient presents to [facility] Radiology Department Flouroscopy Suite on 12/18/2020 for planned elective right hip injection under flouroscopy for symptomatic osteoarthritis.    Procedure:     After consent was obtained, and using ethyl chloride topical local anesthetic, the right hip was then prepped and draped with sterile technique. With an anterior hip approach, flouroscopy guidance, and care to stay lateral of the femoral artery, the hip joint was entered via a 20 gauge spinal needle.  A mixture of 80 mg methylprednisolone in one ml plus 5 ml of 1% plain Lidocaine was injected and the needle withdrawn. The procedure was well tolerated and without complication. The patient noted relief of focal hip joint pain.  The patient did remain stable and with baseline ambulation. The patient is asked to rest the joint for a few more days before resuming full regular activities. It may be painful for the first few days. Watch for fever, skin issues, increased swelling or persistent pain in the joint. Call or return to clinic if such symptoms occur, other concerns or if there is lack of improvement as anticipated.    Impression: Symptomatic right hip osteoarthritis.      Recommendations/Plan:      Treatment and patient advice as noted here and in office visit report.  Orthopedic activities reviewed and patient expressed appreciation.  Discussion of orthopedic goals.   Risk, benefits, and merits of treatment options reviewed and questions answered.  Call or notify for any adverse effect from injection therapy.    Exercise: As tolerated.  No strenuous  activity for a few days as appropriate.  Brace:  No brace was given at today's visit  Referral: No referrals made at today's visit  Studies: No additional studies ordered.  Surgery: No surgery proposed at this visit.  Activity:  May perform usual activities as tolerated.      Patient will return to our clinic at scheduled appointment.  Patient agreeable to call or return sooner for any concerns.

## 2021-01-07 RX ORDER — INDOMETHACIN 50 MG/1
50 CAPSULE ORAL 2 TIMES DAILY WITH MEALS
Qty: 180 CAPSULE | Refills: 3 | Status: SHIPPED | OUTPATIENT
Start: 2021-01-07 | End: 2022-02-14

## 2021-01-13 DIAGNOSIS — I10 ESSENTIAL HYPERTENSION: ICD-10-CM

## 2021-01-13 RX ORDER — AMLODIPINE BESYLATE 10 MG/1
10 TABLET ORAL DAILY
Qty: 90 TABLET | Refills: 5 | OUTPATIENT
Start: 2021-01-13

## 2021-02-19 ENCOUNTER — TELEPHONE (OUTPATIENT)
Dept: CARDIOLOGY | Facility: CLINIC | Age: 76
End: 2021-02-19

## 2021-02-23 ENCOUNTER — OFFICE VISIT (OUTPATIENT)
Dept: INTERNAL MEDICINE | Facility: CLINIC | Age: 76
End: 2021-02-23

## 2021-02-23 VITALS
OXYGEN SATURATION: 96 % | RESPIRATION RATE: 18 BRPM | HEIGHT: 73 IN | HEART RATE: 70 BPM | DIASTOLIC BLOOD PRESSURE: 70 MMHG | WEIGHT: 286 LBS | BODY MASS INDEX: 37.91 KG/M2 | TEMPERATURE: 97.7 F | SYSTOLIC BLOOD PRESSURE: 120 MMHG

## 2021-02-23 DIAGNOSIS — Z01.818 PRE-OP EVALUATION: Primary | ICD-10-CM

## 2021-02-23 DIAGNOSIS — I10 ESSENTIAL HYPERTENSION: ICD-10-CM

## 2021-02-23 DIAGNOSIS — I71.21 ASCENDING AORTIC ANEURYSM (HCC): ICD-10-CM

## 2021-02-23 DIAGNOSIS — Z79.4 TYPE 2 DIABETES MELLITUS WITH OTHER CIRCULATORY COMPLICATION, WITH LONG-TERM CURRENT USE OF INSULIN (HCC): ICD-10-CM

## 2021-02-23 DIAGNOSIS — E66.01 CLASS 2 SEVERE OBESITY DUE TO EXCESS CALORIES WITH SERIOUS COMORBIDITY AND BODY MASS INDEX (BMI) OF 37.0 TO 37.9 IN ADULT (HCC): ICD-10-CM

## 2021-02-23 DIAGNOSIS — I71.20 THORACIC AORTIC ANEURYSM WITHOUT RUPTURE (HCC): ICD-10-CM

## 2021-02-23 DIAGNOSIS — M79.606 PAIN OF LOWER EXTREMITY, UNSPECIFIED LATERALITY: ICD-10-CM

## 2021-02-23 DIAGNOSIS — M16.11 PRIMARY OSTEOARTHRITIS OF RIGHT HIP: ICD-10-CM

## 2021-02-23 DIAGNOSIS — E11.59 TYPE 2 DIABETES MELLITUS WITH OTHER CIRCULATORY COMPLICATION, WITH LONG-TERM CURRENT USE OF INSULIN (HCC): ICD-10-CM

## 2021-02-23 PROBLEM — E66.812 CLASS 2 SEVERE OBESITY DUE TO EXCESS CALORIES WITH SERIOUS COMORBIDITY AND BODY MASS INDEX (BMI) OF 37.0 TO 37.9 IN ADULT: Status: ACTIVE | Noted: 2021-02-23

## 2021-02-23 LAB
BILIRUB BLD-MCNC: ABNORMAL MG/DL
CLARITY, POC: CLEAR
COLOR UR: YELLOW
GLUCOSE UR STRIP-MCNC: NEGATIVE MG/DL
KETONES UR QL: NEGATIVE
LEUKOCYTE EST, POC: NEGATIVE
NITRITE UR-MCNC: NEGATIVE MG/ML
PH UR: 6 [PH] (ref 5–8)
POC CREATININE URINE: 300
POC MICROALBUMIN URINE: 150
PROT UR STRIP-MCNC: ABNORMAL MG/DL
RBC # UR STRIP: NEGATIVE /UL
SP GR UR: 1.03 (ref 1–1.03)
UROBILINOGEN UR QL: NORMAL

## 2021-02-23 PROCEDURE — 99213 OFFICE O/P EST LOW 20 MIN: CPT | Performed by: FAMILY MEDICINE

## 2021-02-23 PROCEDURE — 82044 UR ALBUMIN SEMIQUANTITATIVE: CPT | Performed by: FAMILY MEDICINE

## 2021-02-23 PROCEDURE — 81003 URINALYSIS AUTO W/O SCOPE: CPT | Performed by: FAMILY MEDICINE

## 2021-02-23 NOTE — PROGRESS NOTES
Subjective   Bryan Roman is a 75 y.o. male who presents to the office today for a preoperative consultation at the request of surgeon Dr. Nazario who plans on performing total hip replacement on March 22. This consultation is requested for the specific conditions prompting preoperative evaluation (i.e. because of potential affect on operative risk): diabetes mellitus. Planned anesthesia: general. The patient has the following known anesthesia issues: none. Patients bleeding risk: no recent abnormal bleeding, no remote history of abnormal bleeding and no use of Ca-channel blockers. Patient does not have objections to receiving blood products if needed.    The following portions of the patient's history were reviewed and updated as appropriate: allergies, current medications, past family history, past medical history, past social history, past surgical history and problem list.    Past Medical History:   Diagnosis Date   • Aneurysm (CMS/HCC)     aortic aneyrtysm ascending and transverse still has desending  watched by dr holloway at    • Arthritis    • Diabetes mellitus (CMS/HCC)    • Elevated cholesterol    • GERD (gastroesophageal reflux disease)    • Gout    • Heart disease    • Hyperlipidemia    • Hypertension    • Temporal arteritis (CMS/HCC)    • TIA (transient ischemic attack)     3 years ago   • Vocal cord paralysis     left during open heart surgery     Past Surgical History:   Procedure Laterality Date   • APPENDECTOMY     • ARTERIAL ANEURYSM REPAIR     • CHOLECYSTECTOMY  2001   • HERNIA REPAIR  1987   • KNEE SURGERY Right    • ORIF WRIST FRACTURE Left 6/27/2018    Procedure: OPEN REDUCTION INTERNAL FIXATION LEFT WRIST;  Surgeon: Narinder Lewis MD;  Location: [facility];  Service: Orthopedics   • THROAT SURGERY  12/30/2015     Social History     Socioeconomic History   • Marital status:      Spouse name: Not on file   • Number of children: Not on file   • Years of education: Not on file   •  Highest education level: Not on file   Tobacco Use   • Smoking status: Former Smoker     Quit date: 1967     Years since quittin.1   • Smokeless tobacco: Never Used   Substance and Sexual Activity   • Alcohol use: No   • Drug use: No   • Sexual activity: Defer   Social History Narrative    Right hand dominant     Current Outpatient Medications on File Prior to Visit   Medication Sig Dispense Refill   • amLODIPine (NORVASC) 10 MG tablet Take 1 tablet by mouth Daily. 30 tablet 5   • aspirin 81 MG EC tablet Take 81 mg by mouth Daily.     • atenolol (TENORMIN) 100 MG tablet Take 2 tablets by mouth Daily. 60 tablet 5   • Blood Glucose Monitoring Suppl device      • gabapentin (NEURONTIN) 300 MG capsule Take 1 capsule by mouth At Night As Needed (hip pain). 90 capsule 1   • glipizide (GLUCOTROL XL) 5 MG ER tablet Take 1 tablet by mouth Daily. 90 tablet 3   • Glucose Blood (BLOOD GLUCOSE TEST) strip      • indomethacin (INDOCIN) 50 MG capsule Take 1 capsule by mouth 2 (Two) Times a Day With Meals. Indications: arthritic pain 180 capsule 3   • Lancets misc      • losartan (COZAAR) 100 MG tablet Take 1 tablet by mouth Daily. Indications: High Blood Pressure Disorder, diabetes 90 tablet 3   • Red Yeast Rice 600 MG capsule Take  by mouth.     • triamcinolone (KENALOG) 0.1 % cream Apply  topically to the appropriate area as directed 2 (Two) Times a Day. 80 g 0   • TRUETEST TEST test strip 1 each by Other route as needed.     • mupirocin (BACTROBAN) 2 % ointment        No current facility-administered medications on file prior to visit.      Patient Care Team:  Génesis Vaca MD as PCP - General (Family Medicine)  Imer Guidry MD as Consulting Physician (Cardiology)  Marcial Mosley MD (Vascular Surgery)  Christiana Hernandez OD as Consulting Physician (Optometry)  Julio Cesar Haywood MD as Consulting Physician (Urology)  Bobby Nazario as Consulting Physician (Orthopedic Surgery)      Review of  "Systems  A comprehensive review of systems was negative except for: Musculoskeletal: positive for arthralgias    Objective   /70   Pulse 70   Temp 97.7 °F (36.5 °C) (Temporal)   Resp 18   Ht 185.4 cm (72.99\")   Wt 130 kg (286 lb)   SpO2 96%   BMI 37.74 kg/m²   General appearance: alert, appears stated age, cooperative, no distress and moderately obese  Head: Normocephalic, without obvious abnormality, atraumatic  Eyes: negative findings: lids and lashes normal, conjunctivae and sclerae normal and PERRL  Ears: normal hearing, normal external ears  Nose: no discharge, grossly normal  Throat: lips, mucosa, and tongue normal; teeth and gums normal  Neck: no JVD and supple, symmetrical, trachea midline  Lungs: clear to auscultation bilaterally  Heart: regular rate and rhythm, S1, S2 normal, no murmur, click, rub or gallop  Abdomen: obese  Extremities: extremities normal, atraumatic, no cyanosis or edema  Skin: Skin color, texture, turgor normal. No rashes or lesions  Neurologic: Mental status: Alert, oriented, thought content appropriate  Gait: Abnormal    Predictors of intubation difficulty:   Morbid obesity? no   Anatomically abnormal facies? no   Prominent incisors? no   Receding mandible? no   Short, thick neck? no   Neck range of motion: normal   Mallampati score: II (hard and soft palate, upper portion of tonsils anduvula visible)    Cardiographics  ECG: deferred--has been cleared by Dr. Guidry, cardiologist  Echocardiogram: not done    Imaging  Chest x-ray: not completed; he had a CTA on 11/23/2020, copy of report included with note being faxed.     Lab Review   Results will be faxed when resulted.    Office Visit on 02/23/2021   Component Date Value Ref Range Status   • Color 02/23/2021 Yellow  Yellow, Straw, Dark Yellow, Phyllis Final   • Clarity, UA 02/23/2021 Clear  Clear Final   • Specific Gravity  02/23/2021 1.030  1.005 - 1.030 Final   • pH, Urine 02/23/2021 6.0  5.0 - 8.0 Final   • Leukocytes " 02/23/2021 Negative  Negative Final   • Nitrite, UA 02/23/2021 Negative  Negative Final   • Protein, POC 02/23/2021 1+* Negative mg/dL Final   • Glucose, UA 02/23/2021 Negative  Negative, 1000 mg/dL (3+) mg/dL Final   • Ketones, UA 02/23/2021 Negative  Negative Final   • Urobilinogen, UA 02/23/2021 Normal  Normal Final   • Bilirubin 02/23/2021 Small (1+)* Negative Final   • Blood, UA 02/23/2021 Negative  Negative Final   • INR 02/23/2021 1.03  0.90 - 1.10 Final    Comment: Suggested INR therapeutic range for stable oral  anticoagulant therapy:  Low Intensity therapy:   1.5-2.0  Moderate Intensity therapy:   2.0-3.0  High Intensity therapy:   2.5-4.0     • Protime 02/23/2021 13.9  12.0 - 15.1 Seconds Final   • aPTT 02/23/2021 34.5  24.5 - 37.2 seconds Final   • WBC 02/23/2021 6.09  3.40 - 10.80 10*3/mm3 Final   • RBC 02/23/2021 5.03  4.14 - 5.80 10*6/mm3 Final   • Hemoglobin 02/23/2021 15.8  13.0 - 17.7 g/dL Final   • Hematocrit 02/23/2021 46.0  37.5 - 51.0 % Final   • MCV 02/23/2021 91.5  79.0 - 97.0 fL Final   • MCH 02/23/2021 31.4  26.6 - 33.0 pg Final   • MCHC 02/23/2021 34.3  31.5 - 35.7 g/dL Final   • RDW 02/23/2021 13.2  12.3 - 15.4 % Final   • Platelets 02/23/2021 250  140 - 450 10*3/mm3 Final   • Neutrophil Rel % 02/23/2021 75.2  42.7 - 76.0 % Final   • Lymphocyte Rel % 02/23/2021 13.5* 19.6 - 45.3 % Final   • Monocyte Rel % 02/23/2021 8.0  5.0 - 12.0 % Final   • Eosinophil Rel % 02/23/2021 2.1  0.3 - 6.2 % Final   • Basophil Rel % 02/23/2021 0.2  0.0 - 1.5 % Final   • Neutrophils Absolute 02/23/2021 4.58  1.70 - 7.00 10*3/mm3 Final   • Lymphocytes Absolute 02/23/2021 0.82  0.70 - 3.10 10*3/mm3 Final   • Monocytes Absolute 02/23/2021 0.49  0.10 - 0.90 10*3/mm3 Final   • Eosinophils Absolute 02/23/2021 0.13  0.00 - 0.40 10*3/mm3 Final   • Basophils Absolute 02/23/2021 0.01  0.00 - 0.20 10*3/mm3 Final   • Immature Granulocyte Rel % 02/23/2021 1.0* 0.0 - 0.5 % Final   • Immature Grans Absolute 02/23/2021 0.06*  0.00 - 0.05 10*3/mm3 Final   • nRBC 02/23/2021 0.0  0.0 - 0.2 /100 WBC Final   • Glucose 02/23/2021 148* 65 - 99 mg/dL Final   • BUN 02/23/2021 25* 8 - 23 mg/dL Final   • Creatinine 02/23/2021 1.33* 0.76 - 1.27 mg/dL Final   • eGFR Non  Am 02/23/2021 53* >60 mL/min/1.73 Final    Comment: The MDRD GFR formula is only valid for adults with stable  renal function between ages 18 and 70.     • eGFR  Am 02/23/2021 64  >60 mL/min/1.73 Final   • BUN/Creatinine Ratio 02/23/2021 18.8  7.0 - 25.0 Final   • Sodium 02/23/2021 140  136 - 145 mmol/L Final   • Potassium 02/23/2021 4.2  3.5 - 5.2 mmol/L Final   • Chloride 02/23/2021 104  98 - 107 mmol/L Final   • Total CO2 02/23/2021 26.9  22.0 - 29.0 mmol/L Final   • Calcium 02/23/2021 9.5  8.6 - 10.5 mg/dL Final   • Total Protein 02/23/2021 7.0  6.0 - 8.5 g/dL Final   • Albumin 02/23/2021 4.30  3.50 - 5.20 g/dL Final   • Globulin 02/23/2021 2.7  gm/dL Final   • A/G Ratio 02/23/2021 1.6  g/dL Final   • Total Bilirubin 02/23/2021 0.8  0.0 - 1.2 mg/dL Final   • Alkaline Phosphatase 02/23/2021 102  39 - 117 U/L Final   • AST (SGOT) 02/23/2021 19  1 - 40 U/L Final   • ALT (SGPT) 02/23/2021 16  1 - 41 U/L Final   • Hemoglobin A1C 02/23/2021 6.30* 4.80 - 5.60 % Final    Comment: Hemoglobin A1C Ranges:  Increased Risk for Diabetes  5.7% to 6.4%  Diabetes                     >= 6.5%  Diabetic Goal                < 7.0%     • Prealbumin 02/23/2021 28  9 - 32 mg/dL Final   • Vitamin B-12 02/23/2021 209* 211 - 946 pg/mL Final    Results may be falsely increased if patient taking Biotin.   • Folate 02/23/2021 8.44  4.78 - 24.20 ng/mL Final    Results may be falsely increased if patient taking Biotin.   • Microalbumin, Urine 02/23/2021 150   Final   • Creatinine, Urine 02/23/2021 300   Final         Assessment/Plan   75 y.o. male with planned surgery as above.    Known risk factors for perioperative complications: Diabetes mellitus    Difficulty with intubation is not  anticipated.    Cardiac Risk Estimation: low    Current medications which may produce withdrawal symptoms if withheld perioperatively: none    1. Preoperative workup as follows hemoglobin, hematocrit, electrolytes, creatinine, glucose, liver function studies, coagulation studies.  2. Change in medication regimen before surgery: discontinue NSAIDs (indomethacin) 14 days before surgery.  3. Prophylaxis for cardiac events with perioperative beta-blockers: should be considered, specific regimen per anesthesia.  4. Invasive hemodynamic monitoring perioperatively: at the discretion of anesthesiologist.  5. Deep vein thrombosis prophylaxis postoperatively:regimen to be chosen by surgical team.  6. Surveillance for postoperative MI with ECG immediately postoperatively and on postoperative days 1 and 2 AND troponin levels 24 hours postoperatively and on day 4 or hospital discharge (whichever comes first): at the discretion of anesthesiologist.  7. Other measures: none    Diagnoses and all orders for this visit:    1. Pre-op evaluation (Primary)  -     POC Urinalysis Dipstick, Automated  -     PT-INR  -     PTT  -     CBC & Differential  -     Comprehensive Metabolic Panel  -     Hemoglobin A1c  -     Prealbumin  -     Vitamin B12 & Folate    2. Primary osteoarthritis of right hip  Comments:  see orthopedics as scheduled for planned surgical procedure    3. Type 2 diabetes mellitus with other circulatory complication, with long-term current use of insulin (CMS/HCC)  -     PT-INR  -     PTT  -     Comprehensive Metabolic Panel  -     Hemoglobin A1c  -     Vitamin B12 & Folate  -     POC Microalbumin    4. Ascending aortic aneurysm (CMS/HCC)  Comments:  stable, follow up with [facility] vascular team    5. Thoracic aortic aneurysm without rupture (CMS/HCC)  Comments:  stable, follow up with [facility] vascular team    6. Essential hypertension  Comments:  stable, continue current medicines, monitor bp with surgery    7. Pain of lower  extremity, unspecified laterality   -     PT-INR  -     PTT    8. Class 2 severe obesity due to excess calories with serious comorbidity and body mass index (BMI) of 37.0 to 37.9 in adult (CMS/[facility])  Comments:  with improved mobility we are hopeful he'll be able to shed some of his weight which would help with dm, htn, etc.    Patient's Body mass index is 37.74 kg/m². BMI is above normal parameters. Recommendations include: exercise counseling.

## 2021-02-23 NOTE — PATIENT INSTRUCTIONS
Preparing for Hip Replacement  Getting prepared before hip replacement surgery can make your recovery easier and more comfortable. This document provides some tips and guidelines that will help you prepare for your surgery.  Talk with your health care provider so you can learn what to expect before, during, and after surgery. Ask questions if you do not understand something.  To ease concerns about your financial responsibilities, call your insurance company as soon as you decide to have surgery. Ask how much of your surgery and hospital stay will be covered. Also ask about coverage for medical equipment, rehabilitation facilities, and home care.  How should I arrange for help?  In the first couple weeks after surgery, it will likely be harder for you to do some of your regular activities. You may get tired easily, and you may have limited movement in your leg. Follow these guidelines to make sure you have all the help you need after your surgery:  · Plan to have someone take you home from the hospital. Your health care provider will tell you how many days you can expect to be in the hospital.  · Cancel all your work, caregiving, and volunteer responsibilities for at least 4-6 weeks after surgery.  · Plan to have someone stay with you day and night for the first week. This person should be someone you are comfortable with. You may need this person to help you with your exercises and personal care, such as bathing and using the toilet.  · If you live alone, arrange for someone to take care of your home and pets for the first 4-6 weeks after surgery.  · Arrange for drivers to take you to and from follow-up appointments, the grocery store, and other places you may need to go for at least 4-6 weeks.  · Consider applying for a disabled parking permit. To get an application, contact the Department of Motor Vehicles or your health care provider's office.  How should I prepare my home?         · Pick a recovery spot, but do  not plan on recovering in bed. Sitting more upright is better for your health. You may want to use a recliner with a small table nearby. Choose a chair with a firm seat that will not allow you to sink down into it. Chairs and sofas that are too soft can allow your hip to bend at an angle greater than 90 degrees. This could put you at risk for dislocating your new hip joint.  · Place the items you use most frequently on the small table next to your chair. These items may include the TV remote, a cordless phone, a cell phone, a book or laptop computer, and a water glass.  · To see if you will be able to move around in your home with a walker, hold your hands out about 6 inches (15 cm) from your sides, and walk from your recovery spot to your kitchen and bathroom. Then walk from your bed to the bathroom. If you do not hit anything with your hands, you will have enough room for a walker.  · Minimize the use of stairs after you return home to reduce your risk of falling or tripping.  · Remove all clutter from your floors. Also remove any throw rugs. This will help you avoid tripping after your surgery.  · Move the items you use most often in your kitchen, bathroom, and bedroom to shelves and drawers that are at countertop height.  · Prepare a few meals to freeze and reheat later.  · Consider getting safety equipment that will be helpful during your recovery, such as:  ? Grab bars added in the shower and near the toilet.  ? A raised toilet seat to help you get on and off the toilet more easily.  ? A tub or shower bench.  How should I prepare my body?  · Have a preoperative exam.  ? During the exam, your health care provider will make sure that your body is healthy enough to safely have the surgery.  ? When you go to the exam, bring a complete list of all your medicines and supplements, including herbs and vitamins.  ? You may need to have additional tests to ensure your safety.  · Have elective dental care and routine  cleanings done before your surgery. Germs from anywhere in your body, including your mouth, can travel to your new joint and infect it. It is important that you do not have any dental work done for at least 3 months after your surgery.  · Maintain a healthy diet. Do not change your diet before surgery unless your health care provider tells you to do that.  · Do not use any products that contain nicotine or tobacco, such as cigarettes and e-cigarettes. These can delay bone healing after surgery. If you need help quitting, ask your health care provider.  · Tell your health care provider if:  ? You develop any skin infections or skin irritations. You may need to improve the condition of your skin before surgery.  ? You have a fever, a cold, or any other illness in the week before your surgery.  · Do not drink any alcohol for at least 48 hours before surgery.  · The day before your surgery, follow instructions from your health care provider about showering, eating, drinking, and taking medicines. These directions are for your safety.  · Talk to your health care provider about doing exercises before your surgery.  ? Be sure to follow the exercise program only as directed by your health care provider.  ? Doing these exercises in the weeks before your surgery may help reduce pain and improve function after surgery.  Summary  · Getting prepared before hip replacement surgery can make your recovery easier and more comfortable.  · Prepare your home and arrange for help at home.  · Keep all of your preoperative appointments to ensure that you are ready for your surgery.  · Plan to have someone take you home from the hospital and stay with you day and night for the first week.  This information is not intended to replace advice given to you by your health care provider. Make sure you discuss any questions you have with your health care provider.  Document Revised: 10/06/2019 Document Reviewed: 02/04/2019  [employer] Patient  Education © 2020 [employer]

## 2021-02-24 LAB
ALBUMIN SERPL-MCNC: 4.3 G/DL (ref 3.5–5.2)
ALBUMIN/GLOB SERPL: 1.6 G/DL
ALP SERPL-CCNC: 102 U/L (ref 39–117)
ALT SERPL-CCNC: 16 U/L (ref 1–41)
APTT PPP: 34.5 SECONDS (ref 24.5–37.2)
AST SERPL-CCNC: 19 U/L (ref 1–40)
BASOPHILS # BLD AUTO: 0.01 10*3/MM3 (ref 0–0.2)
BASOPHILS NFR BLD AUTO: 0.2 % (ref 0–1.5)
BILIRUB SERPL-MCNC: 0.8 MG/DL (ref 0–1.2)
BUN SERPL-MCNC: 25 MG/DL (ref 8–23)
BUN/CREAT SERPL: 18.8 (ref 7–25)
CALCIUM SERPL-MCNC: 9.5 MG/DL (ref 8.6–10.5)
CHLORIDE SERPL-SCNC: 104 MMOL/L (ref 98–107)
CO2 SERPL-SCNC: 26.9 MMOL/L (ref 22–29)
CREAT SERPL-MCNC: 1.33 MG/DL (ref 0.76–1.27)
EOSINOPHIL # BLD AUTO: 0.13 10*3/MM3 (ref 0–0.4)
EOSINOPHIL NFR BLD AUTO: 2.1 % (ref 0.3–6.2)
ERYTHROCYTE [DISTWIDTH] IN BLOOD BY AUTOMATED COUNT: 13.2 % (ref 12.3–15.4)
FOLATE SERPL-MCNC: 8.44 NG/ML (ref 4.78–24.2)
GLOBULIN SER CALC-MCNC: 2.7 GM/DL
GLUCOSE SERPL-MCNC: 148 MG/DL (ref 65–99)
HBA1C MFR BLD: 6.3 % (ref 4.8–5.6)
HCT VFR BLD AUTO: 46 % (ref 37.5–51)
HGB BLD-MCNC: 15.8 G/DL (ref 13–17.7)
IMM GRANULOCYTES # BLD AUTO: 0.06 10*3/MM3 (ref 0–0.05)
IMM GRANULOCYTES NFR BLD AUTO: 1 % (ref 0–0.5)
INR PPP: 1.03 (ref 0.9–1.1)
LYMPHOCYTES # BLD AUTO: 0.82 10*3/MM3 (ref 0.7–3.1)
LYMPHOCYTES NFR BLD AUTO: 13.5 % (ref 19.6–45.3)
MCH RBC QN AUTO: 31.4 PG (ref 26.6–33)
MCHC RBC AUTO-ENTMCNC: 34.3 G/DL (ref 31.5–35.7)
MCV RBC AUTO: 91.5 FL (ref 79–97)
MONOCYTES # BLD AUTO: 0.49 10*3/MM3 (ref 0.1–0.9)
MONOCYTES NFR BLD AUTO: 8 % (ref 5–12)
NEUTROPHILS # BLD AUTO: 4.58 10*3/MM3 (ref 1.7–7)
NEUTROPHILS NFR BLD AUTO: 75.2 % (ref 42.7–76)
NRBC BLD AUTO-RTO: 0 /100 WBC (ref 0–0.2)
PLATELET # BLD AUTO: 250 10*3/MM3 (ref 140–450)
POTASSIUM SERPL-SCNC: 4.2 MMOL/L (ref 3.5–5.2)
PREALB SERPL-MCNC: 28 MG/DL (ref 9–32)
PROT SERPL-MCNC: 7 G/DL (ref 6–8.5)
PROTHROMBIN TIME: 13.9 SECONDS (ref 12–15.1)
RBC # BLD AUTO: 5.03 10*6/MM3 (ref 4.14–5.8)
SODIUM SERPL-SCNC: 140 MMOL/L (ref 136–145)
VIT B12 SERPL-MCNC: 209 PG/ML (ref 211–946)
WBC # BLD AUTO: 6.09 10*3/MM3 (ref 3.4–10.8)

## 2021-02-24 RX ORDER — HYDROCHLOROTHIAZIDE 25 MG/1
25 TABLET ORAL DAILY
Qty: 90 TABLET | Refills: 3 | Status: SHIPPED | OUTPATIENT
Start: 2021-02-24 | End: 2021-12-07

## 2021-02-24 NOTE — TELEPHONE ENCOUNTER
Caller: [facility] [address] [facility] - [phone] [facility] [phone] FX    Relationship: Pharmacy    Best call back number: [phone]  Medication needed:   Requested Prescriptions     Pending Prescriptions Disp Refills   • hydroCHLOROthiazide (HYDRODIURIL) 25 MG tablet        Sig: Take 1 tablet by mouth Daily.       What details did the patient provide when requesting the medication: PATIENT HAS 3 TABLETS LEFT OF MEDICATION     Does the patient have less than a 3 day supply:  [x] Yes  [] No    What is the patient's preferred pharmacy: [place], [place] - [address] [facility] - [phone] [facility] [phone] FX

## 2021-03-04 ENCOUNTER — TELEPHONE (OUTPATIENT)
Dept: INTERNAL MEDICINE | Facility: CLINIC | Age: 76
End: 2021-03-04

## 2021-03-04 NOTE — TELEPHONE ENCOUNTER
Called patient to relay lab results and instructions from unread [employer] message.   Relayed results and instructions. Mailed it patient also so he would get all the information concerning B12.  Pt relayed his labs had not been sent to [facility]. I refaxed labs to [facility].       Hoping that great grandson is here and doing well.     Labs back, I've printed to send to orthopedics. Overall, not bad. Good diabetes mellitus control (remember goal A1C 7). Vitamin B12 is low, though. Please start taking vitamin B12 oral over the counter daily asap. Next labs I'll recheck vitamin B12, if still low despite the oral supplement that means you'll need vitamin B12 shots. Vitamin B12 deficiency can lead to a lot of problems including fatigue, neuropathy, memory loss, etc. Any dose over the counter is fine.       Dr. Vaca       Vitamin B12 Deficiency   Vitamin B12 deficiency occurs when the body does not have enough vitamin B12, which is an important vitamin. The body needs this vitamin:   To make red blood cells.   To make DNA. This is the genetic material inside cells.   To help the nerves work properly so they can carry messages from the brain to the body.   Vitamin B12 deficiency can cause various health problems, such as a low red blood cell count (anemia) or nerve damage.   What are the causes?   This condition may be caused by:   Not eating enough foods that contain vitamin B12.   Not having enough stomach acid and digestive fluids to properly absorb vitamin B12 from the food that you eat.   Certain digestive system diseases that make it hard to absorb vitamin B12. These diseases include Crohn's disease, chronic pancreatitis, and cystic fibrosis.   A condition in which the body does not make enough of a protein (intrinsic factor), resulting in too few red blood cells (pernicious anemia).   Having a surgery in which part of the stomach or small intestine is removed.   Taking certain medicines that make it hard for the  body to absorb vitamin B12. These medicines include:   Heartburn medicines (antacids and proton pump inhibitors).   Certain antibiotic medicines.   Some medicines that are used to treat diabetes, tuberculosis, gout, or high cholesterol.   What increases the risk?   The following factors may make you more likely to develop a B12 deficiency:   Being older than age 50.   Eating a vegetarian or vegan diet, especially while you are pregnant.   Eating a poor diet while you are pregnant.   Taking certain medicines.   Having alcoholism.   What are the signs or symptoms?   In some cases, there are no symptoms of this condition. If the condition leads to anemia or nerve damage, various symptoms can occur, such as:   Weakness.   Fatigue.   Loss of appetite.   Weight loss.   Numbness or tingling in your hands and feet.   Redness and burning of the tongue.   Confusion or memory problems.   Depression.   Sensory problems, such as color blindness, ringing in the ears, or loss of taste.   Diarrhea or constipation.   Trouble walking.   If anemia is severe, symptoms can include:   Shortness of breath.   Dizziness.   Rapid heart rate (tachycardia).   How is this diagnosed?   This condition may be diagnosed with a blood test to measure the level of vitamin B12 in your blood. You may also have other tests, including:   A group of tests that measure certain characteristics of blood cells (complete blood count, CBC).   A blood test to measure intrinsic factor.   A procedure where a thin tube with a camera on the end is used to look into your stomach or intestines (endoscopy).   Other tests may be needed to discover the cause of B12 deficiency.   How is this treated?   Treatment for this condition depends on the cause. This condition may be treated by:   Changing your eating and drinking habits, such as:   Eating more foods that contain vitamin B12.   Drinking less alcohol or no alcohol.   Getting vitamin B12 injections.   Taking vitamin  B12 supplements. Your health care provider will tell you which dosage is best for you.   Follow these instructions at home:   Eating and drinking     Eat lots of healthy foods that contain vitamin B12, including:   Meats and poultry. This includes beef, pork, chicken, turkey, and organ meats, such as liver.   Seafood. This includes clams, rainbow trout, salmon, tuna, and danielle.   Eggs.   Cereal and dairy products that are fortified. This means that vitamin B12 has been added to the food. Check the label on the package to see if the food is fortified.   The items listed above may not be a complete list of recommended foods and beverages. Contact a dietitian for more information.   General instructions   Get any injections that are prescribed by your health care provider.   Take supplements only as told by your health care provider. Follow the directions carefully.   Do not drink alcohol if your health care provider tells you not to. In some cases, you may only be asked to limit alcohol use.   Keep all follow-up visits as told by your health care provider. This is important.   Contact a health care provider if:   Your symptoms come back.   Get help right away if you:   Develop shortness of breath.   Have a rapid heart rate.   Have chest pain.   Become dizzy or lose consciousness.   Summary   Vitamin B12 deficiency occurs when the body does not have enough vitamin B12.   The main causes of vitamin B12 deficiency include dietary deficiency, digestive diseases, pernicious anemia, and having a surgery in which part of the stomach or small intestine is removed.   In some cases, there are no symptoms of this condition. If the condition leads to anemia or nerve damage, various symptoms can occur, such as weakness, shortness of breath, and numbness.   Treatment may include getting vitamin B12 injections or taking vitamin B12 supplements. Eat lots of healthy foods that contain vitamin B12.   This information is not intended  to replace advice given to you by your health care provider. Make sure you discuss any questions you have with your health care provider.

## 2021-03-29 RX ORDER — ATENOLOL 100 MG/1
200 TABLET ORAL DAILY
Qty: 60 TABLET | Refills: 5 | OUTPATIENT
Start: 2021-03-29

## 2021-03-31 RX ORDER — ATENOLOL 100 MG/1
200 TABLET ORAL DAILY
Qty: 180 TABLET | Refills: 3 | Status: SHIPPED | OUTPATIENT
Start: 2021-03-31 | End: 2022-01-03

## 2021-04-08 ENCOUNTER — OFFICE VISIT (OUTPATIENT)
Dept: CARDIOLOGY | Facility: CLINIC | Age: 76
End: 2021-04-08

## 2021-04-08 VITALS
BODY MASS INDEX: 36.45 KG/M2 | DIASTOLIC BLOOD PRESSURE: 78 MMHG | RESPIRATION RATE: 18 BRPM | SYSTOLIC BLOOD PRESSURE: 118 MMHG | HEIGHT: 73 IN | HEART RATE: 78 BPM | WEIGHT: 275 LBS | OXYGEN SATURATION: 98 %

## 2021-04-08 DIAGNOSIS — I71.40 ABDOMINAL AORTIC ANEURYSM (AAA) WITHOUT RUPTURE (HCC): ICD-10-CM

## 2021-04-08 DIAGNOSIS — I10 ESSENTIAL HYPERTENSION: Primary | ICD-10-CM

## 2021-04-08 DIAGNOSIS — E11.51 TYPE 2 DIABETES MELLITUS WITH DIABETIC PERIPHERAL ANGIOPATHY WITHOUT GANGRENE, WITH LONG-TERM CURRENT USE OF INSULIN (HCC): ICD-10-CM

## 2021-04-08 DIAGNOSIS — Z78.9 STATIN INTOLERANCE: ICD-10-CM

## 2021-04-08 DIAGNOSIS — Z79.4 TYPE 2 DIABETES MELLITUS WITH DIABETIC PERIPHERAL ANGIOPATHY WITHOUT GANGRENE, WITH LONG-TERM CURRENT USE OF INSULIN (HCC): ICD-10-CM

## 2021-04-08 DIAGNOSIS — E78.5 HYPERLIPIDEMIA LDL GOAL <100: ICD-10-CM

## 2021-04-08 PROBLEM — M10.9 GOUT: Status: ACTIVE | Noted: 2021-04-08

## 2021-04-08 PROBLEM — Z97.3 WEARS EYEGLASSES: Status: ACTIVE | Noted: 2021-04-08

## 2021-04-08 PROBLEM — I72.9 ANEURYSM (HCC): Status: ACTIVE | Noted: 2021-04-08

## 2021-04-08 PROBLEM — I71.9 AORTIC ANEURYSM: Status: ACTIVE | Noted: 2021-04-08

## 2021-04-08 PROBLEM — G47.30 SLEEP APNEA: Status: ACTIVE | Noted: 2021-04-08

## 2021-04-08 PROBLEM — M31.6 TEMPORAL ARTERITIS: Status: ACTIVE | Noted: 2021-04-08

## 2021-04-08 PROBLEM — R35.1 NOCTURIA: Status: ACTIVE | Noted: 2021-04-08

## 2021-04-08 PROCEDURE — 99213 OFFICE O/P EST LOW 20 MIN: CPT | Performed by: INTERNAL MEDICINE

## 2021-04-08 NOTE — PROGRESS NOTES
[facility] Cardiology Office Follow Up Note    Bryan Roman  [id number]  2021    Primary Care Provider: Génesis Vaca MD    Chief Complaint: Regular follow-up    History of Present Illness:   Mr. Bryan Roman is a 75 y.o. male who presents to the Cardiology Clinic for regular follow-up. The patient has a past medical history significant for type 2 diabetes mellitus, hypercholesterolemia, GERD, gout, and obesity.  He has a past vascular history significant for ascending aortic aneurysm status post repair in 9/15.  He is also known to have an abdominal aortic aneurysm, which is followed by vascular surgery.   He presents today for regular follow-up.  Since his last appointment, the patient reports he has been well without any significant changes in his health.  He did undergo right hip replacement, which has significantly improved his symptoms and ambulation.  He continues to monitor his blood pressure, which has remained well controlled with his current antihypertensives.  He denies any exertional chest pain or anginal symptoms.  He continues to follow with vascular surgery regarding his aortic aneurysm.  Denies any significant lower extremity edema, orthopnea, or PND.  No palpitations.  No specific complaints at this time.    Past Cardiac Testin. Last Coronary Angio: None  2. Prior Stress Testing: GXT 9/15   1.  No evidence of inducible ischemia  3. Last Echo:    1.  Normal left ventricular systolic function   2.  Aortic dilatation at 5.1 cm   3.  Mild AI and trivial MR  4. Prior Holter Monitor: None    Review of Systems:   Review of Systems   Constitutional: Negative for activity change, appetite change, chills, diaphoresis, fatigue, fever, unexpected weight gain and unexpected weight loss.   Eyes: Negative for blurred vision and double vision.   Respiratory: Negative for cough, chest tightness, shortness of breath and wheezing.    Cardiovascular: Negative  for chest pain, palpitations and leg swelling.   Gastrointestinal: Negative for abdominal pain, anal bleeding, blood in stool and GERD.   Endocrine: Negative for cold intolerance and heat intolerance.   Genitourinary: Negative for hematuria.   Neurological: Negative for dizziness, syncope, weakness and light-headedness.   Hematological: Does not bruise/bleed easily.   Psychiatric/Behavioral: Negative for depressed mood and stress. The patient is not nervous/anxious.        I have reviewed and/or updated the patient's past medical, past surgical, family, social history, problem list and allergies as appropriate.     Medications:     Current Outpatient Medications:   •  amLODIPine (NORVASC) 10 MG tablet, Take 1 tablet by mouth Daily., Disp: 30 tablet, Rfl: 5  •  aspirin 81 MG EC tablet, Take 81 mg by mouth 2 (Two) Times a Day. Has been taking twice daily since hip replace surgery on 3/8/21, Disp: , Rfl:   •  atenolol (TENORMIN) 100 MG tablet, Take 2 tablets by mouth Daily., Disp: 180 tablet, Rfl: 3  •  Blood Glucose Monitoring Suppl device, , Disp: , Rfl:   •  glipizide (GLUCOTROL XL) 5 MG ER tablet, Take 1 tablet by mouth Daily., Disp: 90 tablet, Rfl: 3  •  Glucose Blood (BLOOD GLUCOSE TEST) strip, , Disp: , Rfl:   •  hydroCHLOROthiazide (HYDRODIURIL) 25 MG tablet, Take 1 tablet by mouth Daily. Indications: High Blood Pressure Disorder, Disp: 90 tablet, Rfl: 3  •  indomethacin (INDOCIN) 50 MG capsule, Take 1 capsule by mouth 2 (Two) Times a Day With Meals. Indications: arthritic pain (Patient taking differently: Take 50 mg by mouth 2 (Two) Times a Day As Needed. Indications: arthritic pain), Disp: 180 capsule, Rfl: 3  •  Lancets misc, , Disp: , Rfl:   •  losartan (COZAAR) 100 MG tablet, Take 1 tablet by mouth Daily. Indications: High Blood Pressure Disorder, diabetes, Disp: 90 tablet, Rfl: 3  •  mupirocin (BACTROBAN) 2 % ointment, , Disp: , Rfl:   •  Red Yeast Rice 600 MG capsule, Take  by mouth., Disp: , Rfl:   •   "triamcinolone (KENALOG) 0.1 % cream, Apply  topically to the appropriate area as directed 2 (Two) Times a Day., Disp: 80 g, Rfl: 0  •  TRUETEST TEST test strip, 1 each by Other route as needed., Disp: , Rfl:     Physical Exam:  Vital Signs:   Vitals:    04/08/21 1046   BP: 118/78   Pulse: 78   Resp: 18   SpO2: 98%   Weight: 125 kg (275 lb)   Height: 185.4 cm (73\")       Physical Exam  Constitutional:       General: He is not in acute distress.     Appearance: He is well-developed. He is obese. He is not diaphoretic.   HENT:      Head: Normocephalic and atraumatic.   Eyes:      General: No scleral icterus.     Pupils: Pupils are equal, round, and reactive to light.   Neck:      Trachea: No tracheal deviation.   Cardiovascular:      Rate and Rhythm: Normal rate and regular rhythm.      Heart sounds: Normal heart sounds. No murmur heard.   No friction rub. No gallop.       Comments: Normal JVD.  Pulmonary:      Effort: Pulmonary effort is normal. No respiratory distress.      Breath sounds: Normal breath sounds. No stridor. No wheezing or rales.   Chest:      Chest wall: No tenderness.   Abdominal:      General: Bowel sounds are normal. There is no distension.      Palpations: Abdomen is soft.      Tenderness: There is no abdominal tenderness. There is no guarding or rebound.   Musculoskeletal:         General: No swelling. Normal range of motion.      Cervical back: Neck supple. No tenderness.   Lymphadenopathy:      Cervical: No cervical adenopathy.   Skin:     General: Skin is warm and dry.      Findings: No erythema.   Neurological:      General: No focal deficit present.      Mental Status: He is alert and oriented to person, place, and time.   Psychiatric:         Mood and Affect: Mood normal.         Behavior: Behavior normal.         Results Review:   I reviewed the patient's new clinical results.        Assessment / Plan:     1.  Essential hypertension  --Remains well controlled with current " antihypertensives  --Continue atenolol, Norvasc, HCTZ, and losartan  --Follow-up in 1 year, or sooner if required     2.  Hyperlipidemia LDL goal <100  --Lipid profile in 11/20 with triglycerides 450, LDL 81, HDL 26  --History of statin intolerance  --Discuss fibrate therapy at next follow-up     3.  AAA (abdominal aortic aneurysm)   --Continues to follow on a regular basis with vascular surgery at      4.  Type 2 diabetes mellitus   --Management per PCP  --Last hemoglobin A1c 6.3%    Follow Up:   Return in about 1 year (around 4/8/2022).      Thank you for allowing me to participate in the care of your patient. Please to not hesitate to contact me with additional questions or concerns.     JAMIE Guidry MD  Interventional Cardiology   04/08/2021  10:56 EDT

## 2021-04-12 DIAGNOSIS — I10 ESSENTIAL HYPERTENSION: ICD-10-CM

## 2021-04-12 RX ORDER — AMLODIPINE BESYLATE 10 MG/1
10 TABLET ORAL DAILY
Qty: 90 TABLET | Refills: 3 | Status: SHIPPED | OUTPATIENT
Start: 2021-04-12 | End: 2021-11-16

## 2021-05-03 RX ORDER — GLIPIZIDE 5 MG/1
5 TABLET, FILM COATED, EXTENDED RELEASE ORAL DAILY
Qty: 90 TABLET | Refills: 3 | Status: SHIPPED | OUTPATIENT
Start: 2021-05-03 | End: 2022-05-06

## 2021-06-01 ENCOUNTER — OFFICE VISIT (OUTPATIENT)
Dept: INTERNAL MEDICINE | Facility: CLINIC | Age: 76
End: 2021-06-01

## 2021-06-01 VITALS
TEMPERATURE: 97.3 F | RESPIRATION RATE: 18 BRPM | OXYGEN SATURATION: 99 % | WEIGHT: 280.13 LBS | DIASTOLIC BLOOD PRESSURE: 81 MMHG | SYSTOLIC BLOOD PRESSURE: 145 MMHG | HEART RATE: 59 BPM | BODY MASS INDEX: 37.12 KG/M2 | HEIGHT: 73 IN

## 2021-06-01 DIAGNOSIS — I10 ESSENTIAL HYPERTENSION: ICD-10-CM

## 2021-06-01 DIAGNOSIS — Z79.4 TYPE 2 DIABETES MELLITUS WITH OTHER CIRCULATORY COMPLICATION, WITH LONG-TERM CURRENT USE OF INSULIN (HCC): Primary | ICD-10-CM

## 2021-06-01 DIAGNOSIS — E11.59 TYPE 2 DIABETES MELLITUS WITH OTHER CIRCULATORY COMPLICATION, WITH LONG-TERM CURRENT USE OF INSULIN (HCC): Primary | ICD-10-CM

## 2021-06-01 LAB — HBA1C MFR BLD: 6.2 %

## 2021-06-01 PROCEDURE — 99213 OFFICE O/P EST LOW 20 MIN: CPT | Performed by: FAMILY MEDICINE

## 2021-06-01 PROCEDURE — 3044F HG A1C LEVEL LT 7.0%: CPT | Performed by: FAMILY MEDICINE

## 2021-06-01 PROCEDURE — 83036 HEMOGLOBIN GLYCOSYLATED A1C: CPT | Performed by: FAMILY MEDICINE

## 2021-06-01 RX ORDER — LOSARTAN POTASSIUM 100 MG/1
100 TABLET ORAL DAILY
Qty: 90 TABLET | Refills: 3 | Status: SHIPPED | OUTPATIENT
Start: 2021-06-01 | End: 2022-03-11

## 2021-06-01 NOTE — PROGRESS NOTES
"Subjective    Bryan Roman is a 76 y.o. male here for:  Chief Complaint   Patient presents with   • Diabetes       History per MA reviewed.    Did very well from hip surgery  Was able to go up steps immediately after surgery  Only had to do 3 weeks of PT afterwards!   Feeling better  Has stress at home with wife's condition, pending [facility] evaluation         The following portions of the patient's history were reviewed and updated as appropriate: allergies, current medications, past family history, past medical history, past social history, past surgical history and problem list.    Review of Systems   Constitutional: Negative for fever.   Psychiatric/Behavioral: Positive for stress.       Visit Vitals  /81   Pulse 59   Temp 97.3 °F (36.3 °C) (Temporal)   Resp 18   Ht 185.4 cm (72.99\")   Wt 127 kg (280 lb 2 oz)   SpO2 99%   BMI 36.97 kg/m²         Objective   Physical Exam  Vitals and nursing note reviewed.   Constitutional:       General: He is not in acute distress.     Appearance: Normal appearance. He is well-developed and well-groomed. He is obese. He is not ill-appearing, toxic-appearing or diaphoretic.      Interventions: Face mask in place.   HENT:      Head: Normocephalic and atraumatic.      Right Ear: Hearing normal.      Left Ear: Hearing normal.   Eyes:      General: Lids are normal. No scleral icterus.     Extraocular Movements: Extraocular movements intact.   Pulmonary:      Effort: Pulmonary effort is normal.   Musculoskeletal:      Cervical back: Neck supple.   Skin:     Coloration: Skin is not jaundiced or pale.   Neurological:      General: No focal deficit present.      Mental Status: He is alert and oriented to person, place, and time.   Psychiatric:         Attention and Perception: Attention and perception normal.         Mood and Affect: Mood and affect normal.         Speech: Speech normal.         Behavior: Behavior normal. Behavior is cooperative.         Thought " Content: Thought content normal.         Cognition and Memory: Cognition and memory normal.         Judgment: Judgment normal.         For medical decision making review of the following was required:  Lab Results   Component Value Date    HGBA1C 6.2 06/01/2021    HGBA1C 6.30 (H) 02/23/2021    HGBA1C 6.1 11/10/2020         Assessment/Plan     Problem List Items Addressed This Visit        Cardiac and Vasculature    Essential hypertension    Relevant Medications    losartan (COZAAR) 100 MG tablet       Endocrine and Metabolic    Type 2 diabetes mellitus with circulatory disorder, with long-term current use of insulin (CMS/[facility]) - Primary    Overview     · Associated with hypertension, hyperlipidemia          Relevant Orders    POC Glycosylated Hemoglobin (Hb A1C) (Completed)          · Blood pressure high today, not his baseline. Encouraged watching at home, needs to be under 130/80.  · Diabetes mellitus controlled, no changes on medicine    Return in about 5 months (around 11/11/2021) for Medicare Wellness (366 days from last AWV).     Gnéesis Vaca MD

## 2021-08-16 ENCOUNTER — HOSPITAL ENCOUNTER (EMERGENCY)
Facility: HOSPITAL | Age: 76
Discharge: HOME | End: 2021-08-16
Attending: HOSPITALIST
Payer: MEDICARE

## 2021-08-16 ENCOUNTER — APPOINTMENT (OUTPATIENT)
Dept: GENERAL RADIOLOGY | Facility: HOSPITAL | Age: 76
End: 2021-08-16
Payer: MEDICARE

## 2021-08-16 VITALS
WEIGHT: 265 LBS | RESPIRATION RATE: 20 BRPM | DIASTOLIC BLOOD PRESSURE: 100 MMHG | OXYGEN SATURATION: 95 % | HEART RATE: 70 BPM | SYSTOLIC BLOOD PRESSURE: 137 MMHG | TEMPERATURE: 98.5 F | BODY MASS INDEX: 35.89 KG/M2 | HEIGHT: 72 IN

## 2021-08-16 DIAGNOSIS — I48.91 ATRIAL FIBRILLATION, UNSPECIFIED TYPE (HCC): Primary | ICD-10-CM

## 2021-08-16 LAB
A/G RATIO: 1.5 (ref 0.8–2)
ALBUMIN SERPL-MCNC: 4.1 G/DL (ref 3.4–4.8)
ALP BLD-CCNC: 78 U/L (ref 25–100)
ALT SERPL-CCNC: 36 U/L (ref 4–36)
ANION GAP SERPL CALCULATED.3IONS-SCNC: 9 MMOL/L (ref 3–16)
AST SERPL-CCNC: 20 U/L (ref 8–33)
BASOPHILS ABSOLUTE: 0 K/UL (ref 0–0.1)
BASOPHILS RELATIVE PERCENT: 0.3 %
BILIRUB SERPL-MCNC: 1 MG/DL (ref 0.3–1.2)
BUN BLDV-MCNC: 29 MG/DL (ref 6–20)
CALCIUM SERPL-MCNC: 9.3 MG/DL (ref 8.5–10.5)
CHLORIDE BLD-SCNC: 105 MMOL/L (ref 98–107)
CO2: 26 MMOL/L (ref 20–30)
CREAT SERPL-MCNC: 1.2 MG/DL (ref 0.4–1.2)
EOSINOPHILS ABSOLUTE: 0.1 K/UL (ref 0–0.4)
EOSINOPHILS RELATIVE PERCENT: 1.4 %
GFR AFRICAN AMERICAN: >59
GFR NON-AFRICAN AMERICAN: 59
GLOBULIN: 2.7 G/DL
GLUCOSE BLD-MCNC: 105 MG/DL (ref 74–106)
HCT VFR BLD CALC: 41.9 % (ref 40–54)
HEMOGLOBIN: 13.7 G/DL (ref 13–18)
IMMATURE GRANULOCYTES #: 0.1 K/UL
IMMATURE GRANULOCYTES %: 0.8 % (ref 0–5)
LYMPHOCYTES ABSOLUTE: 0.9 K/UL (ref 1.5–4)
LYMPHOCYTES RELATIVE PERCENT: 13.5 %
MCH RBC QN AUTO: 30.3 PG (ref 27–32)
MCHC RBC AUTO-ENTMCNC: 32.7 G/DL (ref 31–35)
MCV RBC AUTO: 92.7 FL (ref 80–100)
MONOCYTES ABSOLUTE: 0.5 K/UL (ref 0.2–0.8)
MONOCYTES RELATIVE PERCENT: 8.1 %
NEUTROPHILS ABSOLUTE: 4.9 K/UL (ref 2–7.5)
NEUTROPHILS RELATIVE PERCENT: 75.9 %
PDW BLD-RTO: 14.3 % (ref 11–16)
PLATELET # BLD: 191 K/UL (ref 150–400)
PMV BLD AUTO: 10.4 FL (ref 6–10)
POTASSIUM SERPL-SCNC: 3.9 MMOL/L (ref 3.4–5.1)
PRO-BNP: 1698 PG/ML (ref 0–1800)
RAPID INFLUENZA  B AGN: NEGATIVE
RAPID INFLUENZA A AGN: NEGATIVE
RBC # BLD: 4.52 M/UL (ref 4.5–6)
SARS-COV-2, NAAT: NOT DETECTED
SODIUM BLD-SCNC: 140 MMOL/L (ref 136–145)
TOTAL PROTEIN: 6.8 G/DL (ref 6.4–8.3)
TROPONIN: <0.3 NG/ML
WBC # BLD: 6.4 K/UL (ref 4–11)

## 2021-08-16 PROCEDURE — 87804 INFLUENZA ASSAY W/OPTIC: CPT

## 2021-08-16 PROCEDURE — 99283 EMERGENCY DEPT VISIT LOW MDM: CPT

## 2021-08-16 PROCEDURE — 71045 X-RAY EXAM CHEST 1 VIEW: CPT

## 2021-08-16 PROCEDURE — 36415 COLL VENOUS BLD VENIPUNCTURE: CPT

## 2021-08-16 PROCEDURE — 85025 COMPLETE CBC W/AUTO DIFF WBC: CPT

## 2021-08-16 PROCEDURE — 80053 COMPREHEN METABOLIC PANEL: CPT

## 2021-08-16 PROCEDURE — 83880 ASSAY OF NATRIURETIC PEPTIDE: CPT

## 2021-08-16 PROCEDURE — 87635 SARS-COV-2 COVID-19 AMP PRB: CPT

## 2021-08-16 PROCEDURE — 84484 ASSAY OF TROPONIN QUANT: CPT

## 2021-08-16 NOTE — ED TRIAGE NOTES
Patient to ED for complaints of soa, dry mouth and sleeplessness, ongoing for 3 -4 days. Had an appointment today with his pcp but they called him and thought he should come to ED for tests that they can not do, like check his sugar. Patient took a home Covid test yesterday from [facility] and it was negative.

## 2021-08-16 NOTE — ED NOTES
AVS reviewed with patient, understanding verbalized. Patient discharged home with no further needs or concerns voiced. Patient encouraged to follow up with pcp/ cardiologist. Patient discharged home at this time.      Milton Mcknight RN  08/16/21 0635

## 2021-08-16 NOTE — ED PROVIDER NOTES
[facility]      Pt Name: Erlinda Haywood  MRN: [id number]  YOB: 1945  Date of evaluation: 8/16/2021  Provider: Elise Carrasco, [facility]       Chief Complaint   Patient presents with    Shortness of Breath    Insomnia         HISTORY OF PRESENT ILLNESS  (Location/Symptom, Timing/Onset, Context/Setting, Quality, Duration, Modifying Factors, Severity.)   Erlinda Haywood is a 77 y.o. male who presents to the emergency department for shortness of breath and difficulty sleeping. Patient never even mention insomnia to me even when I asked but it is listed as a chief complaint. However he states he has noticed has had some increase shortness of breath over the last week. Patient helps take care of his wife at home he states he helps lift pull tug on her he will also help her up and then help ambulate with her so that she can get to the bathroom. Patient states that he is noticed that even with just the ambulation to the house he has had some mild increase shortness of breath. He states anything exertional also causes him to have some mild increase shortness of breath that normally would not bother him. Patient denies any fevers or chills. He denies any cough out of ordinary. Denies any nausea vomiting or diarrhea. Patient states he feels a little weak and fatigued over the last week also. Patient has had open heart surgery and a aneurysm repairs. Patient states that he did have a history of atrial fibrillation after his open heart surgery. Patient denies any numbness tingling weakness of the extremities out of ordinary. Denies any sensation of palpitations. Denies any chest pain with a mild increase shortness of breath. Patient states that while at rest he is not short of breath at all is only with exertion. Denies any dark tarry or bright red bloody stools.   Patient does take aspirin daily but is not on any other type of antiplatelet or anticoagulation medication. Nursing notes were reviewed. REVIEW OFSYSTEMS    (2-9 systems for level 4, 10 or more for level 5)   ROS:  General:  No fevers, no chills, +weakness  Cardiovascular:  No chest pain, no palpitations  Respiratory:  +shortness of breath-with exertion, no cough, no wheezing  Gastrointestinal:  No pain, no nausea, no vomiting, no diarrhea  Musculoskeletal:  No muscle pain, no joint pain  Skin:  No rash, no easy bruising  Neurologic:  No speech problems, no headache, no extremity weakness  Psychiatric:  No anxiety  Genitourinary:  No dysuria, no hematuria    Except as noted above the remainder of the review of systems was reviewed and negative. PAST MEDICAL HISTORY     Past Medical History:   Diagnosis Date    Atrial fibrillation [facility])     Descending aortic aneurysm (HCC)     watching    [facility]     GERD (gastroesophageal reflux disease)     Gout     left foot great toe    Hyperlipidemia     Hypertension          SURGICAL HISTORY       Past Surgical History:   Procedure Laterality Date    ABDOMINAL AORTIC ANEURYSM REPAIR      APPENDECTOMY  2001    ruptured with elle Cedillo       Previous Medications    AMLODIPINE (NORVASC) 10 MG TABLET    Take 10 mg by mouth daily    ASPIRIN 81 MG EC TABLET    Take 81 mg by mouth daily    ATENOLOL (TENORMIN) 100 MG TABLET    TAKE TWO TABLETS BY MOUTH ONCE DAILY    BLOOD GLUCOSE MONITORING SUPPL SYLVIA    1 each by Does not apply route daily. GLIPIZIDE (GLUCOTROL) 5 MG TABLET    Take 5 mg by mouth daily     GLUCOSE BLOOD (BLOOD GLUCOSE TEST STRIPS) STRP    1 each by Does not apply route daily. HYDROCHLOROTHIAZIDE (HYDRODIURIL) 25 MG TABLET    TAKE ONE TABLET BY MOUTH ONCE DAILY    LANCETS MISC    1 each by Does not apply route daily.     LOSARTAN (COZAAR) 100 MG TABLET    Take 25 mg by mouth daily        ALLERGIES Ampicillin and Niacin and related    FAMILY HISTORY     History reviewed. No pertinent family history. SOCIAL HISTORY       Social History     Socioeconomic History    Marital status:      Spouse name: None    Number of children: None    Years of education: None    Highest education level: None   Occupational History    None   Tobacco Use    Smoking status: Never Smoker    Smokeless tobacco: Never Used   Substance and Sexual Activity    Alcohol use: No    Drug use: No    Sexual activity: None   Other Topics Concern    None   Social History Narrative    None     Social Determinants of Health     Financial Resource Strain:     Difficulty of Paying Living Expenses:    Food Insecurity:     Worried About Running Out of Food in the Last Year:     Ran Out of Food in the Last Year:    Transportation Needs:     Lack of Transportation (Medical):  Lack of Transportation (Non-Medical):    Physical Activity:     Days of Exercise per Week:     Minutes of Exercise per Session:    Stress:     Feeling of Stress :    Social Connections:     Frequency of Communication with Friends and Family:     Frequency of Social Gatherings with Friends and Family:     Attends Rastafarian Services:     Active Member of Clubs or Organizations:     Attends Club or Organization Meetings:     Marital Status:    Intimate Partner Violence:     Fear of Current or Ex-Partner:     Emotionally Abused:     Physically Abused:     Sexually Abused:          PHYSICAL EXAM    (up to 7 for level 4, 8 or more for level 5)     ED Triage Vitals [08/16/21 1629]   BP Temp Temp Source Pulse Resp SpO2 Height Weight   (!) 159/108 97.9 °F (36.6 °C) Oral 76 24 99 % 6' (1.829 m) 265 lb (120.2 kg)       Physical Exam  General :Patient is awake, alert, oriented, in no acute distress, nontoxic appearing  HEENT: Pupils are equally round and reactive to light, EOMI, conjunctivae clear. Oral mucosa is moist, no exudate.  Uvula is midline  Cardiac: Irregularly irregular rhythm but not tachycardic, no murmurs, rubs, or gallops  Lungs: Lungs are clear to auscultation, there is no wheezing, rhonchi, or rales. There is no use of accessory muscles. Abdomen: Abdomen is soft, nontender, nondistended. There is no firm or pulsatile masses, no rebound rigidity or guarding. Musculoskeletal: 5 out of 5 strength in all 4 extremities. No focal muscle deficits are appreciated  Neuro: Motor intact, sensory intact, level of consciousness is normal, cerebellar function is normal, reflexes are grossly normal. No evidence of incontinence or loss of bowel or bladder function, no saddle anesthesia noted   Dermatology: Skin is warm and dry  Psych: Mentation is grossly normal, cognition is grossly normal. Affect is appropriate. DIAGNOSTIC RESULTS     EKG: All EKG's are interpreted by the Emergency Department Physician who either signs or Co-signs this chart in the [address] a cardiologist.    The EKG interpreted by me shows atrial fibrillation. Borderline prolonged QT. Ventricular rate is 69 bpm, ND interval is undetermined, QRS durations 87 ms, QT is 457/QTc is 490 ms. No acute T wave inversions concerning for acute myocardial ischemia. No ST elevations concerning for acute myocardial infarction. RADIOLOGY:   Non-plain film images such as CT, Ultrasound and MRI are read by the radiologist. Plain radiographic images are visualized and preliminarily interpreted by the emergency physician with the below findings:      ? Radiologist's Report Reviewed:  XR CHEST PORTABLE   Final Result   1. No acute disease.             ED BEDSIDE ULTRASOUND:   Performed by ED Physician - none    LABS:    I have reviewed and interpreted all of the currently available lab results from this visit (ifapplicable):  Results for orders placed or performed during the hospital encounter of 08/16/21   Rapid Influenza A/B Antigens    Specimen: Nasopharyngeal   Result Value Ref Range    Rapid Influenza A Ag Negative Negative    Rapid Influenza B Ag Negative Negative   COVID-19, Rapid    Specimen: Nasopharyngeal Swab   Result Value Ref Range    SARS-CoV-2, NAAT Not Detected Not Detected   CBC Auto Differential   Result Value Ref Range    WBC 6.4 4.0 - 11.0 K/uL    RBC 4.52 4.50 - 6.00 M/uL    Hemoglobin 13.7 13.0 - 18.0 g/dL    Hematocrit 41.9 40.0 - 54.0 %    MCV 92.7 80.0 - 100.0 fL    MCH 30.3 27.0 - 32.0 pg    MCHC 32.7 31.0 - 35.0 g/dL    RDW 14.3 11.0 - 16.0 %    Platelets 561 565 - 242 K/uL    MPV 10.4 (H) 6.0 - 10.0 fL    Neutrophils % 75.9 %    Immature Granulocytes % 0.8 0.0 - 5.0 %    Lymphocytes % 13.5 %    Monocytes % 8.1 %    Eosinophils % 1.4 %    Basophils % 0.3 %    Neutrophils Absolute 4.9 2.0 - 7.5 K/uL    Immature Granulocytes # 0.1 K/uL    Lymphocytes Absolute 0.9 (L) 1.5 - 4.0 K/uL    Monocytes Absolute 0.5 0.2 - 0.8 K/uL    Eosinophils Absolute 0.1 0.0 - 0.4 K/uL    Basophils Absolute 0.0 0.0 - 0.1 K/uL   Comprehensive Metabolic Panel   Result Value Ref Range    Sodium 140 136 - 145 mmol/L    Potassium 3.9 3.4 - 5.1 mmol/L    Chloride 105 98 - 107 mmol/L    CO2 26 20 - 30 mmol/L    Anion Gap 9 3 - 16    Glucose 105 74 - 106 mg/dL    BUN 29 (H) 6 - 20 mg/dL    CREATININE 1.2 0.4 - 1.2 mg/dL    GFR Non-African American 59 (L) >59    GFR African American >59 >59    Calcium 9.3 8.5 - 10.5 mg/dL    Total Protein 6.8 6.4 - 8.3 g/dL    Albumin 4.1 3.4 - 4.8 g/dL    Albumin/Globulin Ratio 1.5 0.8 - 2.0    Total Bilirubin 1.0 0.3 - 1.2 mg/dL    Alkaline Phosphatase 78 25 - 100 U/L    ALT 36 4 - 36 U/L    AST 20 8 - 33 U/L    Globulin 2.7 g/dL   Troponin   Result Value Ref Range    Troponin <0.30 <0.30 ng/mL   Brain Natriuretic Peptide   Result Value Ref Range    Pro-BNP 1,698 0 - 1,800 pg/mL        All other labs were within normal range or not returned as of this dictation.     EMERGENCY DEPARTMENT COURSE and DIFFERENTIAL DIAGNOSIS/MDM:   Vitals:    Vitals:    08/16/21 1629 08/16/21 1751 08/16/21 1833   BP: (!) 159/108 136/88    Pulse: 76 65    Resp: 24 18    Temp: 97.9 °F (36.6 °C)  98.5 °F (36.9 °C)   TempSrc: Oral  Oral   SpO2: 99% 96%    Weight: 265 lb (120.2 kg)     Height: 6' (1.829 m)         MEDICATIONS ADMINISTERED IN ED:  Medications - No data to display    After initial evaluation and examination I did have a conversation with the patient about the upcoming plan, treatment possible disposition which she was agreeable to the times dictation. Patient vies we perform portable chest radiograph for shortness of breath. We will also check a CBC, CMP, troponin, BNP, rapid influenza and a rapid Covid screen. Patient will also have twelve-lead EKG. Patient is completely asymptomatic while resting on the stretcher. Patient denies any complaints but was found to have a what looks like A. fib on the telemetry monitor. Patient states he has had a history of atrial fibrillation in the past when he had his open heart surgery but has not had any as far as he knows within the last 6 years. Patient does follow-up with Dr. Magdiel Hernandez in [place] as his cardiologist.  He is also had repair of aneurysms which he follows with a surgeon for that. Patient denies any other complaints at this time. Final disposition be determined once his radiological diagnostic studies been performed reviewed. Rapid influenza swab was negative    Rapid Covid screen was negative    Blood work showed white count 6400, hemoglobin is 13.7, hematocrit 41.9, platelet counts 138. Comprehensive metabolic panel was benign except for a BUN of 29. Troponin was nondetectable less than 0.30. proBNP was 1698 still within normal limit. Portable chest radiograph read by radiology as no acute disease. Patient's radiological diagnostic studies were discussed with him and he does state his understanding.   Patient advised that he is in atrial fibrillation at this time but he is rate controlled based on his EKG he is already on atenolol. Patient's chads 2 vascular score come out to a 3 which would place him at the high moderate or above risk and probably needs addition of anticoagulation to his regimen. Patient advised this is usually done with the Xarelto or Eliquis. After discussion with the patient he is not comfortable with us starting that medication here today. Patient states he wants to discuss it with his regular family physician and Dr. Kitty Nowak his cardiologist before he gets started on that medication. He does understand the risk that he has had a 4.6% risk of stroke/TIA/embolism and a 3.2% yearly risk of stroke based on his findings. Again he states his understanding of this but he wants to wait to speak with his regular physicians before starting any different medication at this time. Otherwise patient's evaluation was benign I do believe his atrial fibrillation is what is causing the majority of his symptoms. Patient be discharged home in stable condition. The patient advised that he does need to follow-up with his regular family physician within the next 1 to 2 days reevaluation. He is also given instructions that if his symptoms worsens or new symptoms arise he should return back to the emergency department for further evaluation work-up. Patient also advised that he should follow-up with Dr. Kitty Nowak his cardiologist within the next 1 week if possible for discussion of being started on anticoagulation for chads vascular score of 3. CONSULTS:  None    PROCEDURES:  Procedures    CRITICAL CARE TIME    Total Critical Care time was 0 minutes, excluding separately reportable procedures. There was a high probability of clinically significant/life threatening deterioration in the patient's condition which required my urgent intervention. FINAL IMPRESSION      1.  Atrial fibrillation, unspecified type [facility])          DISPOSITION/PLAN   DISPOSITION        PATIENT REFERRED TO:  ISMA Dasilva - [address]  [phone]    In 2 days      [facility] Emergency Department  [address] [facility] [address],[facility] [place] [address] [phone]    As needed, If symptoms worsen    Mendel Mahi Swift [facility]  [phone]    Schedule an appointment as soon as possible for a visit in 1 week        DISCHARGE MEDICATIONS:  New Prescriptions    No medications on file       Comment: Please note this report has been produced using speech recognition software and may contain errorsrelated to that system including errors in grammar, punctuation, and spelling, as well as words and phrases that may be inappropriate. If there are any questions or concerns please feel free to contact the dictating providerfor clarification.     Shawnee Gage DO  Attending Emergency Physician              Shawnee Gage DO  08/16/21 2062

## 2021-08-19 ENCOUNTER — OFFICE VISIT (OUTPATIENT)
Dept: CARDIOLOGY | Facility: CLINIC | Age: 76
End: 2021-08-19

## 2021-08-19 VITALS
OXYGEN SATURATION: 95 % | SYSTOLIC BLOOD PRESSURE: 140 MMHG | RESPIRATION RATE: 22 BRPM | HEART RATE: 81 BPM | BODY MASS INDEX: 37.64 KG/M2 | WEIGHT: 284 LBS | DIASTOLIC BLOOD PRESSURE: 80 MMHG | HEIGHT: 73 IN

## 2021-08-19 DIAGNOSIS — E11.51 TYPE 2 DIABETES MELLITUS WITH DIABETIC PERIPHERAL ANGIOPATHY WITHOUT GANGRENE, WITH LONG-TERM CURRENT USE OF INSULIN (HCC): ICD-10-CM

## 2021-08-19 DIAGNOSIS — I48.0 PAROXYSMAL ATRIAL FIBRILLATION (HCC): Primary | ICD-10-CM

## 2021-08-19 DIAGNOSIS — I10 ESSENTIAL HYPERTENSION: ICD-10-CM

## 2021-08-19 DIAGNOSIS — Z79.4 TYPE 2 DIABETES MELLITUS WITH DIABETIC PERIPHERAL ANGIOPATHY WITHOUT GANGRENE, WITH LONG-TERM CURRENT USE OF INSULIN (HCC): ICD-10-CM

## 2021-08-19 DIAGNOSIS — E78.5 HYPERLIPIDEMIA LDL GOAL <100: ICD-10-CM

## 2021-08-19 PROCEDURE — 99214 OFFICE O/P EST MOD 30 MIN: CPT | Performed by: INTERNAL MEDICINE

## 2021-08-19 RX ORDER — FUROSEMIDE 40 MG/1
40 TABLET ORAL DAILY
Qty: 30 TABLET | Refills: 3 | Status: SHIPPED | OUTPATIENT
Start: 2021-08-19 | End: 2021-11-18

## 2021-08-19 NOTE — PROGRESS NOTES
[facility] Cardiology Office Follow Up Note    Bryan Roman  [id number]  2021    Primary Care Provider: Génesis Vaca MD    Chief Complaint: Newly diagnosed atrial fibrillation    History of Present Illness:   Mr. Bryan Roman is a 75 y.o. male who presents to the Cardiology Clinic for regular follow-up. The patient has a past medical history significant for type 2 diabetes mellitus, hypercholesterolemia, GERD, gout, and obesity.  He has a past vascular history significant for ascending aortic aneurysm status post repair in 9/15.  He is also known to have an abdominal aortic aneurysm, which is followed by vascular surgery. He presents to the cardiology clinic today for routine follow-up.  Since his last appointment, the patient was evaluated at an outside emergency department earlier this week for shortness of breath.  At that time, he was found to be in rate controlled atrial fibrillation.  Following discharge home, the patient reports resolution of his shortness of breath.  He currently denies palpitations.  No chest pain or chest discomfort.  No history of orthopnea, PND, or significant lower extremity swelling.  No specific complaints at this time.    Past Cardiac Testin. Last Coronary Angio: None  2. Prior Stress Testing: GXT 9/15              1.  No evidence of inducible ischemia  3. Last Echo:               1.  Normal left ventricular systolic function              2.  Aortic dilatation at 5.1 cm              3.  Mild AI and trivial MR  4. Prior Holter Monitor: None    Review of Systems:   Review of Systems   Constitutional: Negative for activity change, appetite change, chills, diaphoresis, fatigue, fever, unexpected weight gain and unexpected weight loss.   Eyes: Negative for blurred vision.   Respiratory: Negative for cough, chest tightness, shortness of breath and wheezing.    Cardiovascular: Negative for chest pain, palpitations and leg swelling.    Gastrointestinal: Negative for abdominal pain, anal bleeding, blood in stool and GERD.   Endocrine: Negative for cold intolerance and heat intolerance.   Genitourinary: Negative for hematuria.   Neurological: Negative for dizziness, syncope, weakness and light-headedness.   Hematological: Does not bruise/bleed easily.   Psychiatric/Behavioral: Negative for depressed mood and stress. The patient is not nervous/anxious.        I have reviewed and/or updated the patient's past medical, past surgical, family, social history, problem list and allergies as appropriate.     Medications:     Current Outpatient Medications:   •  amLODIPine (NORVASC) 10 MG tablet, Take 1 tablet by mouth Daily. Indications: High Blood Pressure Disorder, Disp: 90 tablet, Rfl: 3  •  atenolol (TENORMIN) 100 MG tablet, Take 2 tablets by mouth Daily., Disp: 180 tablet, Rfl: 3  •  Blood Glucose Monitoring Suppl device, , Disp: , Rfl:   •  glipizide (GLUCOTROL XL) 5 MG ER tablet, TAKE 1 TABLET BY MOUTH DAILY. (Patient taking differently: Take 5 mg by mouth 2 (two) times a day.), Disp: 90 tablet, Rfl: 3  •  Glucose Blood (BLOOD GLUCOSE TEST) strip, , Disp: , Rfl:   •  hydroCHLOROthiazide (HYDRODIURIL) 25 MG tablet, Take 1 tablet by mouth Daily. Indications: High Blood Pressure Disorder, Disp: 90 tablet, Rfl: 3  •  indomethacin (INDOCIN) 50 MG capsule, Take 1 capsule by mouth 2 (Two) Times a Day With Meals. Indications: arthritic pain (Patient taking differently: Take 50 mg by mouth 2 (Two) Times a Day As Needed. Indications: arthritic pain), Disp: 180 capsule, Rfl: 3  •  Lancets misc, , Disp: , Rfl:   •  losartan (COZAAR) 100 MG tablet, Take 1 tablet by mouth Daily. Indications: High Blood Pressure Disorder, diabetes, Disp: 90 tablet, Rfl: 3  •  mupirocin (BACTROBAN) 2 % ointment, , Disp: , Rfl:   •  Red Yeast Rice 600 MG capsule, Take  by mouth., Disp: , Rfl:   •  triamcinolone (KENALOG) 0.1 % cream, Apply  topically to the appropriate area as  "directed 2 (Two) Times a Day., Disp: 80 g, Rfl: 0  •  TRUETEST TEST test strip, 1 each by Other route as needed., Disp: , Rfl:   •  apixaban (ELIQUIS) 5 MG tablet tablet, Take 1 tablet by mouth Every 12 (Twelve) Hours., Disp: 60 tablet, Rfl: 3  •  furosemide (LASIX) 40 MG tablet, Take 1 tablet by mouth Daily., Disp: 30 tablet, Rfl: 3    Physical Exam:  Vital Signs:   Vitals:    08/19/21 1123   BP: 140/80   BP Location: Left arm   Patient Position: Sitting   Pulse: 81   Resp: 22   SpO2: 95%   Weight: 129 kg (284 lb)   Height: 185.4 cm (73\")       Physical Exam  Constitutional:       General: He is not in acute distress.     Appearance: Normal appearance. He is well-developed. He is not diaphoretic.   HENT:      Head: Normocephalic and atraumatic.   Eyes:      General: No scleral icterus.     Pupils: Pupils are equal, round, and reactive to light.   Neck:      Trachea: No tracheal deviation.   Cardiovascular:      Rate and Rhythm: Normal rate. Rhythm irregular.      Heart sounds: Normal heart sounds. No murmur heard.   No friction rub. No gallop.       Comments: Normal JVD.  Pulmonary:      Effort: Pulmonary effort is normal. No respiratory distress.      Breath sounds: Normal breath sounds. No stridor. No wheezing or rales.   Chest:      Chest wall: No tenderness.   Abdominal:      General: Bowel sounds are normal. There is no distension.      Palpations: Abdomen is soft.      Tenderness: There is no abdominal tenderness. There is no guarding or rebound.   Musculoskeletal:         General: No swelling. Normal range of motion.      Cervical back: Neck supple. No tenderness.   Lymphadenopathy:      Cervical: No cervical adenopathy.   Skin:     General: Skin is warm and dry.      Findings: No erythema.   Neurological:      General: No focal deficit present.      Mental Status: He is alert and oriented to person, place, and time.   Psychiatric:         Mood and Affect: Mood normal.         Behavior: Behavior normal. "         Results Review:   I reviewed the patient's new clinical results.  I personally viewed and interpreted the patient's EKG/Telemetry data      ECG 12 Lead    Date/Time: 8/20/2021 3:28 PM  Performed by: Imer Guidry MD  Authorized by: Imer Guidry MD   Comparison: not compared with previous ECG   Rhythm: atrial fibrillation  Rate: normal  QRS axis: normal  Other findings comments: Cannot rule out prior anterior MI    Clinical impression: abnormal EKG            Assessment / Plan:     1.  Persistent atrial fibrillation  --Recently found to be in atrial fibrillation, suspect persistent  --ECG today shows rate controlled atrial fibrillation  --Appears to be asymptomatic  --Continue atenolol for rate control strategy  --Given elevated [id number] score, start Eliquis for CVA prophylaxis  --Echocardiogram to evaluate underlying systolic function    2.  Essential hypertension  --Hypertensive today with systolic  mmHg  --Will continue current antihypertensives for now  --Reevaluate BP upon follow-up in 1 week, consider titration of antihypertensives at that time if persistent hypertension     3.  Hyperlipidemia LDL goal <100  --Lipid profile in 11/20 with triglycerides 450, LDL 81, HDL 26  --History of statin intolerance  --Will discussed the addition of fibrate therapy upon next follow-up     4.  AAA (abdominal aortic aneurysm)   --Continues to follow on a regular basis with vascular surgery at      5.  Type 2 diabetes mellitus   --Management per PCP        Follow Up:   Return in about 1 week (around 8/26/2021) for with Eli.      Thank you for allowing me to participate in the care of your patient. Please to not hesitate to contact me with additional questions or concerns.     JAMIE Guidry MD  Interventional Cardiology   08/19/2021  11:20 EDT

## 2021-08-20 PROBLEM — I48.0 PAROXYSMAL ATRIAL FIBRILLATION: Status: ACTIVE | Noted: 2021-08-20

## 2021-08-20 PROCEDURE — 93000 ELECTROCARDIOGRAM COMPLETE: CPT | Performed by: INTERNAL MEDICINE

## 2021-08-26 ENCOUNTER — OFFICE VISIT (OUTPATIENT)
Dept: CARDIOLOGY | Facility: CLINIC | Age: 76
End: 2021-08-26

## 2021-08-26 DIAGNOSIS — E78.5 HYPERLIPIDEMIA LDL GOAL <100: ICD-10-CM

## 2021-08-26 DIAGNOSIS — I48.0 PAROXYSMAL ATRIAL FIBRILLATION (HCC): ICD-10-CM

## 2021-08-26 DIAGNOSIS — I10 ESSENTIAL HYPERTENSION: Primary | ICD-10-CM

## 2021-08-26 PROCEDURE — 99213 OFFICE O/P EST LOW 20 MIN: CPT | Performed by: NURSE PRACTITIONER

## 2021-08-27 PROCEDURE — 93000 ELECTROCARDIOGRAM COMPLETE: CPT | Performed by: NURSE PRACTITIONER

## 2021-08-27 NOTE — PROGRESS NOTES
[facility] Cardiology Office Follow Up Note    Bryan Roman  [id number]  2021    Primary Care Provider: Génesis Vaca MD   Referring Provider: No ref. provider found    Chief Complaint: Follow-up for hypertension    History of Present Illness:   Mr. Bryan Roman is a 76 y.o. male who presents to the Cardiology Clinic for follow up of hypertension.  The patient has a past medical history significant for type 2 diabetes mellitus, hypercholesterolemia, GERD, gout, and obesity.  He has a past vascular history significant for ascending aortic aneurysm status post repair in 9/15.  He is also known to have an abdominal aortic aneurysm, which is followed by vascular surgery.  The patient was recently evaluated at an outside emergency department for  shortness of breath.  At that time, he was found to be in rate controlled atrial fibrillation.  Following discharge home, the patient reported resolution of his shortness of breath.  At his last office visit, the patient was hypertensive with an SBP of 140 mmHg.  An echocardiogram was ordered to evaluated underlying systolic function.  He presents today for follow-up of hypertension.  The patient reports BP readings in the 130s at home.  He reports trying to adhere to a low-sodium diet.  He specifically denies chest pain, dyspnea.  He denies palpitations, dizziness, syncope.  He denies orthopnea, PND, lower extremity edema.  He is tolerating his prescribed medications without perceived side effects.  There is no significant bruising or bleeding with Eliquis.  He offers no other complaints or concerns at this time.          Past Cardiac Testin. Last Coronary Angio: None  2. Prior Stress Testing: GXT 9/15              1.  No evidence of inducible ischemia  3. Last Echo: Pending  Previous echo:               1.  Normal left ventricular systolic function              2.  Aortic dilatation at 5.1 cm              3.  Mild AI  and trivial MR  4. Prior Holter Monitor: None    Review of Systems:   Review of Systems   Constitutional: Negative for activity change, chills, diaphoresis, fatigue, fever and unexpected weight gain.   Eyes: Negative for blurred vision and visual disturbance.   Respiratory: Negative for apnea, cough, chest tightness, shortness of breath and wheezing.    Cardiovascular: Negative for chest pain, palpitations and leg swelling.   Gastrointestinal: Negative for abdominal distention, blood in stool, GERD and indigestion.   Endocrine: Negative for cold intolerance and heat intolerance.   Genitourinary: Negative for hematuria.   Musculoskeletal: Negative for gait problem, joint swelling and myalgias.   Skin: Negative for color change, pallor and bruise.   Neurological: Negative for dizziness, seizures, syncope, weakness, light-headedness, numbness, headache and confusion.   Hematological: Does not bruise/bleed easily.   Psychiatric/Behavioral: Negative for behavioral problems, sleep disturbance, suicidal ideas and depressed mood.     I have reviewed and confirmed the accuracy of the ROS as documented by the MA/LPN/RN SARINA Polanco    I have reviewed and/or updated the patient's past medical, past surgical, family, social history, problem list and allergies as appropriate.     Medications:     Current Outpatient Medications:   •  amLODIPine (NORVASC) 10 MG tablet, Take 1 tablet by mouth Daily. Indications: High Blood Pressure Disorder, Disp: 90 tablet, Rfl: 3  •  apixaban (ELIQUIS) 5 MG tablet tablet, Take 1 tablet by mouth Every 12 (Twelve) Hours., Disp: 60 tablet, Rfl: 3  •  atenolol (TENORMIN) 100 MG tablet, Take 2 tablets by mouth Daily., Disp: 180 tablet, Rfl: 3  •  Blood Glucose Monitoring Suppl device, , Disp: , Rfl:   •  furosemide (LASIX) 40 MG tablet, Take 1 tablet by mouth Daily., Disp: 30 tablet, Rfl: 3  •  glipizide (GLUCOTROL XL) 5 MG ER tablet, TAKE 1 TABLET BY MOUTH DAILY. (Patient taking  "differently: Take 5 mg by mouth 2 (two) times a day.), Disp: 90 tablet, Rfl: 3  •  Glucose Blood (BLOOD GLUCOSE TEST) strip, , Disp: , Rfl:   •  hydroCHLOROthiazide (HYDRODIURIL) 25 MG tablet, Take 1 tablet by mouth Daily. Indications: High Blood Pressure Disorder, Disp: 90 tablet, Rfl: 3  •  indomethacin (INDOCIN) 50 MG capsule, Take 1 capsule by mouth 2 (Two) Times a Day With Meals. Indications: arthritic pain (Patient taking differently: Take 50 mg by mouth 2 (Two) Times a Day As Needed. Indications: arthritic pain), Disp: 180 capsule, Rfl: 3  •  Lancets misc, , Disp: , Rfl:   •  losartan (COZAAR) 100 MG tablet, Take 1 tablet by mouth Daily. Indications: High Blood Pressure Disorder, diabetes, Disp: 90 tablet, Rfl: 3  •  mupirocin (BACTROBAN) 2 % ointment, , Disp: , Rfl:   •  Red Yeast Rice 600 MG capsule, Take  by mouth., Disp: , Rfl:   •  triamcinolone (KENALOG) 0.1 % cream, Apply  topically to the appropriate area as directed 2 (Two) Times a Day., Disp: 80 g, Rfl: 0  •  TRUETEST TEST test strip, 1 each by Other route as needed., Disp: , Rfl:     Physical Exam:  Vital Signs:   Vitals:    08/26/21 1347 08/26/21 1355   BP:  132/76   BP Location:  Right arm   Patient Position:  Sitting   Cuff Size:  Adult   Pulse: 72    SpO2: 97%    Weight: 126 kg (277 lb)    Height: 185.4 cm (73\")      Body mass index is 36.55 kg/m².    Physical Exam  Vitals and nursing note reviewed.   Constitutional:       General: He is not in acute distress.     Appearance: Normal appearance. He is well-developed. He is obese.      Comments: BMI 36.6 kg/m²   HENT:      Head: Normocephalic and atraumatic.   Eyes:      General: No scleral icterus.     Extraocular Movements: Extraocular movements intact.   Neck:      Trachea: Trachea normal.      Comments: Normal JVD  Cardiovascular:      Rate and Rhythm: Normal rate and regular rhythm.      Pulses: Normal pulses.      Heart sounds: Normal heart sounds. No murmur heard.   No friction rub. No " umair.    Pulmonary:      Effort: Pulmonary effort is normal.      Breath sounds: Normal breath sounds.   Abdominal:      Palpations: Abdomen is soft.      Tenderness: There is no abdominal tenderness.   Musculoskeletal:         General: Normal range of motion.      Cervical back: Neck supple.      Right lower leg: No edema.      Left lower leg: No edema.   Skin:     General: Skin is warm and dry.      Findings: No bruising, lesion or rash.   Neurological:      Mental Status: He is alert and oriented to person, place, and time.      Motor: No weakness.      Gait: Gait normal.   Psychiatric:         Mood and Affect: Mood normal.         Behavior: Behavior normal. Behavior is cooperative.         Thought Content: Thought content does not include suicidal ideation.         Results Review:   I reviewed the patient's new clinical results.      ECG 12 Lead    Date/Time: 8/27/2021 6:14 PM  Performed by: Mikala Kulkarni APRN  Authorized by: Mikala Kulkarni APRN   Comparison: compared with previous ECG from 7/10/2020  Rhythm: atrial fibrillation  Ectopy: unifocal PVCs  Rate: normal  BPM: 84  QRS axis: normal    Clinical impression: abnormal EKG            Assessment / Plan:     1. Essential hypertension (Primary)  --Acceptable blood pressure today  --Continue low-sodium diet  --BP log given, patient will return to clinic with ongoing readings >135/85  --Recheck BP in 3 months or sooner if needed    2. Paroxysmal atrial fibrillation (CMS/HCC)  --Rate-controlled atrial fibrillation today  --Continue rate- and anticoagulation strategies    3. Hyperlipidemia LDL goal <100  --Patient reluctant to start fenofibrate; (he takes Red Yeast Rice for cholesterol)  --Last LDL 81 in 11/20  --Lipid panel ordered  --Discuss fenofibrate with Dr. Guidry in 3 months        Preventative Cardiology:   Tobacco Cessation: N/A   Advance Care Planning: ACP discussion was declined by the patient. Patient does not have an advance directive,  declines further assistance.     Follow Up:   Return in about 3 months (around 11/26/2021) for Follow-up with Dr. Guidry.      Thank you for allowing me to participate in the care of your patient. Please to not hesitate to contact me with additional questions or concerns.     SARINA Alcantara

## 2021-08-30 VITALS
WEIGHT: 277 LBS | OXYGEN SATURATION: 97 % | BODY MASS INDEX: 36.71 KG/M2 | DIASTOLIC BLOOD PRESSURE: 76 MMHG | HEART RATE: 72 BPM | SYSTOLIC BLOOD PRESSURE: 132 MMHG | HEIGHT: 73 IN

## 2021-09-02 ENCOUNTER — TELEPHONE (OUTPATIENT)
Dept: CARDIOLOGY | Facility: CLINIC | Age: 76
End: 2021-09-02

## 2021-10-25 ENCOUNTER — FLU SHOT (OUTPATIENT)
Dept: INTERNAL MEDICINE | Facility: CLINIC | Age: 76
End: 2021-10-25

## 2021-10-25 DIAGNOSIS — Z23 NEED FOR INFLUENZA VACCINATION: Primary | ICD-10-CM

## 2021-10-25 PROCEDURE — G0008 ADMIN INFLUENZA VIRUS VAC: HCPCS | Performed by: FAMILY MEDICINE

## 2021-10-25 PROCEDURE — 90662 IIV NO PRSV INCREASED AG IM: CPT | Performed by: FAMILY MEDICINE

## 2021-11-16 ENCOUNTER — OFFICE VISIT (OUTPATIENT)
Dept: INTERNAL MEDICINE | Facility: CLINIC | Age: 76
End: 2021-11-16

## 2021-11-16 VITALS
WEIGHT: 278.4 LBS | BODY MASS INDEX: 36.9 KG/M2 | OXYGEN SATURATION: 99 % | SYSTOLIC BLOOD PRESSURE: 138 MMHG | HEART RATE: 74 BPM | TEMPERATURE: 98.4 F | DIASTOLIC BLOOD PRESSURE: 80 MMHG | HEIGHT: 73 IN

## 2021-11-16 DIAGNOSIS — M31.6 TEMPORAL ARTERITIS (HCC): ICD-10-CM

## 2021-11-16 DIAGNOSIS — M79.10 MYALGIA: ICD-10-CM

## 2021-11-16 DIAGNOSIS — E11.59 HYPERTENSION ASSOCIATED WITH DIABETES (HCC): ICD-10-CM

## 2021-11-16 DIAGNOSIS — K21.9 GASTROESOPHAGEAL REFLUX DISEASE, UNSPECIFIED WHETHER ESOPHAGITIS PRESENT: ICD-10-CM

## 2021-11-16 DIAGNOSIS — R09.82 POSTNASAL DRIP: ICD-10-CM

## 2021-11-16 DIAGNOSIS — I71.21 ASCENDING AORTIC ANEURYSM (HCC): ICD-10-CM

## 2021-11-16 DIAGNOSIS — E66.01 CLASS 2 SEVERE OBESITY DUE TO EXCESS CALORIES WITH SERIOUS COMORBIDITY AND BODY MASS INDEX (BMI) OF 36.0 TO 36.9 IN ADULT (HCC): ICD-10-CM

## 2021-11-16 DIAGNOSIS — M10.9 GOUT, UNSPECIFIED CAUSE, UNSPECIFIED CHRONICITY, UNSPECIFIED SITE: ICD-10-CM

## 2021-11-16 DIAGNOSIS — Z78.9 STATIN INTOLERANCE: ICD-10-CM

## 2021-11-16 DIAGNOSIS — E11.69 HYPERLIPIDEMIA ASSOCIATED WITH TYPE 2 DIABETES MELLITUS (HCC): ICD-10-CM

## 2021-11-16 DIAGNOSIS — I71.20 THORACIC AORTIC ANEURYSM WITHOUT RUPTURE (HCC): ICD-10-CM

## 2021-11-16 DIAGNOSIS — I10 ESSENTIAL HYPERTENSION: ICD-10-CM

## 2021-11-16 DIAGNOSIS — I15.2 HYPERTENSION ASSOCIATED WITH DIABETES (HCC): ICD-10-CM

## 2021-11-16 DIAGNOSIS — Z00.00 MEDICARE ANNUAL WELLNESS VISIT, SUBSEQUENT: Primary | ICD-10-CM

## 2021-11-16 DIAGNOSIS — I50.32 CHRONIC DIASTOLIC (CONGESTIVE) HEART FAILURE (HCC): ICD-10-CM

## 2021-11-16 DIAGNOSIS — Z79.4 TYPE 2 DIABETES MELLITUS WITH DIABETIC PERIPHERAL ANGIOPATHY WITHOUT GANGRENE, WITH LONG-TERM CURRENT USE OF INSULIN (HCC): ICD-10-CM

## 2021-11-16 DIAGNOSIS — E78.5 HYPERLIPIDEMIA ASSOCIATED WITH TYPE 2 DIABETES MELLITUS (HCC): ICD-10-CM

## 2021-11-16 DIAGNOSIS — E53.8 VITAMIN B12 DEFICIENCY: ICD-10-CM

## 2021-11-16 DIAGNOSIS — E11.51 TYPE 2 DIABETES MELLITUS WITH DIABETIC PERIPHERAL ANGIOPATHY WITHOUT GANGRENE, WITH LONG-TERM CURRENT USE OF INSULIN (HCC): ICD-10-CM

## 2021-11-16 DIAGNOSIS — N18.31 STAGE 3A CHRONIC KIDNEY DISEASE (HCC): ICD-10-CM

## 2021-11-16 DIAGNOSIS — I48.0 PAROXYSMAL ATRIAL FIBRILLATION (HCC): ICD-10-CM

## 2021-11-16 PROCEDURE — G0439 PPPS, SUBSEQ VISIT: HCPCS | Performed by: FAMILY MEDICINE

## 2021-11-16 PROCEDURE — 99397 PER PM REEVAL EST PAT 65+ YR: CPT | Performed by: FAMILY MEDICINE

## 2021-11-16 PROCEDURE — 1159F MED LIST DOCD IN RCRD: CPT | Performed by: FAMILY MEDICINE

## 2021-11-16 PROCEDURE — 1170F FXNL STATUS ASSESSED: CPT | Performed by: FAMILY MEDICINE

## 2021-11-16 PROCEDURE — 96160 PT-FOCUSED HLTH RISK ASSMT: CPT | Performed by: FAMILY MEDICINE

## 2021-11-16 PROCEDURE — 1126F AMNT PAIN NOTED NONE PRSNT: CPT | Performed by: FAMILY MEDICINE

## 2021-11-16 RX ORDER — AMLODIPINE BESYLATE 5 MG/1
5 TABLET ORAL DAILY
Qty: 90 TABLET | Refills: 3 | Status: SHIPPED | OUTPATIENT
Start: 2021-11-16 | End: 2022-03-28 | Stop reason: SDUPTHER

## 2021-11-16 NOTE — PATIENT INSTRUCTIONS
Medicare Wellness  Personal Prevention Plan of Service     Date of Office Visit:  2021  Encounter Provider:  Génesis Vaca MD  Place of Service:  [facility] PRIMARY CARE  Patient Name: Bryan Roman  :  1945    As part of the Medicare Wellness portion of your visit today, we are providing you with this personalized preventive plan of services (PPPS). This plan is based upon recommendations of the [place] Preventive Services Task Force (USPSTF) and the Advisory Committee on Immunization Practices (ACIP).    This lists the preventive care services that should be considered, and provides dates of when you are due. Items listed as completed are up-to-date and do not require any further intervention.    Health Maintenance   Topic Date Due   • DIABETIC FOOT EXAM  Never done   • ANNUAL WELLNESS VISIT  11/10/2021   • LIPID PANEL  11/10/2021   • ZOSTER VACCINE (1 of 2) 2021 (Originally 1996)   • DIABETIC EYE EXAM  2022 (Originally 2021)   • TDAP/TD VACCINES (1 - Tdap) 2022 (Originally 1965)   • HEMOGLOBIN A1C  2021   • COVID-19 Vaccine (2 - Moderna 2-dose series) 2021   • URINE MICROALBUMIN  2022   • HEPATITIS C SCREENING  Completed   • INFLUENZA VACCINE  Completed   • Pneumococcal Vaccine 65+  Completed   • AAA SCREEN (ONE-TIME)  Completed   • COLORECTAL CANCER SCREENING  Discontinued       No orders of the defined types were placed in this encounter.      Return in about 6 months (around 2022) for Diabetes follow up.

## 2021-11-18 RX ORDER — FUROSEMIDE 40 MG/1
40 TABLET ORAL DAILY
Qty: 30 TABLET | Refills: 3 | Status: SHIPPED | OUTPATIENT
Start: 2021-11-18 | End: 2022-11-23

## 2021-11-18 NOTE — PROGRESS NOTES
Please let pt know vitamin B12 is better, continue current supplement. A1C okay at 6.4. continue current meds. Thyroid level okay. Vitamin D low. Sending weekly high dose supplement, take with food. Also try to get 15 min of sun exposure to face/arms daily. Blood counts normal. Chronic kidney disease stable. Cholesterol okay. Lab that is related to gout is elevated (uric acid), likely due to his blood pressure meds. If needed we can try changing these down road (or can discuss with cardiology)

## 2021-11-19 LAB
25(OH)D3+25(OH)D2 SERPL-MCNC: 20.8 NG/ML (ref 30–100)
ALBUMIN SERPL-MCNC: 4.6 G/DL (ref 3.5–5.2)
ALBUMIN/GLOB SERPL: 1.8 G/DL
ALP SERPL-CCNC: 84 U/L (ref 39–117)
ALT SERPL-CCNC: 17 U/L (ref 1–41)
AST SERPL-CCNC: 13 U/L (ref 1–40)
BILIRUB SERPL-MCNC: 1.4 MG/DL (ref 0–1.2)
BUN SERPL-MCNC: 28 MG/DL (ref 8–23)
BUN/CREAT SERPL: 20.9 (ref 7–25)
CALCIUM SERPL-MCNC: 10 MG/DL (ref 8.6–10.5)
CHLORIDE SERPL-SCNC: 104 MMOL/L (ref 98–107)
CHOLEST SERPL-MCNC: 158 MG/DL (ref 0–200)
CO2 SERPL-SCNC: 27.6 MMOL/L (ref 22–29)
CREAT SERPL-MCNC: 1.34 MG/DL (ref 0.76–1.27)
ERYTHROCYTE [DISTWIDTH] IN BLOOD BY AUTOMATED COUNT: 13.7 % (ref 12.3–15.4)
ERYTHROCYTE [SEDIMENTATION RATE] IN BLOOD BY WESTERGREN METHOD: 17 MM/HR (ref 0–20)
FOLATE SERPL-MCNC: 11.5 NG/ML (ref 4.78–24.2)
GLOBULIN SER CALC-MCNC: 2.6 GM/DL
GLUCOSE SERPL-MCNC: 186 MG/DL (ref 65–99)
HBA1C MFR BLD: 6.4 % (ref 4.8–5.6)
HCT VFR BLD AUTO: 48.6 % (ref 37.5–51)
HDLC SERPL-MCNC: 29 MG/DL (ref 40–60)
HGB BLD-MCNC: 15.5 G/DL (ref 13–17.7)
LDLC SERPL CALC-MCNC: 97 MG/DL (ref 0–100)
Lab: NORMAL
MCH RBC QN AUTO: 29.5 PG (ref 26.6–33)
MCHC RBC AUTO-ENTMCNC: 31.9 G/DL (ref 31.5–35.7)
MCV RBC AUTO: 92.6 FL (ref 79–97)
METHYLMALONATE SERPL-SCNC: 261 NMOL/L (ref 0–378)
PLATELET # BLD AUTO: 213 10*3/MM3 (ref 140–450)
POTASSIUM SERPL-SCNC: 4.2 MMOL/L (ref 3.5–5.2)
PROT SERPL-MCNC: 7.2 G/DL (ref 6–8.5)
RBC # BLD AUTO: 5.25 10*6/MM3 (ref 4.14–5.8)
SODIUM SERPL-SCNC: 139 MMOL/L (ref 136–145)
T4 FREE SERPL-MCNC: 1.37 NG/DL (ref 0.93–1.7)
TRIGL SERPL-MCNC: 180 MG/DL (ref 0–150)
TSH SERPL DL<=0.005 MIU/L-ACNC: 2.45 UIU/ML (ref 0.27–4.2)
URATE SERPL-MCNC: 8.8 MG/DL (ref 3.4–7)
VIT B12 SERPL-MCNC: 891 PG/ML (ref 211–946)
VLDLC SERPL CALC-MCNC: 32 MG/DL (ref 5–40)
WBC # BLD AUTO: 7.9 10*3/MM3 (ref 3.4–10.8)

## 2021-12-01 ENCOUNTER — OFFICE VISIT (OUTPATIENT)
Dept: CARDIOLOGY | Facility: CLINIC | Age: 76
End: 2021-12-01

## 2021-12-01 VITALS
HEIGHT: 73 IN | BODY MASS INDEX: 36.31 KG/M2 | DIASTOLIC BLOOD PRESSURE: 72 MMHG | OXYGEN SATURATION: 97 % | HEART RATE: 81 BPM | WEIGHT: 274 LBS | RESPIRATION RATE: 18 BRPM | SYSTOLIC BLOOD PRESSURE: 120 MMHG

## 2021-12-01 DIAGNOSIS — I10 ESSENTIAL HYPERTENSION: ICD-10-CM

## 2021-12-01 DIAGNOSIS — E78.5 HYPERLIPIDEMIA LDL GOAL <100: ICD-10-CM

## 2021-12-01 DIAGNOSIS — E11.51 TYPE 2 DIABETES MELLITUS WITH DIABETIC PERIPHERAL ANGIOPATHY WITHOUT GANGRENE, WITH LONG-TERM CURRENT USE OF INSULIN (HCC): ICD-10-CM

## 2021-12-01 DIAGNOSIS — I48.21 PERMANENT ATRIAL FIBRILLATION (HCC): Primary | ICD-10-CM

## 2021-12-01 DIAGNOSIS — E66.01 SEVERE OBESITY (BMI 35.0-39.9) WITH COMORBIDITY (HCC): ICD-10-CM

## 2021-12-01 DIAGNOSIS — Z79.4 TYPE 2 DIABETES MELLITUS WITH DIABETIC PERIPHERAL ANGIOPATHY WITHOUT GANGRENE, WITH LONG-TERM CURRENT USE OF INSULIN (HCC): ICD-10-CM

## 2021-12-01 PROCEDURE — 99214 OFFICE O/P EST MOD 30 MIN: CPT | Performed by: INTERNAL MEDICINE

## 2021-12-01 PROCEDURE — 93000 ELECTROCARDIOGRAM COMPLETE: CPT | Performed by: INTERNAL MEDICINE

## 2021-12-01 NOTE — PROGRESS NOTES
[facility] Cardiology Office Follow Up Note    Bryan Roman  [id number]  2021    Primary Care Provider: Génesis Vaca MD    Chief Complaint: Routine follow-up    History of Present Illness:   Mr. Bryan Roman is a 76 y.o. male who presents to the Cardiology Clinic for routine follow-up.  The patient has a past medical history significant for type 2 diabetes mellitus, hypercholesterolemia, GERD, gout, and obesity.  He has a past vascular history significant for ascending aortic aneurysm status post repair in 9/15.  He is also known to have an abdominal aortic aneurysm, which is followed by vascular surgery.  He has a past cardiac history significant for chronic atrial fibrillation..  He returns to cardiology clinic today for routine follow-up.  Since his last appointment, the patient reports he has been well without any significant changes in his health.  He remains asymptomatic in terms of his atrial fibrillation, without palpitations.  He continues to tolerate Eliquis without significant bleeding or bruising.  No exertional chest pain or anginal symptoms.  No significant orthopnea, PND, or lower extremity swelling.  No specific complaints today.     Past Cardiac Testin. Last Coronary Angio: None  2. Prior Stress Testing: GXT 9/15              1.  No evidence of inducible ischemia  3. Last Echo: 9/10/2021   1.  Normal left ventricular size and systolic function, LVEF 55-60%.   2.  Grade 1 diastolic dysfunction.   3.  Mild to moderate concentric LVH.   4.  Mild right ventricular dilation with normal RV systolic function.   5.  Mild aortic regurgitation.   6.  Mild tricuspid regurgitation, RVSP 36 mmHg.  4. Prior Holter Monitor: None    Review of Systems:   Review of Systems   Constitutional: Negative for activity change, appetite change, chills, diaphoresis, fatigue, fever, unexpected weight gain and unexpected weight loss.   Eyes: Negative for blurred vision and  double vision.   Respiratory: Negative for cough, chest tightness, shortness of breath and wheezing.    Cardiovascular: Negative for chest pain, palpitations and leg swelling.   Gastrointestinal: Negative for abdominal pain, anal bleeding, blood in stool and GERD.   Endocrine: Negative for cold intolerance and heat intolerance.   Genitourinary: Negative for hematuria.   Neurological: Negative for dizziness, syncope, weakness and light-headedness.   Hematological: Does not bruise/bleed easily.   Psychiatric/Behavioral: Negative for depressed mood and stress. The patient is not nervous/anxious.        I have reviewed and/or updated the patient's past medical, past surgical, family, social history, problem list and allergies as appropriate.     Medications:     Current Outpatient Medications:   •  amLODIPine (NORVASC) 5 MG tablet, Take 1 tablet by mouth Daily. Indications: High Blood Pressure Disorder, Disp: 90 tablet, Rfl: 3  •  apixaban (ELIQUIS) 5 MG tablet tablet, Take 1 tablet by mouth Every 12 (Twelve) Hours., Disp: 180 tablet, Rfl: 3  •  atenolol (TENORMIN) 100 MG tablet, Take 2 tablets by mouth Daily., Disp: 180 tablet, Rfl: 3  •  Blood Glucose Monitoring Suppl device, , Disp: , Rfl:   •  furosemide (LASIX) 40 MG tablet, TAKE 1 TABLET BY MOUTH DAILY., Disp: 30 tablet, Rfl: 3  •  glipizide (GLUCOTROL XL) 5 MG ER tablet, TAKE 1 TABLET BY MOUTH DAILY. (Patient taking differently: Take 5 mg by mouth 2 (two) times a day.), Disp: 90 tablet, Rfl: 3  •  Glucose Blood (BLOOD GLUCOSE TEST) strip, , Disp: , Rfl:   •  hydroCHLOROthiazide (HYDRODIURIL) 25 MG tablet, Take 1 tablet by mouth Daily. Indications: High Blood Pressure Disorder, Disp: 90 tablet, Rfl: 3  •  indomethacin (INDOCIN) 50 MG capsule, Take 1 capsule by mouth 2 (Two) Times a Day With Meals. Indications: arthritic pain (Patient taking differently: Take 50 mg by mouth 2 (Two) Times a Day As Needed. Indications: arthritic pain), Disp: 180 capsule, Rfl: 3  •   "Lancets misc, , Disp: , Rfl:   •  losartan (COZAAR) 100 MG tablet, Take 1 tablet by mouth Daily. Indications: High Blood Pressure Disorder, diabetes, Disp: 90 tablet, Rfl: 3  •  mupirocin (BACTROBAN) 2 % ointment, , Disp: , Rfl:   •  Red Yeast Rice 600 MG capsule, Take  by mouth., Disp: , Rfl:   •  triamcinolone (KENALOG) 0.1 % cream, Apply  topically to the appropriate area as directed 2 (Two) Times a Day., Disp: 80 g, Rfl: 0  •  TRUETEST TEST test strip, 1 each by Other route as needed., Disp: , Rfl:     Physical Exam:  Vital Signs:   Vitals:    12/01/21 1311   BP: 120/72   BP Location: Right arm   Patient Position: Sitting   Pulse: 81   Resp: 18   SpO2: 97%   Weight: 124 kg (274 lb)   Height: 185.4 cm (73\")       Physical Exam  Constitutional:       General: He is not in acute distress.     Appearance: He is well-developed. He is obese. He is not diaphoretic.   HENT:      Head: Normocephalic and atraumatic.   Eyes:      General: No scleral icterus.     Pupils: Pupils are equal, round, and reactive to light.   Neck:      Trachea: No tracheal deviation.   Cardiovascular:      Rate and Rhythm: Normal rate. Rhythm irregular.      Heart sounds: Normal heart sounds. No murmur heard.  No friction rub. No gallop.       Comments: Normal JVD  Pulmonary:      Effort: Pulmonary effort is normal. No respiratory distress.      Breath sounds: Normal breath sounds. No stridor. No wheezing or rales.   Chest:      Chest wall: No tenderness.   Abdominal:      General: Bowel sounds are normal. There is no distension.      Palpations: Abdomen is soft.      Tenderness: There is no abdominal tenderness. There is no guarding or rebound.   Musculoskeletal:         General: No swelling. Normal range of motion.      Cervical back: Neck supple. No tenderness.   Lymphadenopathy:      Cervical: No cervical adenopathy.   Skin:     General: Skin is warm and dry.      Findings: No erythema.   Neurological:      General: No focal deficit present. "      Mental Status: He is alert and oriented to person, place, and time.   Psychiatric:         Mood and Affect: Mood normal.         Behavior: Behavior normal.         Results Review:   I reviewed the patient's new clinical results.  I personally viewed and interpreted the patient's EKG/Telemetry data      ECG 12 Lead    Date/Time: 12/1/2021 1:29 PM  Performed by: Imer Guidry MD  Authorized by: Imer Guidry MD   Comparison: not compared with previous ECG   Rhythm: atrial fibrillation  Rate: normal  QRS axis: normal  Other findings comments: Cannot rule out prior anterior MI    Clinical impression: abnormal EKG            Assessment / Plan:     1.    Minute atrial fibrillation  --Remains asymptomatic without palpitations  --ECG today continues to show rate controlled atrial fibrillation  --Continue atenolol for ventricular rate control strategy  --Given elevated [id number] score, continue Eliquis for CVA prophylaxis  --Follow-up in 1 year, sooner if required     2.  Essential hypertension  --BP well-controlled today  --To new current antihypertensives     3.  Hyperlipidemia LDL goal <100  --Lipid profile in 11/21 with LDL 97, HDL 29, triglycerides 180  --History of statin intolerance  --Lipid profile improving, will therefore hold off on fibrate therapy and continue lifestyle modifications     4.  AAA (abdominal aortic aneurysm)   --Reportedly stable  --Follows with vascular surgery at      5.  Type 2 diabetes mellitus   --Hemoglobin A1c 11/21 6.4%  --Managed by PCP    6.  Obesity, BMI 36 kg/m²  --Advised weight loss through diet and exercise      Follow Up:   Return in about 1 year (around 12/1/2022).      Thank you for allowing me to participate in the care of your patient. Please to not hesitate to contact me with additional questions or concerns.     JAMIE Guidry MD  Interventional Cardiology   12/01/2021  13:16 EST

## 2021-12-02 ENCOUNTER — TELEPHONE (OUTPATIENT)
Dept: INTERNAL MEDICINE | Facility: CLINIC | Age: 76
End: 2021-12-02

## 2021-12-02 NOTE — TELEPHONE ENCOUNTER
Caller: Bryan Roman    Relationship: Self    Best call back number:[phone]    What medication are you requesting: VITAMIN D    If a prescription is needed, what is your preferred pharmacy and phone number: [facility] - [address] - [phone]  - [phone] FX     Additional notes:PATIENT STATES THAT A PRESCRIPTION FOR VITAMIN D WAS SUPPOSED TO BEE SENT LAST WEEK

## 2021-12-06 RX ORDER — CHOLECALCIFEROL (VITAMIN D3) 1250 MCG
50000 CAPSULE ORAL
Qty: 12 CAPSULE | Refills: 1 | Status: SHIPPED | OUTPATIENT
Start: 2021-12-06 | End: 2023-02-10

## 2021-12-06 NOTE — TELEPHONE ENCOUNTER
Not sure what is going on with this patient. I cant see that we have sent in Vitamin D but I do see in the lab note that you did intend to send it in but it wasn't last week.

## 2021-12-07 DIAGNOSIS — E11.59 HYPERTENSION ASSOCIATED WITH DIABETES (HCC): Primary | ICD-10-CM

## 2021-12-07 DIAGNOSIS — I15.2 HYPERTENSION ASSOCIATED WITH DIABETES (HCC): Primary | ICD-10-CM

## 2021-12-07 RX ORDER — HYDROCHLOROTHIAZIDE 25 MG/1
25 TABLET ORAL DAILY
Qty: 90 TABLET | Refills: 3 | Status: SHIPPED | OUTPATIENT
Start: 2021-12-07 | End: 2023-01-03

## 2021-12-07 NOTE — TELEPHONE ENCOUNTER
Rx Refill Note  Requested Prescriptions     Pending Prescriptions Disp Refills   • hydroCHLOROthiazide (HYDRODIURIL) 25 MG tablet [Pharmacy Med Name: HCTZ 25MG TABLETS 25 Tablet] 90 tablet 3     Sig: TAKE 1 TABLET BY MOUTH DAILY. INDICATIONS: HIGH BLOOD PRESSURE DISORDER      Last office visit with prescribing clinician: 11/16/2021      Next office visit with prescribing clinician: 5/16/2022            VICENTE GUTIERREZ MA  12/07/21, 14:40 EST

## 2021-12-09 ENCOUNTER — TELEPHONE (OUTPATIENT)
Dept: INTERNAL MEDICINE | Facility: CLINIC | Age: 76
End: 2021-12-09

## 2021-12-09 NOTE — TELEPHONE ENCOUNTER
Caller: Bryan Roman    Relationship: Self    Best call back number: [phone]    What medication are you requesting: ANTIBIOTICS    What are your current symptoms: COUGH, CONGESTION, DRAINAGE    How long have you been experiencing symptoms: 3 WEEKS AGO    Have you had these symptoms before:    [x] Yes  [] No    Have you been treated for these symptoms before:   [x] Yes  [] No    If a prescription is needed, what is your preferred pharmacy and phone number: [address] - [phone] [facility] [phone]

## 2021-12-10 ENCOUNTER — OFFICE VISIT (OUTPATIENT)
Dept: PRIMARY CARE CLINIC | Age: 76
End: 2021-12-10
Payer: MEDICARE

## 2021-12-10 ENCOUNTER — TELEPHONE (OUTPATIENT)
Dept: INTERNAL MEDICINE | Facility: CLINIC | Age: 76
End: 2021-12-10

## 2021-12-10 VITALS — TEMPERATURE: 97.9 F | HEART RATE: 85 BPM | OXYGEN SATURATION: 99 %

## 2021-12-10 DIAGNOSIS — R05.9 COUGH: Primary | ICD-10-CM

## 2021-12-10 PROCEDURE — G8428 CUR MEDS NOT DOCUMENT: HCPCS | Performed by: PHYSICIAN ASSISTANT

## 2021-12-10 PROCEDURE — G8419 CALC BMI OUT NRM PARAM NOF/U: HCPCS | Performed by: PHYSICIAN ASSISTANT

## 2021-12-10 PROCEDURE — G8484 FLU IMMUNIZE NO ADMIN: HCPCS | Performed by: PHYSICIAN ASSISTANT

## 2021-12-10 PROCEDURE — 99213 OFFICE O/P EST LOW 20 MIN: CPT | Performed by: PHYSICIAN ASSISTANT

## 2021-12-10 PROCEDURE — 1036F TOBACCO NON-USER: CPT | Performed by: PHYSICIAN ASSISTANT

## 2021-12-10 PROCEDURE — 1123F ACP DISCUSS/DSCN MKR DOCD: CPT | Performed by: PHYSICIAN ASSISTANT

## 2021-12-10 PROCEDURE — 4040F PNEUMOC VAC/ADMIN/RCVD: CPT | Performed by: PHYSICIAN ASSISTANT

## 2021-12-10 PROCEDURE — 3017F COLORECTAL CA SCREEN DOC REV: CPT | Performed by: PHYSICIAN ASSISTANT

## 2021-12-10 RX ORDER — DOXYCYCLINE HYCLATE 100 MG
100 TABLET ORAL 2 TIMES DAILY
Qty: 14 TABLET | Refills: 0 | Status: SHIPPED | OUTPATIENT
Start: 2021-12-10 | End: 2021-12-17

## 2021-12-10 RX ORDER — BENZONATATE 200 MG/1
200 CAPSULE ORAL 3 TIMES DAILY PRN
Qty: 30 CAPSULE | Refills: 0 | Status: SHIPPED | OUTPATIENT
Start: 2021-12-10 | End: 2021-12-17

## 2021-12-10 ASSESSMENT — ENCOUNTER SYMPTOMS
SHORTNESS OF BREATH: 0
SORE THROAT: 0
COUGH: 1
GASTROINTESTINAL NEGATIVE: 1

## 2021-12-10 ASSESSMENT — PATIENT HEALTH QUESTIONNAIRE - PHQ9: DEPRESSION UNABLE TO ASSESS: URGENT/EMERGENT SITUATION

## 2021-12-10 NOTE — PROGRESS NOTES
SUBJECTIVE:    Patient ID: Rhea Henderson is a 77 y. o.male. No chief complaint on file. HPI:    Pt here with c/o cough for the last month. He states he is also have body aches but denies any other URI sxs, fever, CP, SOB. He declines covid test, flu test, he declines CXR. He has a known h/o a fib per pt. Patient's medications, allergies, past medical, surgical, social and family histories were reviewed and updated as appropriate in electronic medical record. Current Outpatient Medications on File Prior to Visit   Medication Sig Dispense Refill    losartan (COZAAR) 100 MG tablet Take 25 mg by mouth daily       amLODIPine (NORVASC) 10 MG tablet Take 10 mg by mouth daily      glipiZIDE (GLUCOTROL) 5 MG tablet Take 5 mg by mouth daily       aspirin 81 MG EC tablet Take 81 mg by mouth daily      atenolol (TENORMIN) 100 MG tablet TAKE TWO TABLETS BY MOUTH ONCE DAILY (Patient taking differently: 100 mg daily ) 60 tablet 3    hydrochlorothiazide (HYDRODIURIL) 25 MG tablet TAKE ONE TABLET BY MOUTH ONCE DAILY 90 tablet 1    Blood Glucose Monitoring Suppl SYLVIA 1 each by Does not apply route daily. 1 Device 0    Lancets MISC 1 each by Does not apply route daily. 100 each 3    Glucose Blood (BLOOD GLUCOSE TEST STRIPS) STRP 1 each by Does not apply route daily. 100 strip 3     No current facility-administered medications on file prior to visit. Review of Systems   Constitutional: Negative. Negative for chills and fever. HENT: Negative. Negative for congestion and sore throat. Respiratory: Positive for cough. Negative for shortness of breath. Cardiovascular: Negative. Gastrointestinal: Negative. Skin: Negative. Neurological: Negative. Psychiatric/Behavioral: Negative.         Past Medical History:   Diagnosis Date    Atrial fibrillation [facility])     Descending aortic aneurysm (HCC)     watching    Diabetes mellitus (HCC)     GERD (gastroesophageal reflux disease)     Gout left foot great toe    Hyperlipidemia     Hypertension      Past Surgical History:   Procedure Laterality Date    ABDOMINAL AORTIC ANEURYSM REPAIR      APPENDECTOMY  2001    ruptured with gaingreen   [facility] [place]     No family history on file. Social History     Tobacco Use   Smoking Status Never Smoker   Smokeless Tobacco Never Used       OBJECTIVE:   Wt Readings from Last 3 Encounters:   08/16/21 265 lb (120.2 kg)   06/24/18 263 lb (119.3 kg)   06/19/15 277 lb (125.6 kg)     BP Readings from Last 3 Encounters:   08/16/21 (!) 137/100   06/24/18 (!) 117/97   06/19/15 118/74       There were no vitals taken for this visit. Physical Exam  Vitals reviewed. Constitutional:       General: He is not in acute distress. Appearance: Normal appearance. He is normal weight. He is not ill-appearing or toxic-appearing. HENT:      Head: Normocephalic and atraumatic. Mouth/Throat:      Mouth: Mucous membranes are moist.      Pharynx: Oropharynx is clear. Eyes:      Conjunctiva/sclera: Conjunctivae normal.      Pupils: Pupils are equal, round, and reactive to light. Cardiovascular:      Rate and Rhythm: Normal rate. Rhythm irregular. Heart sounds: Normal heart sounds. No murmur heard. No gallop. Pulmonary:      Effort: Pulmonary effort is normal.      Breath sounds: Normal breath sounds. No wheezing, rhonchi or rales. Skin:     General: Skin is warm and dry. Neurological:      Mental Status: He is alert and oriented to person, place, and time. Psychiatric:         Mood and Affect: Mood normal.         Behavior: Behavior normal.         Thought Content:  Thought content normal.         Judgment: Judgment normal.         Lab Results   Component Value Date     08/16/2021    K 3.9 08/16/2021     08/16/2021    CO2 26 08/16/2021    GLUCOSE 105 08/16/2021    BUN 29 08/16/2021    CREATININE 1.2 08/16/2021    CALCIUM 9.3 08/16/2021    PROT 6.8 08/16/2021    LABALBU 4.1 08/16/2021    BILITOT 1.0 08/16/2021    ALT 36 08/16/2021    AST 20 08/16/2021     Hemoglobin A1C (%)   Date Value   03/10/2015 6.9     LDL Calculated (mg/dL)   Date Value   05/12/2015 90       Lab Results   Component Value Date    WBC 6.4 08/16/2021    NEUTROABS 4.9 08/16/2021    HGB 13.7 08/16/2021    HCT 41.9 08/16/2021    MCV 92.7 08/16/2021     08/16/2021     Lab Results   Component Value Date    TSH 2.31 04/12/2014       ASSESSMENT/PLAN:     1. Cough  - benzonatate (TESSALON) 200 MG capsule; Take 1 capsule by mouth 3 times daily as needed for Cough  Dispense: 30 capsule; Refill: 0  - doxycycline hyclate (VIBRA-TABS) 100 MG tablet; Take 1 tablet by mouth 2 times daily for 7 days  Dispense: 14 tablet; Refill: 0  - pt declines covid, flu, CXR. States he wants an abx. Will tx as if PNA since cannot r/o. Pt instructed if no improvement then to rtc for evaluation. No orders of the defined types were placed in this encounter.        Electronically signed by Maritza Cage on 12/10/2021 at 1:12 PM

## 2021-12-10 NOTE — TELEPHONE ENCOUNTER
PT STATED WAS SEEN IN OFFICE ABOUT 2 WEEKS AGO (ON A Monday) PT STATED HE DID NOT WANT TO COME BACK IN TO THE OFFICE STATED HE JUST WANTED MEDS FOR HIS ILLNESS STATED HE IS HAVING FLU LIKE SYMPTOMS AND SEVERE CONGESTION, FATIGUE, FEVER.   THERE IS NO APPOINTMENT WITH OUR CLINIC ON FILE SINCE 11/16/21  PT ALSO STATED THAT HE HAS BEEN CALLING FOR THIS FOR 2 WEEKS STATED HE CALLED AGAIN YESTERDAY  PT ASKED FOR A CALL BACK

## 2021-12-10 NOTE — TELEPHONE ENCOUNTER
Called pt, verbally informed him to go on to urgent care due to Dr. Vaca being out today and him needing medication, pt verbally agreed.

## 2021-12-14 DIAGNOSIS — I48.21 PERMANENT ATRIAL FIBRILLATION (HCC): Primary | ICD-10-CM

## 2021-12-14 RX ORDER — APIXABAN 5 MG/1
TABLET, FILM COATED ORAL
Qty: 60 TABLET | Refills: 3 | Status: SHIPPED | OUTPATIENT
Start: 2021-12-14 | End: 2022-04-18

## 2022-01-03 RX ORDER — ATENOLOL 100 MG/1
200 TABLET ORAL DAILY
Qty: 180 TABLET | Refills: 3 | Status: SHIPPED | OUTPATIENT
Start: 2022-01-03 | End: 2023-02-10 | Stop reason: SDUPTHER

## 2022-02-14 RX ORDER — ERGOCALCIFEROL 1.25 MG/1
CAPSULE ORAL
Qty: 12 CAPSULE | Refills: 1 | OUTPATIENT
Start: 2022-02-14

## 2022-02-14 RX ORDER — INDOMETHACIN 50 MG/1
CAPSULE ORAL
Qty: 180 CAPSULE | Refills: 3 | Status: SHIPPED | OUTPATIENT
Start: 2022-02-14 | End: 2022-12-27

## 2022-02-14 NOTE — TELEPHONE ENCOUNTER
Rx Refill Note  Requested Prescriptions     Pending Prescriptions Disp Refills   • vitamin D (ERGOCALCIFEROL) 1.25 MG (17618 UT) capsule capsule [Pharmacy Med Name: VIT D2 1.25 MG (50,000 UNIT 1.25 MG Capsule] 12 capsule 1     Sig: TAKE ONE CAPSULE BY MOUTH ONCE WEEKLY   • indomethacin (INDOCIN) 50 MG capsule [Pharmacy Med Name: INDOMETHACIN 50 MG CAPSUL 50 Capsule] 180 capsule 3     Sig: TAKE ONE CAPSULE BY MOUTH TWO TIMES A DAY      Last office visit with prescribing clinician: 11/16/2021      Next office visit with prescribing clinician: 5/16/2022            Marsha Brown MA  02/14/22, 16:56 EST

## 2022-03-11 DIAGNOSIS — I10 ESSENTIAL HYPERTENSION: ICD-10-CM

## 2022-03-11 RX ORDER — LOSARTAN POTASSIUM 100 MG/1
100 TABLET ORAL DAILY
Qty: 90 TABLET | Refills: 3 | Status: SHIPPED | OUTPATIENT
Start: 2022-03-11 | End: 2023-02-10 | Stop reason: SDUPTHER

## 2022-03-11 NOTE — TELEPHONE ENCOUNTER
Rx Refill Note  Requested Prescriptions     Pending Prescriptions Disp Refills   • losartan (COZAAR) 100 MG tablet [Pharmacy Med Name: LOSARTAN POTASSIUM 100 MG T 100 Tablet] 90 tablet 3     Sig: TAKE 1 TABLET BY MOUTH DAILY. INDICATIONS: HIGH BLOOD PRESSURE DISORDER, DIABETES      Last office visit with prescribing clinician: 11/16/2021      Next office visit with prescribing clinician: 5/16/2022            VICENTE GUTIERREZ MA  03/11/22, 11:15 EST

## 2022-03-28 DIAGNOSIS — I10 ESSENTIAL HYPERTENSION: ICD-10-CM

## 2022-03-28 RX ORDER — AMLODIPINE BESYLATE 5 MG/1
5 TABLET ORAL DAILY
Qty: 90 TABLET | Refills: 3 | Status: SHIPPED | OUTPATIENT
Start: 2022-03-28 | End: 2022-11-23

## 2022-03-28 NOTE — TELEPHONE ENCOUNTER
Caller: [facility] [address] - [phone] [facility] [phone] FX    Relationship: Pharmacy    Best call back number: [phone]    Requested Prescriptions:   Requested Prescriptions     Pending Prescriptions Disp Refills   • amLODIPine (NORVASC) 5 MG tablet 90 tablet 3     Sig: Take 1 tablet by mouth Daily. Indications: High Blood Pressure Disorder        Pharmacy where request should be sent: [facility] [address] [facility] - [phone]  - [phone] FX     Additional details provided by patient:  PATIENT HAS DECREASED TO 2/5 MG AND WOULD LIKE PRESCRIPTION FOR THAT AND NOT HAVE TO CUT IT.      Does the patient have less than a 3 day supply:  [] Yes  [x] No    Ashly Augustin   03/28/22 10:46 EDT

## 2022-03-28 NOTE — TELEPHONE ENCOUNTER
Rx Refill Note  Requested Prescriptions     Pending Prescriptions Disp Refills   • amLODIPine (NORVASC) 5 MG tablet 90 tablet 3     Sig: Take 1 tablet by mouth Daily. Indications: High Blood Pressure Disorder      Last office visit with prescribing clinician: 11/16/2021      Next office visit with prescribing clinician: 5/16/2022            Gregoria Lutz LPN  03/28/22, 11:23 EDT

## 2022-04-18 DIAGNOSIS — I48.21 PERMANENT ATRIAL FIBRILLATION: ICD-10-CM

## 2022-04-18 RX ORDER — APIXABAN 5 MG/1
TABLET, FILM COATED ORAL
Qty: 60 TABLET | Refills: 3 | Status: SHIPPED | OUTPATIENT
Start: 2022-04-18 | End: 2022-08-24

## 2022-05-06 DIAGNOSIS — Z79.4 TYPE 2 DIABETES MELLITUS WITH DIABETIC PERIPHERAL ANGIOPATHY WITHOUT GANGRENE, WITH LONG-TERM CURRENT USE OF INSULIN: Primary | ICD-10-CM

## 2022-05-06 DIAGNOSIS — E11.51 TYPE 2 DIABETES MELLITUS WITH DIABETIC PERIPHERAL ANGIOPATHY WITHOUT GANGRENE, WITH LONG-TERM CURRENT USE OF INSULIN: Primary | ICD-10-CM

## 2022-05-06 RX ORDER — GLIPIZIDE 5 MG/1
5 TABLET, FILM COATED, EXTENDED RELEASE ORAL DAILY
Qty: 90 TABLET | Refills: 3 | Status: SHIPPED | OUTPATIENT
Start: 2022-05-06 | End: 2023-02-10 | Stop reason: SDUPTHER

## 2022-05-06 NOTE — TELEPHONE ENCOUNTER
Rx Refill Note  Requested Prescriptions     Pending Prescriptions Disp Refills   • glipizide (GLUCOTROL XL) 5 MG ER tablet [Pharmacy Med Name: GLIPIZIDE ER 5 MG TB24 5 Tablet] 90 tablet 3     Sig: TAKE 1 TABLET BY MOUTH DAILY.      Last office visit with prescribing clinician: 11/16/2021      Next office visit with prescribing clinician: 5/16/2022            VICENTE GUTIERREZ MA  05/06/22, 12:52 EDT

## 2022-05-10 ENCOUNTER — HOSPITAL ENCOUNTER (EMERGENCY)
Facility: HOSPITAL | Age: 77
Discharge: ANOTHER HOSPITAL | End: 2022-05-10
Attending: EMERGENCY MEDICINE | Admitting: EMERGENCY MEDICINE

## 2022-05-10 ENCOUNTER — APPOINTMENT (OUTPATIENT)
Dept: CT IMAGING | Facility: HOSPITAL | Age: 77
End: 2022-05-10

## 2022-05-10 VITALS
HEART RATE: 101 BPM | WEIGHT: 270 LBS | BODY MASS INDEX: 35.78 KG/M2 | OXYGEN SATURATION: 97 % | HEIGHT: 73 IN | RESPIRATION RATE: 21 BRPM | DIASTOLIC BLOOD PRESSURE: 82 MMHG | TEMPERATURE: 98 F | SYSTOLIC BLOOD PRESSURE: 137 MMHG

## 2022-05-10 DIAGNOSIS — I71.10 RUPTURED THORACIC AORTIC ANEURYSM: Primary | ICD-10-CM

## 2022-05-10 DIAGNOSIS — J94.2 HEMOTHORAX: ICD-10-CM

## 2022-05-10 DIAGNOSIS — I48.91 ATRIAL FIBRILLATION WITH RVR: ICD-10-CM

## 2022-05-10 LAB
ABO GROUP BLD: NORMAL
ABO GROUP BLD: NORMAL
ALBUMIN SERPL-MCNC: 3.4 G/DL (ref 3.5–5.2)
ALBUMIN/GLOB SERPL: 1.6 G/DL
ALP SERPL-CCNC: 63 U/L (ref 39–117)
ALT SERPL W P-5'-P-CCNC: 10 U/L (ref 1–41)
ANION GAP SERPL CALCULATED.3IONS-SCNC: 12.1 MMOL/L (ref 5–15)
AST SERPL-CCNC: 11 U/L (ref 1–40)
BASOPHILS # BLD AUTO: 0.03 10*3/MM3 (ref 0–0.2)
BASOPHILS NFR BLD AUTO: 0.2 % (ref 0–1.5)
BILIRUB SERPL-MCNC: 1.3 MG/DL (ref 0–1.2)
BLD GP AB SCN SERPL QL: NEGATIVE
BUN SERPL-MCNC: 32 MG/DL (ref 8–23)
BUN/CREAT SERPL: 22.7 (ref 7–25)
CALCIUM SPEC-SCNC: 8.9 MG/DL (ref 8.6–10.5)
CHLORIDE SERPL-SCNC: 102 MMOL/L (ref 98–107)
CO2 SERPL-SCNC: 22.9 MMOL/L (ref 22–29)
CREAT SERPL-MCNC: 1.41 MG/DL (ref 0.76–1.27)
D-LACTATE SERPL-SCNC: 3.8 MMOL/L (ref 0.5–2)
DEPRECATED RDW RBC AUTO: 49 FL (ref 37–54)
EGFRCR SERPLBLD CKD-EPI 2021: 51.6 ML/MIN/1.73
EOSINOPHIL # BLD AUTO: 0.17 10*3/MM3 (ref 0–0.4)
EOSINOPHIL NFR BLD AUTO: 1.2 % (ref 0.3–6.2)
ERYTHROCYTE [DISTWIDTH] IN BLOOD BY AUTOMATED COUNT: 13.8 % (ref 12.3–15.4)
GLOBULIN UR ELPH-MCNC: 2.1 GM/DL
GLUCOSE SERPL-MCNC: 318 MG/DL (ref 65–99)
HCT VFR BLD AUTO: 39.9 % (ref 37.5–51)
HGB BLD-MCNC: 13 G/DL (ref 13–17.7)
IMM GRANULOCYTES # BLD AUTO: 0.14 10*3/MM3 (ref 0–0.05)
IMM GRANULOCYTES NFR BLD AUTO: 1 % (ref 0–0.5)
LYMPHOCYTES # BLD AUTO: 2.48 10*3/MM3 (ref 0.7–3.1)
LYMPHOCYTES NFR BLD AUTO: 17.3 % (ref 19.6–45.3)
MCH RBC QN AUTO: 31.4 PG (ref 26.6–33)
MCHC RBC AUTO-ENTMCNC: 32.6 G/DL (ref 31.5–35.7)
MCV RBC AUTO: 96.4 FL (ref 79–97)
MONOCYTES # BLD AUTO: 1.22 10*3/MM3 (ref 0.1–0.9)
MONOCYTES NFR BLD AUTO: 8.5 % (ref 5–12)
NEUTROPHILS NFR BLD AUTO: 10.3 10*3/MM3 (ref 1.7–7)
NEUTROPHILS NFR BLD AUTO: 71.8 % (ref 42.7–76)
NRBC BLD AUTO-RTO: 0 /100 WBC (ref 0–0.2)
PLATELET # BLD AUTO: 230 10*3/MM3 (ref 140–450)
PMV BLD AUTO: 11 FL (ref 6–12)
POTASSIUM SERPL-SCNC: 3.7 MMOL/L (ref 3.5–5.2)
PROT SERPL-MCNC: 5.5 G/DL (ref 6–8.5)
RBC # BLD AUTO: 4.14 10*6/MM3 (ref 4.14–5.8)
RH BLD: POSITIVE
RH BLD: POSITIVE
SODIUM SERPL-SCNC: 137 MMOL/L (ref 136–145)
T&S EXPIRATION DATE: NORMAL
WBC NRBC COR # BLD: 14.34 10*3/MM3 (ref 3.4–10.8)

## 2022-05-10 PROCEDURE — 86901 BLOOD TYPING SEROLOGIC RH(D): CPT

## 2022-05-10 PROCEDURE — 25010000002 PROTHROMBIN COMPLEX CONC HUMAN 500 UNITS KIT: Performed by: EMERGENCY MEDICINE

## 2022-05-10 PROCEDURE — 96374 THER/PROPH/DIAG INJ IV PUSH: CPT

## 2022-05-10 PROCEDURE — 36430 TRANSFUSION BLD/BLD COMPNT: CPT

## 2022-05-10 PROCEDURE — 86900 BLOOD TYPING SEROLOGIC ABO: CPT

## 2022-05-10 PROCEDURE — 25010000002 IOPAMIDOL 61 % SOLUTION: Performed by: EMERGENCY MEDICINE

## 2022-05-10 PROCEDURE — 85025 COMPLETE CBC W/AUTO DIFF WBC: CPT | Performed by: EMERGENCY MEDICINE

## 2022-05-10 PROCEDURE — 83605 ASSAY OF LACTIC ACID: CPT | Performed by: EMERGENCY MEDICINE

## 2022-05-10 PROCEDURE — 93005 ELECTROCARDIOGRAM TRACING: CPT | Performed by: EMERGENCY MEDICINE

## 2022-05-10 PROCEDURE — P9016 RBC LEUKOCYTES REDUCED: HCPCS

## 2022-05-10 PROCEDURE — 74175 CTA ABDOMEN W/CONTRAST: CPT

## 2022-05-10 PROCEDURE — 99291 CRITICAL CARE FIRST HOUR: CPT

## 2022-05-10 PROCEDURE — 86901 BLOOD TYPING SEROLOGIC RH(D): CPT | Performed by: EMERGENCY MEDICINE

## 2022-05-10 PROCEDURE — 86850 RBC ANTIBODY SCREEN: CPT | Performed by: EMERGENCY MEDICINE

## 2022-05-10 PROCEDURE — 86920 COMPATIBILITY TEST SPIN: CPT

## 2022-05-10 PROCEDURE — 80053 COMPREHEN METABOLIC PANEL: CPT | Performed by: EMERGENCY MEDICINE

## 2022-05-10 PROCEDURE — 86900 BLOOD TYPING SEROLOGIC ABO: CPT | Performed by: EMERGENCY MEDICINE

## 2022-05-10 RX ADMIN — IOPAMIDOL 100 ML: 612 INJECTION, SOLUTION INTRAVENOUS at 05:44

## 2022-05-10 RX ADMIN — PROTHROMBIN, COAGULATION FACTOR VII HUMAN, COAGULATION FACTOR IX HUMAN, COAGULATION FACTOR X HUMAN, PROTEIN C, PROTEIN S HUMAN, AND WATER 4880 UNITS: KIT at 06:23

## 2022-05-10 NOTE — ED NOTES
Bag #4 emergent unit PRBC initiated at this time. Pt getting loaded onto stretcher for emergent transfer to  at this time

## 2022-05-10 NOTE — ED PROVIDER NOTES
Subjective   77-year-old male presents to the ED with a chief complaint of chest pain.  Patient states that he has had some left-sided chest discomfort for the last 2 to 3 days.  Got much more severe tonight.  Sharp stabbing pain in his left lateral chest.  Worse with deep breathing.  Patient states that he feels diaphoretic and short of breath.  Feels lightheaded and weak.  Patient has a history of ascending aortic aneurysm and descending thoracic aortic aneurysm and abdominal aortic aneurysm.  Further history review of systems limited secondary to patient's clinical status as he presented to the ED hypotensive diaphoretic ill-appearing.          Review of Systems   Respiratory: Positive for shortness of breath.    Cardiovascular: Positive for chest pain.   Neurological: Positive for weakness and light-headedness.   All other systems reviewed and are negative.      Past Medical History:   Diagnosis Date   • Aneurysm (HCC)     aortic aneyrtysm ascending and transverse still has desending  watched by dr holloway at    • Arthritis    • Diabetes mellitus ([facility])    • Elevated cholesterol    • GERD (gastroesophageal reflux disease)    • Gout    • Heart disease    • Hyperlipidemia    • Hypertension    • Temporal arteritis ([facility])    • TIA (transient ischemic attack)     3 years ago   • Vocal cord paralysis     left during open heart surgery       Allergies   Allergen Reactions   • Ampicillin Rash   • Lisinopril Unknown (See Comments)     Pt does not know--says wife knows     • Niacin And Related Rash   • Norco [Hydrocodone-Acetaminophen] Itching   • Penicillins Rash       Past Surgical History:   Procedure Laterality Date   • APPENDECTOMY     • ARTERIAL ANEURYSM REPAIR     • CHOLECYSTECTOMY  2001   • HERNIA REPAIR  1987   • [place] [facility] [place] Right    • ORIF WRIST FRACTURE Left 6/27/2018    Procedure: OPEN REDUCTION INTERNAL FIXATION LEFT WRIST;  Surgeon: Narinder Lewis MD;   Location: [facility];  Service: Orthopedics   • THROAT SURGERY  2015       Family History   Problem Relation Age of Onset   • Diabetes Maternal Grandmother    • Hypertension Other        Social History     Socioeconomic History   • Marital status:    Tobacco Use   • Smoking status: Former Smoker     Packs/day: 0.50     Years: 1.00     Pack years: 0.50     Types: Cigarettes     Quit date: 1967     Years since quittin.3   • Smokeless tobacco: Never Used   Substance and Sexual Activity   • Alcohol use: No   • Drug use: No   • Sexual activity: Defer           Objective   Physical Exam  Vitals and nursing note reviewed.   Constitutional:       General: He is not in acute distress.     Appearance: He is well-developed. He is ill-appearing and diaphoretic.   HENT:      Head: Normocephalic and atraumatic.      Nose: Nose normal.   Eyes:      Conjunctiva/sclera: Conjunctivae normal.      Pupils: Pupils are equal, round, and reactive to light.   Cardiovascular:      Rate and Rhythm: Tachycardia present. Rhythm irregular.      Comments: Atrial fibrillation on the monitor  Decreased radial pulses bilaterally  Pulmonary:      Effort: No respiratory distress.      Comments: Tachypneic.  Coarse breath sounds bilaterally.  Abdominal:      General: There is no distension.      Palpations: Abdomen is soft.      Tenderness: There is no abdominal tenderness.   Musculoskeletal:         General: No deformity.   Skin:     Coloration: Skin is pale.      Comments: Pale conjunctiva and lips, profusely diaphoretic   Neurological:      Mental Status: He is alert and oriented to person, place, and time.      Cranial Nerves: No cranial nerve deficit.      Coordination: Coordination normal.         Procedures           ED Course                EKG interpreted by me.  Atrial fibrillation with rapid ventricular response.  Rate of 112.  Nonspecific ST segment changes.  Abnormal EKG                         PULSE OXIMETRY  INTERPRETATION  Patient had a pulse ox of 82% on room air. This is a abnormal/hypoxic pulse oximetry reading.        [facility]  Critical Care  Performed by: Nba Salgado DO  Authorized by: Nba Salgado DO     Critical care provider statement:     Critical care time (minutes): 65    Critical care time was exclusive of:  Separately billable procedures and treating other patients    Critical care was necessary to treat or prevent imminent or life-threatening deterioration of the following conditions: Acute descending thoracic aortic aneurysm rupture    Critical care was time spent personally by me on the following activities:  Ordering and performing treatments and interventions, development of treatment plan with patient or surrogate, discussions with consultants, evaluation of patient's response to treatment, examination of patient, ordering and review of laboratory studies, ordering and review of radiographic studies, pulse oximetry, re-evaluation of patient's condition and review of old charts          77-year-old male with history of ascending, descending and abdominal aortic aneurysms presents to the ED with sharp stabbing left-sided chest pain.  On immediate initial evaluation the patient is ill-appearing, diaphoretic, pale and sleepy.  Initial blood pressure patient was hypotensive.  Initial rate on the monitor appeared to be A. fib but RVR.  Immediate concern for ruptured aneurysm patient was taken immediately to CT for CTA.  Immediately called for 2 units of uncrossed match blood.  Based on my interpretation of the immediate available images it appeared that the patient had a ruptured descending thoracic aortic aneurysm with hemothorax.  Immediately discussed with [facility] CT surgery.  Continue to transfuse 2 units PRBCs, ordered Kcentra secondary to patient taking Eliquis.  Patient hypotension improved slightly status post first 2 units.  We will continue to transfuse the other  2 units.  Patient was accepted by Dr. Elder and transferred to the [facility] to the ED for immediate evaluation and or operative management.  Patient has high risk of decompensation and/or death in route.  The patient understands these risks and is agreeable to transfer as it is his only current option for definitive treatment of his life-threatening condition.          Final diagnoses:   Ruptured thoracic aortic aneurysm (HCC)   Atrial fibrillation with RVR (HCC)   Hemothorax       ED Disposition  ED Disposition     ED Disposition   Transfer to Another Facility     Condition   --    Comment   --             No follow-up provider specified.       Medication List      No changes were made to your prescriptions during this visit.          Nba Salgado,   05/10/22 0648       Nba Salgado,   05/10/22 0657

## 2022-05-16 ENCOUNTER — TELEPHONE (OUTPATIENT)
Dept: INTERNAL MEDICINE | Facility: CLINIC | Age: 77
End: 2022-05-16

## 2022-05-16 NOTE — TELEPHONE ENCOUNTER
Pt is scheduled to see me today for a follow up appointment but he was admitted last week to  for an aneurysm rupture. Please call to see how he's doing. I think he's still admitted, and we need to cancel his appointment today. I was going to cancel it, but I cannot see any updated [facility] notes, may have been discharged? I didn't want him coming in for a hospital follow up and we'd cancelled the appointment. If he's discharged and wants to keep this appointment he's welcome to do so. If still admitted, though, please cancel today (so he isn't no Showed).    Thank you.

## 2022-05-18 ENCOUNTER — TELEPHONE (OUTPATIENT)
Dept: INTERNAL MEDICINE | Facility: CLINIC | Age: 77
End: 2022-05-18

## 2022-05-18 NOTE — TELEPHONE ENCOUNTER
Pt discharged from . Please see if he wants to do a hospital follow up visit sooner than 6/1 (that's almost out of VIVIANE range). Will also need VIVIANE phone call.    Can book with me in any open 30 min spot (procedure spot, new peds spot).

## 2022-05-25 ENCOUNTER — OFFICE VISIT (OUTPATIENT)
Dept: INTERNAL MEDICINE | Facility: CLINIC | Age: 77
End: 2022-05-25

## 2022-05-25 VITALS
SYSTOLIC BLOOD PRESSURE: 120 MMHG | BODY MASS INDEX: 35.78 KG/M2 | OXYGEN SATURATION: 98 % | HEART RATE: 75 BPM | TEMPERATURE: 97.5 F | HEIGHT: 73 IN | RESPIRATION RATE: 18 BRPM | WEIGHT: 270 LBS | DIASTOLIC BLOOD PRESSURE: 72 MMHG

## 2022-05-25 DIAGNOSIS — E11.51 TYPE 2 DIABETES MELLITUS WITH DIABETIC PERIPHERAL ANGIOPATHY WITHOUT GANGRENE, WITH LONG-TERM CURRENT USE OF INSULIN: ICD-10-CM

## 2022-05-25 DIAGNOSIS — G89.18 POSTOPERATIVE PAIN: Primary | ICD-10-CM

## 2022-05-25 DIAGNOSIS — Z79.4 TYPE 2 DIABETES MELLITUS WITH DIABETIC PERIPHERAL ANGIOPATHY WITHOUT GANGRENE, WITH LONG-TERM CURRENT USE OF INSULIN: ICD-10-CM

## 2022-05-25 DIAGNOSIS — I71.10 RUPTURED ANEURYSM OF THORACIC AORTA: ICD-10-CM

## 2022-05-25 DIAGNOSIS — Z09 HOSPITAL DISCHARGE FOLLOW-UP: ICD-10-CM

## 2022-05-25 PROCEDURE — 99214 OFFICE O/P EST MOD 30 MIN: CPT | Performed by: FAMILY MEDICINE

## 2022-05-25 RX ORDER — ACETAMINOPHEN 500 MG
500 TABLET ORAL EVERY 6 HOURS PRN
COMMUNITY
Start: 2022-05-18 | End: 2022-05-28

## 2022-05-25 RX ORDER — GABAPENTIN 300 MG/1
300 CAPSULE ORAL 3 TIMES DAILY PRN
Qty: 90 CAPSULE | Refills: 1 | Status: SHIPPED | OUTPATIENT
Start: 2022-05-25 | End: 2022-06-24

## 2022-05-25 RX ORDER — OXYCODONE HYDROCHLORIDE AND ACETAMINOPHEN 5; 325 MG/1; MG/1
1 TABLET ORAL EVERY 6 HOURS PRN
Qty: 30 TABLET | Refills: 0 | Status: SHIPPED | OUTPATIENT
Start: 2022-05-25 | End: 2023-02-10

## 2022-05-25 RX ORDER — AMLODIPINE BESYLATE 10 MG/1
10 TABLET ORAL DAILY
COMMUNITY
Start: 2022-05-19 | End: 2023-05-19

## 2022-05-25 NOTE — PROGRESS NOTES
05/25/2022      Assessment & Plan    Problem List Items Addressed This Visit        Cardiac and Vasculature    Ruptured aneurysm of thoracic aorta (HCC)    Overview     · Followed by vascular surgery at  (Dr. Mosley)  · S/p repair of ascending aortic and great artery aneurysms  · Thoracic aorta noted to be aneurysmal at isthmus and proximal descending areas (CTA chest 11/18/19)  · Rupture 5/10/22 s/p repair at               Endocrine and Metabolic    Type 2 diabetes mellitus with circulatory disorder, with long-term current use of insulin (HCC)    Overview     · Associated with hypertension, hyperlipidemia              Other Visit Diagnoses     Postoperative pain    -  Primary    Relevant Medications    gabapentin (NEURONTIN) 300 MG capsule    oxyCODONE-acetaminophen (Percocet) 5-325 MG per tablet    Hospital discharge follow-up            Use opiate sparingly. Can be habit forming. Watch for constipation. Much of his pain from chest tube is likely neuropathic, discussed gabapentin. meds can be sedating. Take gabapentin as needed, if not helping at this dose may ask me for increase.     Discussed A1C. Transfusion will falsely lower A1C. Will take 3 months for donor blood to be degraded. Another three months before his A1C will be true. Defer checking A1C again until follow up in six months. Monitor glucose levels.     Return to clinic sooner than scheduled awv if any changes/concerns.    Contact specialists at  regarding follow up appts. Typically they remove the sutures for chest tubes, defer to them.       Return in about 6 months (around 11/25/2022) for Medicare Wellness, Diabetes follow up.      Subjective      Bryan Roman is a 77 y.o. male here for:  Chief Complaint   Patient presents with   • Hospital Follow Up Visit            History per MA reviewed.    Patient was admitted at  for repair of ruptured thoracic aortic aneurysm and also required decortication of lung. Awoke with back pain and was  "aware to watch for this per vascular surgeon, known aneurysms. He called 911 and was taken to [facility], aneurysm rupture quickly appreciated by physician and he was transferred to . Underwent emergent repair followed a couple of days later by thoracoscopic decortication.     Patient feels james to be here. Pleased with care it sounds with [facility] and . Continues to have pain in chest wall left from chest tube, limiting ability to sleep. Stitches in place, some confusion on who is to remove and when.     Received blood transfusion. Diabetic--will affect A1C levels.         The following portions of the patient's history were reviewed and updated as appropriate: allergies, current medications, past family history, past medical history, past social history, past surgical history and problem list.    Review of Systems   Constitutional: Positive for fatigue. Negative for fever.   Musculoskeletal: Positive for arthralgias.   Psychiatric/Behavioral: Positive for sleep disturbance and stress.         Objective   Visit Vitals  /72 (BP Location: Left arm, Patient Position: Sitting, Cuff Size: Adult)   Pulse 75   Temp 97.5 °F (36.4 °C) (Temporal)   Resp 18   Ht 185.4 cm (73\")   Wt 122 kg (270 lb)   SpO2 98%   BMI 35.62 kg/m²       Physical Exam  Vitals and nursing note reviewed.   Constitutional:       General: He is not in acute distress.     Appearance: Normal appearance. He is well-developed and well-groomed. He is obese. He is not ill-appearing, toxic-appearing or diaphoretic.      Interventions: Face mask in place.   HENT:      Head: Normocephalic and atraumatic.      Right Ear: Hearing normal.      Left Ear: Hearing normal.   Eyes:      General: Lids are normal. No scleral icterus.        Right eye: No discharge.         Left eye: No discharge.      Extraocular Movements: Extraocular movements intact.   Neck:      Comments: Voice is somewhat hoarse, not far from baseline for " patient  Pulmonary:      Effort: Pulmonary effort is normal.   Chest:      Comments: Two thick dark sutures on left flank from chest tube. Wound is clean, dry and intact.  Musculoskeletal:      Cervical back: Neck supple.   Skin:     Coloration: Skin is not jaundiced or pale.   Neurological:      General: No focal deficit present.      Mental Status: He is alert and oriented to person, place, and time.   Psychiatric:         Attention and Perception: Attention and perception normal.         Mood and Affect: Mood and affect normal.         Speech: Speech normal.         Behavior: Behavior normal. Behavior is cooperative.         Thought Content: Thought content normal.         Cognition and Memory: Cognition and memory normal.         Judgment: Judgment normal.           For medical decision making review of the following was required:  Reviewed [facility] notes via care everywhere.   A1C was noted to be 6.6 but transfusion will falsely lower.    I spent 31 minutes caring for Bryan Roman on this date of service. This time includes time spent by me in the following activities: preparing for the visit, reviewing tests, performing a medically appropriate examination and/or evaluation, counseling and educating the patient/family/caregiver, ordering medications, tests, or procedures and documenting information in the medical record.       Génesis Vaca MD

## 2022-06-09 PROCEDURE — P9016 RBC LEUKOCYTES REDUCED: HCPCS

## 2022-06-09 PROCEDURE — 36430 TRANSFUSION BLD/BLD COMPNT: CPT

## 2022-06-09 PROCEDURE — 86900 BLOOD TYPING SEROLOGIC ABO: CPT

## 2022-06-16 ENCOUNTER — TELEPHONE (OUTPATIENT)
Dept: INTERNAL MEDICINE | Facility: CLINIC | Age: 77
End: 2022-06-16

## 2022-06-16 DIAGNOSIS — G89.18 POSTOPERATIVE PAIN: Primary | ICD-10-CM

## 2022-06-16 DIAGNOSIS — M54.6 THORACIC BACK PAIN, UNSPECIFIED BACK PAIN LATERALITY, UNSPECIFIED CHRONICITY: ICD-10-CM

## 2022-06-21 NOTE — PROGRESS NOTES
Chief Complaint  Benign Prostatic Hypertrophy (yearly fup.)        PHIL Roman is a 72 y.o. male who returns for routine annual checkup for the first time in a few years having been sidetracked with open heart surgery repair of aneurysms vocal cord paralysis and other medical problems.  He had a TURP about 10 years ago and has had no trouble voiding since.  His family history is negative for prostate cancer.  Currently describes an AUA symptom index of 3.    Vitals:    11/07/17 1345   BP: 136/77   Pulse: 62   Resp: 16   Temp: 98.8 °F (37.1 °C)   SpO2: 97%       Past Medical History  Past Medical History:   Diagnosis Date   • Diabetes mellitus    • GERD (gastroesophageal reflux disease)    • Hyperlipidemia    • Hypertension        Past Surgical History  Past Surgical History:   Procedure Laterality Date   • APPENDECTOMY     • ARTERIAL ANEURYSM REPAIR     • GALLBLADDER SURGERY     • HERNIA REPAIR  1987   • KNEE SURGERY Right        Medications  has a current medication list which includes the following prescription(s): atenolol, atorvastatin, glipizide, hydrochlorothiazide, lisinopril, metformin, ranitidine, and truetest test.      Allergies  Allergies   Allergen Reactions   • Ampicillin Rash   • Niacin And Related Rash       Social History  Social History     Social History Narrative       Family History  He has no family history of bladder or kidney cancer  He has no family history of kidney stones      AUA Symptom Score:      Review of Systems  Review of Systems    Physical Exam  Physical Exam   Constitutional: He is oriented to person, place, and time. He appears well-developed and well-nourished.   HENT:   Head: Normocephalic and atraumatic.   Neck: Normal range of motion.   Pulmonary/Chest: Effort normal. No respiratory distress.   Abdominal: Soft. He exhibits no distension and no mass. There is no tenderness. No hernia.   Genitourinary: Rectum normal and prostate normal.   Musculoskeletal: Normal range of  motion.   Lymphadenopathy:     He has no cervical adenopathy.   Neurological: He is alert and oriented to person, place, and time.   Skin: Skin is warm and dry.   Psychiatric: He has a normal mood and affect. His behavior is normal.   Vitals reviewed.      Labs Recent and today in the office:  Results for orders placed or performed in visit on 11/07/17   POC Urinalysis Dipstick, Automated   Result Value Ref Range    Color Yellow Yellow, Straw, Dark Yellow, Phyllis    Clarity, UA Clear Clear    Glucose, UA Negative Negative, 1000 mg/dL (3+) mg/dL    Bilirubin Negative Negative    Ketones, UA Negative Negative    Specific Gravity  1.025 1.005 - 1.030    Blood, UA Negative Negative    pH, Urine 6.0 5.0 - 8.0    Protein, POC Negative Negative mg/dL    Urobilinogen, UA Normal Normal    Leukocytes Negative Negative    Nitrite, UA Negative Negative         Assessment & Plan  BPH: Currently he is voiding without difficulty and has a benign prostate on digital rectal exam.  PSA is obtained and if normal he can return on an annual basis.  I have recommended a heart healthy diet to reduce his risk of getting prostate cancer.   Thalidomide Pregnancy And Lactation Text: This medication is Pregnancy Category X and is absolutely contraindicated during pregnancy. It is unknown if it is excreted in breast milk.

## 2022-06-24 DIAGNOSIS — G89.18 POSTOPERATIVE PAIN: ICD-10-CM

## 2022-06-24 RX ORDER — GABAPENTIN 300 MG/1
CAPSULE ORAL
Qty: 90 CAPSULE | Refills: 1 | Status: SHIPPED | OUTPATIENT
Start: 2022-06-24 | End: 2022-11-23

## 2022-06-24 NOTE — TELEPHONE ENCOUNTER
Rx Refill Note  Requested Prescriptions     Pending Prescriptions Disp Refills   • gabapentin (NEURONTIN) 300 MG capsule [Pharmacy Med Name: GABAPENTIN 300 MG CAPS 300 Capsule] 90 capsule 1     Sig: TAKE ONE CAPSULE BY MOUTH THREE TIMES A DAY AS NEEDED FOR NERVE PAIN      Last office visit with prescribing clinician: 5/25/2022      Next office visit with prescribing clinician: 11/29/2022            ARIEL DENIS MA  06/24/22, 15:46 EDT

## 2022-07-21 RX ORDER — AMLODIPINE BESYLATE 10 MG/1
TABLET ORAL
Qty: 90 TABLET | Refills: 3 | OUTPATIENT
Start: 2022-07-21

## 2022-07-21 NOTE — TELEPHONE ENCOUNTER
Rx Refill Note  Requested Prescriptions     Pending Prescriptions Disp Refills   • amLODIPine (NORVASC) 10 MG tablet [Pharmacy Med Name: AMLODIPINE BESYLATE 10 MG T 10 Tablet] 90 tablet 3     Sig: TAKE 1 TABLET BY MOUTH DAILY. INDICATIONS: HIGH BLOOD PRESSURE DISORDER      Last office visit with prescribing clinician: 5/25/2022      Next office visit with prescribing clinician: 11/29/2022            ARIEL DENIS MA  07/21/22, 18:02 EDT

## 2022-08-24 DIAGNOSIS — I48.21 PERMANENT ATRIAL FIBRILLATION: ICD-10-CM

## 2022-08-24 RX ORDER — APIXABAN 5 MG/1
TABLET, FILM COATED ORAL
Qty: 60 TABLET | Refills: 11 | Status: SHIPPED | OUTPATIENT
Start: 2022-08-24

## 2022-10-25 ENCOUNTER — TELEPHONE (OUTPATIENT)
Dept: CARDIOLOGY | Facility: CLINIC | Age: 77
End: 2022-10-25

## 2022-10-25 NOTE — TELEPHONE ENCOUNTER
Pt called office stating he has been having ongoing FRANCIS and dizziness for a month. Patient's b/p was 164/54 and HR was 75 while on the phone. Patient had a f/u scheduled for 12/6/22 and I moved it to Dr. Cooks first available. Advised that if patient's symptoms worsen to go to the ER.

## 2022-11-23 ENCOUNTER — OFFICE VISIT (OUTPATIENT)
Dept: CARDIOLOGY | Facility: CLINIC | Age: 77
End: 2022-11-23

## 2022-11-23 VITALS
SYSTOLIC BLOOD PRESSURE: 180 MMHG | HEART RATE: 75 BPM | DIASTOLIC BLOOD PRESSURE: 90 MMHG | BODY MASS INDEX: 34.06 KG/M2 | WEIGHT: 257 LBS | HEIGHT: 73 IN | RESPIRATION RATE: 18 BRPM | OXYGEN SATURATION: 98 %

## 2022-11-23 DIAGNOSIS — Z79.4 TYPE 2 DIABETES MELLITUS WITH DIABETIC PERIPHERAL ANGIOPATHY WITHOUT GANGRENE, WITH LONG-TERM CURRENT USE OF INSULIN: ICD-10-CM

## 2022-11-23 DIAGNOSIS — I71.10 RUPTURED ANEURYSM OF THORACIC AORTA, UNSPECIFIED PART: ICD-10-CM

## 2022-11-23 DIAGNOSIS — E11.51 TYPE 2 DIABETES MELLITUS WITH DIABETIC PERIPHERAL ANGIOPATHY WITHOUT GANGRENE, WITH LONG-TERM CURRENT USE OF INSULIN: ICD-10-CM

## 2022-11-23 DIAGNOSIS — I10 ESSENTIAL HYPERTENSION: ICD-10-CM

## 2022-11-23 DIAGNOSIS — I48.21 PERMANENT ATRIAL FIBRILLATION: Primary | ICD-10-CM

## 2022-11-23 DIAGNOSIS — E78.5 HYPERLIPIDEMIA LDL GOAL <100: ICD-10-CM

## 2022-11-23 DIAGNOSIS — E66.2 CLASS 1 OBESITY WITH ALVEOLAR HYPOVENTILATION WITHOUT SERIOUS COMORBIDITY WITH BODY MASS INDEX (BMI) OF 33.0 TO 33.9 IN ADULT: ICD-10-CM

## 2022-11-23 PROCEDURE — 93000 ELECTROCARDIOGRAM COMPLETE: CPT | Performed by: INTERNAL MEDICINE

## 2022-11-23 PROCEDURE — 99214 OFFICE O/P EST MOD 30 MIN: CPT | Performed by: INTERNAL MEDICINE

## 2022-11-23 RX ORDER — AZITHROMYCIN 250 MG/1
TABLET, FILM COATED ORAL
COMMUNITY
Start: 2022-11-20 | End: 2023-02-10

## 2022-11-23 NOTE — PROGRESS NOTES
[facility] Office Follow Up Note    Bryan Roman  [id number]  2022    Primary Care Provider: Génesis Vaca MD    Chief Complaint: Dizziness and lightheadedness    History of Present Illness:   Mr. Bryan Roman is a 77 y.o. male who presents to the Cardiology Clinic for routine follow-up.  The patient has a past medical history significant for type 2 diabetes mellitus, hypercholesterolemia, GERD, gout, and obesity.  He has a past vascular history significant for ascending aortic aneurysm status post repair in 9/15.  Since his last follow-up, the patient had a rupture of his remaining thoracic aneurysm in .  He was emergently transferred to [facility] where he went emergent endovascular repair.  His rupture was subsequently complicated by left hemothorax, which eventually required VATS and decortication.  He presents today for routine follow-up.  Since his discharge from the hospital, the patient initially had significant orthostatic symptoms with dizziness and lightheadedness.  Over the past 1-2 months, he has had gradual improvement in his orthostatic symptoms.  He does occasionally have mild dizziness and lightheadedness when rising from a sitting to standing position.  He has not had any presyncopal or syncopal events.  He currently denies any palpitations or symptoms related to his atrial fibrillation.  No other specific complaints today.    Past Cardiac Testin. Last Coronary Angio: None  2. Prior Stress Testing: GXT 9/15              1.  No evidence of inducible ischemia  3. Last Echo:               1.  Normal left ventricular size and systolic function, LVEF 55-60              2.  Normal diastolic filling pattern              3.  Mild to moderate concentric LVH.              4.  Mild right ventricular dilation with normal RV systolic function.              5.  Moderate aortic regurgitation              6.  Mild tricuspid  regurgitation.  4. Prior Holter Monitor: None    Review of Systems:   Review of Systems   Constitutional: Negative for activity change, appetite change, chills, diaphoresis, fatigue, fever, unexpected weight gain and unexpected weight loss.   HENT:        Hoarseness of voice   Eyes: Negative for blurred vision and double vision.   Respiratory: Positive for cough. Negative for chest tightness, shortness of breath and wheezing.    Cardiovascular: Negative for chest pain, palpitations and leg swelling.   Gastrointestinal: Negative for abdominal pain, anal bleeding, blood in stool and GERD.   Endocrine: Negative for cold intolerance and heat intolerance.   Genitourinary: Negative for hematuria.   Neurological: Positive for dizziness and light-headedness. Negative for syncope and weakness.   Hematological: Does not bruise/bleed easily.   Psychiatric/Behavioral: Negative for depressed mood and stress. The patient is not nervous/anxious.        I have reviewed and/or updated the patient's past medical, past surgical, family, social history, problem list and allergies as appropriate.     Medications:     Current Outpatient Medications:   •  amLODIPine (NORVASC) 10 MG tablet, Take 10 mg by mouth Daily., Disp: , Rfl:   •  atenolol (TENORMIN) 100 MG tablet, TAKE 2 TABLETS BY MOUTH DAILY., Disp: 180 tablet, Rfl: 3  •  azithromycin (ZITHROMAX) 250 MG tablet, , Disp: , Rfl:   •  Cholecalciferol (Vitamin D3) 1.25 MG (73327 UT) capsule, Take 1 capsule by mouth Every 7 (Seven) Days., Disp: 12 capsule, Rfl: 1  •  Eliquis 5 MG tablet tablet, TAKE 1 TABLET BY MOUTH EVERY 12 (TWELVE) HOURS., Disp: 60 tablet, Rfl: 11  •  glipizide (GLUCOTROL XL) 5 MG ER tablet, TAKE 1 TABLET BY MOUTH DAILY., Disp: 90 tablet, Rfl: 3  •  hydroCHLOROthiazide (HYDRODIURIL) 25 MG tablet, TAKE 1 TABLET BY MOUTH DAILY. INDICATIONS: HIGH BLOOD PRESSURE DISORDER, Disp: 90 tablet, Rfl: 3  •  indomethacin (INDOCIN) 50 MG capsule, TAKE ONE CAPSULE BY MOUTH TWO TIMES  "A DAY, Disp: 180 capsule, Rfl: 3  •  losartan (COZAAR) 100 MG tablet, TAKE 1 TABLET BY MOUTH DAILY. INDICATIONS: HIGH BLOOD PRESSURE DISORDER, DIABETES, Disp: 90 tablet, Rfl: 3  •  mupirocin (BACTROBAN) 2 % ointment, , Disp: , Rfl:   •  oxyCODONE-acetaminophen (Percocet) 5-325 MG per tablet, Take 1 tablet by mouth Every 6 (Six) Hours As Needed for Severe Pain ., Disp: 30 tablet, Rfl: 0  •  Red Yeast Rice 600 MG capsule, Take  by mouth., Disp: , Rfl:   •  Lancets misc, , Disp: , Rfl:     Physical Exam:  Vital Signs:   Vitals:    11/23/22 0835   BP: 180/90   BP Location: Left arm   Patient Position: Lying   Pulse: 75   Resp: 18   SpO2: 98%   Weight: 117 kg (257 lb)   Height: 185.4 cm (73\")       Physical Exam  Constitutional:       General: He is not in acute distress.     Appearance: Normal appearance. He is not diaphoretic.   HENT:      Head: Normocephalic and atraumatic.      Mouth/Throat:      Comments: Hoarseness of voice  Cardiovascular:      Rate and Rhythm: Normal rate. Rhythm irregular.      Heart sounds: No murmur heard.  Pulmonary:      Effort: Pulmonary effort is normal. No respiratory distress.      Breath sounds: Normal breath sounds. No stridor. No wheezing, rhonchi or rales.   Abdominal:      General: Bowel sounds are normal. There is no distension.      Palpations: Abdomen is soft.      Tenderness: There is no abdominal tenderness. There is no guarding or rebound.   Musculoskeletal:         General: No swelling. Normal range of motion.      Cervical back: Neck supple. No tenderness.   Skin:     General: Skin is warm and dry.   Neurological:      General: No focal deficit present.      Mental Status: He is alert and oriented to person, place, and time.   Psychiatric:         Mood and Affect: Mood normal.         Behavior: Behavior normal.         Results Review:   I reviewed the patient's new clinical results.  I personally viewed and interpreted the patient's EKG/Telemetry data      ECG 12 " Lead    Date/Time: 11/23/2022 9:04 AM  Performed by: Imer Guidry MD  Authorized by: Imer Guidry MD   Comparison: not compared with previous ECG   Rhythm: atrial fibrillation  Ectopy: unifocal PVCs  Rate: normal  QRS axis: normal  Other findings comments: Nonspecific T wave abnormality    Clinical impression: abnormal EKG            Assessment / Plan:     1.  Permanent atrial fibrillation  --ECG today continues to show rate controlled atrial fibrillation  --Remains asymptomatic  --Continue atenolol for ventricular rate control  --Given elevated [id number] score, continue Eliquis for CVA prophylaxis  --Follow-up in 6 months, sooner if required     2.  Essential hypertension  --Hypertensive today, however BP well controlled on recent checks  --Given mild orthostatic symptoms and positive orthostatic vitals today, will continue current medications without uptitration     3.  Hyperlipidemia LDL goal <100  --Lipid profile in 11/21 with LDL 97, HDL 29, triglycerides 180  --History of statin intolerance     4.  Thoracic aneurysm, status post rupture  --Underwent emergent vascular repair in 5/22  --Generally doing well postoperatively     5.  Type 2 diabetes mellitus   --Hemoglobin A1c 11/21 6.4%  --Managed by PCP     6.    Aortic regurgitation  -- Moderate on last echocardiogram in 5/22  -- Repeat echocardiogram in 1 year for surveillance    7.  Obesity, BMI 33 kg/m²  --Advised weight loss through diet and exercise    Follow Up:   Return in about 6 months (around 5/23/2023).      Thank you for allowing me to participate in the care of your patient. Please to not hesitate to contact me with additional questions or concerns.     JAMIE Guidry MD  Interventional Cardiology   11/23/2022  08:43 EST

## 2022-12-27 RX ORDER — INDOMETHACIN 50 MG/1
CAPSULE ORAL
Qty: 180 CAPSULE | Refills: 1 | Status: SHIPPED | OUTPATIENT
Start: 2022-12-27

## 2023-01-02 DIAGNOSIS — I15.2 HYPERTENSION ASSOCIATED WITH DIABETES: ICD-10-CM

## 2023-01-02 DIAGNOSIS — E11.59 HYPERTENSION ASSOCIATED WITH DIABETES: ICD-10-CM

## 2023-01-03 RX ORDER — HYDROCHLOROTHIAZIDE 25 MG/1
TABLET ORAL
Qty: 90 TABLET | Refills: 3 | Status: SHIPPED | OUTPATIENT
Start: 2023-01-03

## 2023-01-03 NOTE — TELEPHONE ENCOUNTER
Rx Refill Note  Requested Prescriptions     Pending Prescriptions Disp Refills   • hydroCHLOROthiazide (HYDRODIURIL) 25 MG tablet [Pharmacy Med Name: HCTZ 25MG TABLETS* 25 Tablet] 90 tablet 3     Sig: TAKE ONE TABLET BY MOUTH EVERY DAY      Last office visit with prescribing clinician: 5/25/2022   Last telemedicine visit with prescribing clinician: 2/10/2023   Next office visit with prescribing clinician: 2/10/2023                         Would you like a call back once the refill request has been completed: [] Yes [] No    If the office needs to give you a call back, can they leave a voicemail: [] Yes [] No    Gregoria Lutz LPN  01/03/23, 11:51 EST

## 2023-02-09 NOTE — PROGRESS NOTES
The ABCs of the Annual Wellness Visit  Subsequent Medicare Wellness Visit    Subjective    Bryan Roman is a 77 y.o. male who presents for a Subsequent Medicare Wellness Visit.    The following portions of the patient's history were reviewed and   updated as appropriate: allergies, current medications, past family history, past medical history, past social history, past surgical history and problem list.    Compared to one year ago, the patient feels his physical   health is the same.    Compared to one year ago, the patient feels his mental   health is the same.    Recent Hospitalizations:  He was admitted within the past 365 days at [facility].       Current Medical Providers:  Patient Care Team:  Génesis Vaca MD as PCP - General (Family Medicine)  Imer Guidry MD as Consulting Physician (Cardiology)  Marcial Mosley MD (Vascular Surgery)  Christiana Hernandez OD as Consulting Physician (Optometry)  Julio Cesar Haywood MD as Consulting Physician (Urology)  Bobby Nazario MD as Consulting Physician (Orthopedic Surgery)    Outpatient Medications Prior to Visit   Medication Sig Dispense Refill   • amLODIPine (NORVASC) 10 MG tablet Take 10 mg by mouth Daily.     • Eliquis 5 MG tablet tablet TAKE 1 TABLET BY MOUTH EVERY 12 (TWELVE) HOURS. 60 tablet 11   • hydroCHLOROthiazide (HYDRODIURIL) 25 MG tablet TAKE ONE TABLET BY MOUTH EVERY DAY 90 tablet 3   • indomethacin (INDOCIN) 50 MG capsule TAKE ONE CAPSULE BY MOUTH TWO TIMES A  capsule 1   • Red Yeast Rice 600 MG capsule Take  by mouth.     • atenolol (TENORMIN) 100 MG tablet TAKE 2 TABLETS BY MOUTH DAILY. 180 tablet 3   • glipizide (GLUCOTROL XL) 5 MG ER tablet TAKE 1 TABLET BY MOUTH DAILY. 90 tablet 3   • Lancets misc      • losartan (COZAAR) 100 MG tablet TAKE 1 TABLET BY MOUTH DAILY. INDICATIONS: HIGH BLOOD PRESSURE DISORDER, DIABETES 90 tablet 3   • mupirocin (BACTROBAN) 2 % ointment      • azithromycin (ZITHROMAX) 250 MG  "tablet      • Cholecalciferol (Vitamin D3) 1.25 MG (18353 UT) capsule Take 1 capsule by mouth Every 7 (Seven) Days. 12 capsule 1   • oxyCODONE-acetaminophen (Percocet) 5-325 MG per tablet Take 1 tablet by mouth Every 6 (Six) Hours As Needed for Severe Pain . 30 tablet 0     No facility-administered medications prior to visit.       No opioid medication identified on active medication list. I have reviewed chart for other potential  high risk medication/s and harmful drug interactions in the elderly.          Aspirin is not on active medication list.  Aspirin use is contraindicated for this patient due to: current use of Eliquis.  .    Patient Active Problem List   Diagnosis   • Essential hypertension   • Hyperlipidemia LDL goal <100   • Type 2 diabetes mellitus with circulatory disorder, with long-term current use of insulin (HCC)   • Ruptured aneurysm of thoracic aorta (HCC)   • GERD (gastroesophageal reflux disease)   • Raised prostate specific antigen   • Ascending aortic aneurysm   • History of TIA (transient ischemic attack)   • Statin intolerance   • Aneurysm (HCC)   • Aortic aneurysm (HCC)   • Gout   • Nocturia   • Sleep apnea   • Wears eyeglasses   • Permanent atrial fibrillation (HCC)     Advance Care Planning  Advance Directive is not on file.  ACP discussion was declined by the patient. Patient does not have an advance directive, declines further assistance.     Objective    Vitals:    02/10/23 0835   BP: 132/78   BP Location: Left arm   Patient Position: Sitting   Cuff Size: Adult   Pulse: 69   Resp: 16   Temp: 97.4 °F (36.3 °C)   TempSrc: Temporal   SpO2: 98%   Weight: 120 kg (263 lb 9.6 oz)   Height: 185.4 cm (73\")   PainSc: 0-No pain     Estimated body mass index is 34.78 kg/m² as calculated from the following:    Height as of this encounter: 185.4 cm (73\").    Weight as of this encounter: 120 kg (263 lb 9.6 oz).    BMI is >= 30 and <35. (Class 1 Obesity). The following options were offered after " discussion;: weight loss educational material (shared in after visit summary)      Does the patient have evidence of cognitive impairment? No    Lab Results   Component Value Date    HGBA1C 6.5 02/10/2023        HEALTH RISK ASSESSMENT    Smoking Status:  Social History     Tobacco Use   Smoking Status Former   • Packs/day: 0.50   • Years: 1.00   • Pack years: 0.50   • Types: Cigarettes   • Quit date: 1967   • Years since quittin.0   Smokeless Tobacco Never     Alcohol Consumption:  Social History     Substance and Sexual Activity   Alcohol Use No     Fall Risk Screen:    STEADI Fall Risk Assessment was completed, and patient is at LOW risk for falls.Assessment completed on:2/10/2023    Depression Screening:  PHQ-2/PHQ-9 Depression Screening 2/10/2023   Little Interest or Pleasure in Doing Things 0-->not at all   Feeling Down, Depressed or Hopeless 0-->not at all   PHQ-9: Brief Depression Severity Measure Score 0       Health Habits and Functional and Cognitive Screening:  Functional & Cognitive Status 2/10/2023   Do you have difficulty preparing food and eating? No   Do you have difficulty bathing yourself, getting dressed or grooming yourself? No   Do you have difficulty using the toilet? No   Do you have difficulty moving around from place to place? No   Do you have trouble with steps or getting out of a bed or a chair? No   Current Diet Well Balanced Diet   Dental Exam Up to date   Eye Exam Up to date   Exercise (times per week) 5 times per week   Current Exercises Include Other   Current Exercise Activities Include -   Do you need help using the phone?  No   Are you deaf or do you have serious difficulty hearing?  No   Do you need help with transportation? No   Do you need help shopping? No   Do you need help preparing meals?  No   Do you need help with housework?  No   Do you need help with laundry? No   Do you need help taking your medications? No   Do you need help managing money? No   Do you ever  drive or ride in a car without wearing a seat belt? No   Have you felt unusual stress, anger or loneliness in the last month? No   Who do you live with? Spouse   If you need help, do you have trouble finding someone available to you? No   Have you been bothered in the last four weeks by sexual problems? No   Do you have difficulty concentrating, remembering or making decisions? No       Age-appropriate Screening Schedule:  Refer to the list below for future screening recommendations based on patient's age, sex and/or medical conditions. Orders for these recommended tests are listed in the plan section. The patient has been provided with a written plan.    Health Maintenance   Topic Date Due   • TDAP/TD VACCINES (1 - Tdap) 02/10/2023 (Originally 4/11/1964)   • ZOSTER VACCINE (1 of 2) 02/10/2023 (Originally 4/11/1996)   • DIABETIC EYE EXAM  03/31/2023 (Originally 8/1/2021)   • LIPID PANEL  05/11/2023   • HEMOGLOBIN A1C  08/10/2023   • URINE MICROALBUMIN  02/10/2024   • INFLUENZA VACCINE  Completed                CMS Preventative Services Quick Reference  Risk Factors Identified During Encounter  Immunizations Discussed/Encouraged: Prevnar 20 (Pneumococcal 20-valent conjugate)  The above risks/problems have been discussed with the patient.  Pertinent information has been shared with the patient in the After Visit Summary.  An After Visit Summary and PPPS were made available to the patient.    Follow Up:   Next Medicare Wellness visit to be scheduled in 1 year.       Additional E&M Note during same encounter follows:  Patient has multiple medical problems which are significant and separately identifiable that require additional work above and beyond the Medicare Wellness Visit.      Chief Complaint  Medicare Wellness-subsequent (AWV and preventive care ) and Diabetes    Subjective        Dizziness. Last month and a half. Feels like he's going to pass out. No vision loss. Lightheaded. Happens when he goes to stand  "(taking care of wife). Drinks 2 glasses of water a day, lot of diet mt dew and decaff coffee.    Not interested in Prevnar 20, says he doesn't want to take any further vaccines. Regrets last covid shot.    Bryan Roman is also being seen today for management of chronic conditions.         Objective   Vital Signs:  /78 (BP Location: Left arm, Patient Position: Sitting, Cuff Size: Adult)   Pulse 69   Temp 97.4 °F (36.3 °C) (Temporal)   Resp 16   Ht 185.4 cm (73\")   Wt 120 kg (263 lb 9.6 oz)   SpO2 98%   BMI 34.78 kg/m²     Physical Exam  Vitals and nursing note reviewed.   Constitutional:       General: He is not in acute distress.     Appearance: Normal appearance. He is well-developed and well-groomed. He is obese. He is not ill-appearing, toxic-appearing or diaphoretic.      Interventions: Face mask in place.   HENT:      Head: Normocephalic and atraumatic.      Right Ear: Hearing normal.      Left Ear: Hearing normal.   Eyes:      General: Lids are normal. No scleral icterus.        Right eye: No discharge.         Left eye: No discharge.   Cardiovascular:      Rate and Rhythm: Normal rate and regular rhythm.   Pulmonary:      Effort: Pulmonary effort is normal.      Breath sounds: Normal breath sounds.   Musculoskeletal:      Cervical back: Neck supple.   Skin:     Coloration: Skin is not jaundiced or pale.   Neurological:      General: No focal deficit present.      Mental Status: He is alert and oriented to person, place, and time.   Psychiatric:         Attention and Perception: Attention and perception normal.         Mood and Affect: Mood and affect normal.         Speech: Speech normal.         Behavior: Behavior normal. Behavior is cooperative.         Thought Content: Thought content normal.         Cognition and Memory: Cognition and memory normal.         Judgment: Judgment normal.          The following data was reviewed by: Génesis Vaca MD on 02/10/2023:  Lab Results "   Component Value Date    HGBA1C 6.5 02/10/2023    HGBA1C 6.6 (H) 05/10/2022    HGBA1C 6.40 (H) 11/16/2021      Lab Results   Component Value Date    CHLPL 75 05/11/2022    TRIG 107 05/11/2022    HDL 21 (L) 05/11/2022    LDL 32.6 05/11/2022      Office Visit on 02/10/2023   Component Date Value Ref Range Status   • Hemoglobin A1C 02/10/2023 6.5  % Final   • Lot Number 02/10/2023 [id number]   Final   • Expiration Date 02/10/2023 11/07/2024   Final   • Microalbumin, Urine 02/10/2023 150   Final    30-300mg/g   • Creatinine, Urine 02/10/2023 200   Final                 Assessment and Plan   Diagnoses and all orders for this visit:    1. Medicare annual wellness visit, subsequent (Primary)    2. Type 2 diabetes mellitus with other circulatory complication, without long-term current use of insulin (HCC)  -     POC Glycosylated Hemoglobin (Hb A1C)  -     POC Microalbumin  -     losartan (COZAAR) 100 MG tablet; Take 1 tablet by mouth Daily. Indications: High Blood Pressure Disorder, diabetes  Dispense: 90 tablet; Refill: 3  -     glipizide (GLUCOTROL XL) 5 MG ER tablet; Take 1 tablet by mouth Daily.  Dispense: 90 tablet; Refill: 3  -     Hemoglobin A1c; Future  -     Comprehensive Metabolic Panel; Future    3. Microalbuminuria due to type 2 diabetes mellitus (HCC)  Comments:  continue losartan for kidney protection    4. Permanent atrial fibrillation (HCC)  Comments:  continue chronic anticoagulation  Orders:  -     atenolol (TENORMIN) 100 MG tablet; Take 2 tablets by mouth Daily.  Dispense: 180 tablet; Refill: 3  -     TSH+Free T4; Future  -     CBC (No Diff); Future    5. Chronic diastolic (congestive) heart failure (HCC)  Comments:  follow up with cardiology  Orders:  -     TSH+Free T4; Future  -     CBC (No Diff); Future    6. Ruptured aneurysm of thoracic aorta, unspecified part  -     Lipid Panel; Future    7. Essential hypertension  -     losartan (COZAAR) 100 MG tablet; Take 1 tablet by mouth Daily. Indications:  High Blood Pressure Disorder, diabetes  Dispense: 90 tablet; Refill: 3  -     atenolol (TENORMIN) 100 MG tablet; Take 2 tablets by mouth Daily.  Dispense: 180 tablet; Refill: 3  -     TSH+Free T4; Future  -     CBC (No Diff); Future  -     Comprehensive Metabolic Panel; Future    8. Hyperlipidemia LDL goal <100  -     Lipid Panel; Future  -     Comprehensive Metabolic Panel; Future    9. Statin intolerance  -     Lipid Panel; Future  -     Comprehensive Metabolic Panel; Future    10. Idiopathic chronic gout without tophus, unspecified site  -     Comprehensive Metabolic Panel; Future  -     Uric Acid; Future    11. Vitamin B12 deficiency  -     Vitamin B12 Deficiency Cascade; Future  -     CBC (No Diff); Future  -     Folate; Future    12. Vitamin D deficiency  -     Vitamin D,25-Hydroxy; Future  -     Comprehensive Metabolic Panel; Future    13. Class 1 obesity due to excess calories with serious comorbidity and body mass index (BMI) of 34.0 to 34.9 in adult  -     TSH+Free T4; Future  -     Vitamin D,25-Hydroxy; Future  -     Comprehensive Metabolic Panel; Future    14. Abnormal blood chemistry  -     Hemoglobin A1c; Future  -     Lipid Panel; Future  -     TSH+Free T4; Future  -     Vitamin D,25-Hydroxy; Future  -     Vitamin B12 Deficiency Cascade; Future  -     CBC (No Diff); Future  -     Comprehensive Metabolic Panel; Future  -     Folate; Future  -     Uric Acid; Future    15. Medication monitoring encounter  -     Hemoglobin A1c; Future  -     Lipid Panel; Future  -     TSH+Free T4; Future  -     Vitamin D,25-Hydroxy; Future  -     Vitamin B12 Deficiency Cascade; Future  -     CBC (No Diff); Future  -     Comprehensive Metabolic Panel; Future  -     Folate; Future  -     Uric Acid; Future    16. Pneumococcal vaccination declined             Follow Up   Return in about 4 months (around 6/1/2023) for Diabetes follow up, labs at least 1-2 days prior to visit.  Patient was given instructions and counseling  regarding his condition or for health maintenance advice. Please see specific information pulled into the AVS if appropriate.

## 2023-02-10 ENCOUNTER — OFFICE VISIT (OUTPATIENT)
Dept: INTERNAL MEDICINE | Facility: CLINIC | Age: 78
End: 2023-02-10
Payer: MEDICARE

## 2023-02-10 VITALS
OXYGEN SATURATION: 98 % | HEIGHT: 73 IN | WEIGHT: 263.6 LBS | DIASTOLIC BLOOD PRESSURE: 78 MMHG | HEART RATE: 69 BPM | TEMPERATURE: 97.4 F | SYSTOLIC BLOOD PRESSURE: 132 MMHG | BODY MASS INDEX: 34.94 KG/M2 | RESPIRATION RATE: 16 BRPM

## 2023-02-10 DIAGNOSIS — Z51.81 MEDICATION MONITORING ENCOUNTER: ICD-10-CM

## 2023-02-10 DIAGNOSIS — E53.8 VITAMIN B12 DEFICIENCY: ICD-10-CM

## 2023-02-10 DIAGNOSIS — I10 ESSENTIAL HYPERTENSION: ICD-10-CM

## 2023-02-10 DIAGNOSIS — Z28.21 PNEUMOCOCCAL VACCINATION DECLINED: ICD-10-CM

## 2023-02-10 DIAGNOSIS — R79.9 ABNORMAL BLOOD CHEMISTRY: ICD-10-CM

## 2023-02-10 DIAGNOSIS — Z00.00 MEDICARE ANNUAL WELLNESS VISIT, SUBSEQUENT: Primary | ICD-10-CM

## 2023-02-10 DIAGNOSIS — E11.29 MICROALBUMINURIA DUE TO TYPE 2 DIABETES MELLITUS: ICD-10-CM

## 2023-02-10 DIAGNOSIS — E55.9 VITAMIN D DEFICIENCY: ICD-10-CM

## 2023-02-10 DIAGNOSIS — E11.59 TYPE 2 DIABETES MELLITUS WITH OTHER CIRCULATORY COMPLICATION, WITHOUT LONG-TERM CURRENT USE OF INSULIN: ICD-10-CM

## 2023-02-10 DIAGNOSIS — M1A.00X0 IDIOPATHIC CHRONIC GOUT WITHOUT TOPHUS, UNSPECIFIED SITE: ICD-10-CM

## 2023-02-10 DIAGNOSIS — I48.21 PERMANENT ATRIAL FIBRILLATION: ICD-10-CM

## 2023-02-10 DIAGNOSIS — I71.10 RUPTURED ANEURYSM OF THORACIC AORTA, UNSPECIFIED PART: ICD-10-CM

## 2023-02-10 DIAGNOSIS — E78.5 HYPERLIPIDEMIA LDL GOAL <100: ICD-10-CM

## 2023-02-10 DIAGNOSIS — E66.09 CLASS 1 OBESITY DUE TO EXCESS CALORIES WITH SERIOUS COMORBIDITY AND BODY MASS INDEX (BMI) OF 34.0 TO 34.9 IN ADULT: ICD-10-CM

## 2023-02-10 DIAGNOSIS — I50.32 CHRONIC DIASTOLIC (CONGESTIVE) HEART FAILURE: ICD-10-CM

## 2023-02-10 DIAGNOSIS — Z78.9 STATIN INTOLERANCE: ICD-10-CM

## 2023-02-10 DIAGNOSIS — R80.9 MICROALBUMINURIA DUE TO TYPE 2 DIABETES MELLITUS: ICD-10-CM

## 2023-02-10 PROBLEM — E66.2: Status: RESOLVED | Noted: 2021-02-23 | Resolved: 2023-02-10

## 2023-02-10 PROBLEM — M31.6 TEMPORAL ARTERITIS: Status: RESOLVED | Noted: 2021-04-08 | Resolved: 2023-02-10

## 2023-02-10 LAB
EXPIRATION DATE: NORMAL
HBA1C MFR BLD: 6.5 %
Lab: NORMAL
POC CREATININE URINE: 200
POC MICROALBUMIN URINE: 150

## 2023-02-10 PROCEDURE — 1159F MED LIST DOCD IN RCRD: CPT | Performed by: FAMILY MEDICINE

## 2023-02-10 PROCEDURE — 83036 HEMOGLOBIN GLYCOSYLATED A1C: CPT | Performed by: FAMILY MEDICINE

## 2023-02-10 PROCEDURE — 99397 PER PM REEVAL EST PAT 65+ YR: CPT | Performed by: FAMILY MEDICINE

## 2023-02-10 PROCEDURE — G0439 PPPS, SUBSEQ VISIT: HCPCS | Performed by: FAMILY MEDICINE

## 2023-02-10 PROCEDURE — 99213 OFFICE O/P EST LOW 20 MIN: CPT | Performed by: FAMILY MEDICINE

## 2023-02-10 PROCEDURE — 1170F FXNL STATUS ASSESSED: CPT | Performed by: FAMILY MEDICINE

## 2023-02-10 PROCEDURE — 82044 UR ALBUMIN SEMIQUANTITATIVE: CPT | Performed by: FAMILY MEDICINE

## 2023-02-10 PROCEDURE — 96160 PT-FOCUSED HLTH RISK ASSMT: CPT | Performed by: FAMILY MEDICINE

## 2023-02-10 PROCEDURE — 3044F HG A1C LEVEL LT 7.0%: CPT | Performed by: FAMILY MEDICINE

## 2023-02-10 RX ORDER — ATENOLOL 100 MG/1
200 TABLET ORAL DAILY
Qty: 180 TABLET | Refills: 3 | Status: SHIPPED | OUTPATIENT
Start: 2023-02-10

## 2023-02-10 RX ORDER — LOSARTAN POTASSIUM 100 MG/1
100 TABLET ORAL DAILY
Qty: 90 TABLET | Refills: 3 | Status: SHIPPED | OUTPATIENT
Start: 2023-02-10

## 2023-02-10 RX ORDER — GLIPIZIDE 5 MG/1
5 TABLET, FILM COATED, EXTENDED RELEASE ORAL DAILY
Qty: 90 TABLET | Refills: 3 | Status: SHIPPED | OUTPATIENT
Start: 2023-02-10

## 2023-02-10 NOTE — PATIENT INSTRUCTIONS
Heart Failure  Heart failure is a condition in which the heart has trouble pumping blood. This means your heart does not pump blood efficiently for your body to work well. In some cases of heart failure, fluid may back up into your lungs or you may have swelling (edema) in your lower legs. Heart failure is usually a long-term (chronic) condition. It is important for you to take good care of yourself and follow your health care provider's treatment plan.    How to cope with Congestive Heart Failure:  Congestive Heart Failure (CHF) is not an unchanging condition.  Heart Failure may deteriorate for a variety of reasons.  For instance:  excessive salt or fluid intake, illness such as flu or pneumonia, cardiac arrhythmias, and heart attack all may worsen heart failure.  Sometimes the patient with heart failure worsens for no apparent reason.  The educated patient must know how to anticipate deterioration, and to know how to react to it in order to correct the deterioration before it becomes serious.  Just as when steering a car, the heart failure patient must adjust to changes in their condition in order to stay on course.  A little too wet and they become congested and short of breathe.  A little too dry and they become weak, fatigued and dizzy.    When your provider examines your neck, he/she is looking at your veins to assess how much fluid is in the circulatory system.  You can also help monitor your fluid status by paying attention to your condition,(particularly how you feel), by noting how much swelling is present at the ankles and monitoring your body weight daily. A little bit of swelling of the ankles at the end of the day is normal and indicates sufficient fluid in the circulatory system to allow a weakened heart to pump normally.  More a than a trace of swelling at the ankles indicates fluid excess.  This fluid may re-enter the central circulation when you lie down, awakening you with shortness of breath  or forcing you to sleep on several pillows for comfort.  Similarly if you weight goes up by more than 2-3 pounds in 1-2 days or by 5 pounds over a week, the body may be retaining too much fluid and worsening heart failure may ensue.  CAUSES   Some health conditions can cause heart failure. Those health conditions include:  High blood pressure (hypertension). Hypertension causes the heart muscle to work harder than normal. When pressure in the blood vessels is high, the heart needs to pump (contract) with more force in order to circulate blood throughout the body. High blood pressure eventually causes the heart to become stiff and weak.  Coronary artery disease (CAD). CAD is the buildup of cholesterol and fat (plaque) in the arteries of the heart. The blockage in the arteries deprives the heart muscle of oxygen and blood. This can cause chest pain and may lead to a heart attack. High blood pressure can also contribute to CAD.  Heart attack (myocardial infarction). A heart attack occurs when one or more arteries in the heart become blocked. The loss of oxygen damages the muscle tissue of the heart. When this happens, part of the heart muscle dies. The injured tissue does not contract as well and weakens the heart's ability to pump blood.  Abnormal heart valves. When the heart valves do not open and close properly, it can cause heart failure. This makes the heart muscle pump harder to keep the blood flowing.  Heart muscle disease (cardiomyopathy or myocarditis). Heart muscle disease is damage to the heart muscle from a variety of causes. These can include drug or alcohol abuse, infections, or unknown reasons. These can increase the risk of heart failure.  Lung disease. Lung disease makes the heart work harder because the lungs do not work properly. This can cause a strain on the heart, leading it to fail.  Diabetes. Diabetes increases the risk of heart failure. High blood sugar contributes to high fat (lipid) levels in  the blood. Diabetes can also cause slow damage to tiny blood vessels that carry important nutrients to the heart muscle. When the heart does not get enough oxygen and food, it can cause the heart to become weak and stiff. This leads to a heart that does not contract efficiently.  Other conditions can contribute to heart failure. These include abnormal heart rhythms, thyroid problems, and low blood counts (anemia).  Certain unhealthy behaviors can increase the risk of heart failure, including:  Being overweight.  Smoking or chewing tobacco.  Eating foods high in fat and cholesterol.  Abusing illicit drugs or alcohol.  Lacking physical activity.  SYMPTOMS   Heart failure symptoms may vary and can be hard to detect. Symptoms may include:  Shortness of breath with activity, such as climbing stairs.  Persistent cough.  Swelling of the feet, ankles, legs, or abdomen.  Unexplained weight gain.  Difficulty breathing when lying flat (orthopnea).  Waking from sleep because of the need to sit up and get more air.  Rapid heartbeat.  Fatigue and loss of energy.  Feeling light-headed, dizzy, or close to fainting.  Loss of appetite.  Nausea.  Increased urination during the night (nocturia).      DIAGNOSIS   A diagnosis of heart failure is based on your history, symptoms, physical examination, and diagnostic tests. Diagnostic tests for heart failure may include:  Echocardiography.  Electrocardiography.  Chest X-ray.  Blood tests.  Exercise stress test.  Cardiac angiography.  Radionuclide scans.  TREATMENT   Treatment is aimed at managing the symptoms of heart failure. Medicines, behavioral changes, or surgical intervention may be necessary to treat heart failure.  Medicines to help treat heart failure may include:  Angiotensin-converting enzyme (ACE) inhibitors. This type of medicine blocks the effects of a blood protein called angiotensin-converting enzyme. ACE inhibitors relax (dilate) the blood vessels and help lower blood  pressure.  Angiotensin receptor blockers (ARBs). This type of medicine blocks the actions of a blood protein called angiotensin. Angiotensin receptor blockers dilate the blood vessels and help lower blood pressure.  Water pills (diuretics). Diuretics cause the kidneys to remove salt and water from the blood. The extra fluid is removed through urination. This loss of extra fluid lowers the volume of blood the heart pumps.  Beta blockers. These prevent the heart from beating too fast and improve heart muscle strength.  Digitalis. This increases the force of the heartbeat.  Healthy behavior changes include:  Obtaining and maintaining a healthy weight.  Stopping smoking or chewing tobacco.  Eating heart-healthy foods.  Limiting or avoiding alcohol.  Stopping illicit drug use.  Physical activity as directed by your health care provider.  Surgical treatment for heart failure may include:  A procedure to open blocked arteries, repair damaged heart valves, or remove damaged heart muscle tissue.  A pacemaker to improve heart muscle function and control certain abnormal heart rhythms.  An internal cardioverter defibrillator to treat certain serious abnormal heart rhythms.  A left ventricular assist device (LVAD) to assist the pumping ability of the heart.        HOME CARE INSTRUCTIONS   Take medicines only as directed by your health care provider. Medicines are important in reducing the workload of your heart, slowing the progression of heart failure, and improving your symptoms.  Do not stop taking your medicine unless directed by your health care provider.  Do not skip any dose of medicine.  Refill your prescriptions before you run out of medicine. Your medicines are needed every day.  Engage in moderate physical activity if directed by your health care provider. Moderate physical activity can benefit some people. The elderly and people with severe heart failure should consult with a health care provider for physical  activity recommendations.  Eat heart-healthy foods. Food choices should be free of trans fat and low in saturated fat, cholesterol, and salt (sodium). Healthy choices include fresh or frozen fruits and vegetables, fish, lean meats, legumes, fat-free or low-fat dairy products, and whole grain or high fiber foods. Talk to a dietitian to learn more about heart-healthy foods.  Limit sodium if directed by your health care provider. Sodium restriction may reduce symptoms of heart failure in some people. Talk to a dietitian to learn more about heart-healthy seasonings.  Use healthy cooking methods. Healthy cooking methods include roasting, grilling, broiling, baking, poaching, steaming, or stir-frying. Talk to a dietitian to learn more about healthy cooking methods.  Limit fluids if directed by your health care provider. Fluid restriction may reduce symptoms of heart failure in some people.  Weigh yourself every day. Daily weights are important in the early recognition of excess fluid. You should weigh yourself every morning after you urinate and before you eat breakfast. Wear the same amount of clothing each time you weigh yourself. Record your daily weight. Provide your health care provider with your weight record.  Monitor and record your blood pressure if directed by your health care provider.  Check your pulse if directed by your health care provider.  Lose weight if directed by your health care provider. Weight loss may reduce symptoms of heart failure in some people.  Stop smoking or chewing tobacco. Nicotine makes your heart work harder by causing your blood vessels to constrict. Do not use nicotine gum or patches before talking to your health care provider.  Keep all follow-up visits as directed by your health care provider. This is important.  Limit alcohol intake to no more than 1 drink per day for nonpregnant women and 2 drinks per day for men. One drink equals 12 ounces of beer, 5 ounces of wine, or 1½ ounces  of hard liquor. Drinking more than that is harmful to your heart. Tell your health care provider if you drink alcohol several times a week. Talk with your health care provider about whether alcohol is safe for you. If your heart has already been damaged by alcohol or you have severe heart failure, drinking alcohol should be stopped completely.  Stop illicit drug use.  Stay up-to-date with immunizations. It is especially important to prevent respiratory infections through current pneumococcal and influenza immunizations.  Manage other health conditions such as hypertension, diabetes, thyroid disease, or abnormal heart rhythms as directed by your health care provider.  Learn to manage stress.  Plan rest periods when fatigued.  Learn strategies to manage high temperatures. If the weather is extremely hot:  Avoid vigorous physical activity.  Use air conditioning or fans or seek a cooler location.  Avoid caffeine and alcohol.  Wear loose-fitting, lightweight, and light-colored clothing.  Learn strategies to manage cold temperatures. If the weather is extremely cold:  Avoid vigorous physical activity.  Layer clothes.  Wear mittens or gloves, a hat, and a scarf when going outside.  Avoid alcohol.  Obtain ongoing education and support as needed.  Participate in or seek rehabilitation as needed to maintain or improve independence and quality of life.      Medication Type Medication Name/Dose How much to take When to take it                                                                                                           To Monitor Your Own Fluid Status at Home:   1.Weigh yourself daily   2. Weigh yourself at the same time every day-before breakfast is best   3. Use the same scale all the time   4. Wear the same amount of clothes when you weigh yourself   5. Empty your bladder before weighing   6. Record your weight on the daily record below   7. The weight at which there is just a little bit of swelling in the ankles  at the end of       the day is your ideal weight- try and maintain it   8. When taking diuretics, avoid drinking too much in the way of fluids, even if your      mouth is dry and you feel thirsty.  This could counter the effect of the diuretic           and dilute the body's salts causing weakness and confusion   9. You should drink no more than 2000 ml (8 glasses or cups) of fluid per day, or          whatever amount is prescribed for you      Date Weight Exercise Duration Symptoms Better/Worse/Same Swelling Better/Worse/Same                                                                                                                                                                Congestive Heart Failure Management Guide      Green Zone: All Clear Green Zone: Actions   Your Goal Weight____________  *No Shortness of Breath  *No Swelling  *No Weight Gain  *No Chest Pain  *No Decrease in your ability to    maintain your activity level *Your symptoms are under control  *Continue taking your medications as              ordered  *Continue daily weights  *Follow low salt diet  *Keep all physician/provider     appointments     Yellow Zone: Caution Yellow Zone: Actions   If you have any of the following signs or symptoms;    *weight gain of 3 or more pounds in 2 days  *increased cough  *increased swelling  *increased shortness of breath with activity  *increased in the number of pillows to          sleep comfortably    Call your provider's medical assistant or home health nurse or nurse coordinator *Your symptoms may indicate that you           need an adjustment of your medications  *Call your provider's medical         assistant, home health nurse, or      nurse coordinator  *NAME______________________    *NUMBER___________________    *INSTRUCTIONS_______________________________________________________________________________________________________       Red Zone: Medical Alert Red Zone: Actions   *Unrelieved shortness  "of breath   *Shortness of breath at rest  *Unrelieved chest pain  *Wheezing or chest tightness at rest  *Need to sit in chair to sleep  *Weight gain or loss of more than 5       pounds in 2 days  *Confusion This indicates that you need to be evaluated by a physician right away  Call your Doctor immediately if you are going into the red zone    Doctor:______________________________  Number:_____________________________  If unable to reach Doctor, then call 911                        Low-Sodium Eating Plan  Sodium, which is an element that makes up salt, helps you maintain a healthy balance of fluids in your body. Too much sodium can increase your blood pressure and cause fluid and waste to be held in your body.  Your health care provider or dietitian may recommend following this plan if you have high blood pressure (hypertension), kidney disease, liver disease, or heart failure. Eating less sodium can help lower your blood pressure, reduce swelling, and protect your heart, liver, and kidneys.  What are tips for following this plan?  Reading food labels  The Nutrition Facts label lists the amount of sodium in one serving of the food. If you eat more than one serving, you must multiply the listed amount of sodium by the number of servings.  Choose foods with less than 140 mg of sodium per serving.  Avoid foods with 300 mg of sodium or more per serving.  Shopping    Look for lower-sodium products, often labeled as \"low-sodium\" or \"no salt added.\"  Always check the sodium content, even if foods are labeled as \"unsalted\" or \"no salt added.\"  Buy fresh foods.  Avoid canned foods and pre-made or frozen meals.  Avoid canned, cured, or processed meats.  Buy breads that have less than 80 mg of sodium per slice.  Cooking    Eat more home-cooked food and less restaurant, buffet, and fast food.  Avoid adding salt when cooking. Use salt-free seasonings or herbs instead of table salt or sea salt. Check with your health care " "provider or pharmacist before using salt substitutes.  Cook with plant-based oils, such as canola, sunflower, or olive oil.  Meal planning  When eating at a restaurant, ask that your food be prepared with less salt or no salt, if possible. Avoid dishes labeled as brined, pickled, cured, smoked, or made with soy sauce, miso, or teriyaki sauce.  Avoid foods that contain MSG (monosodium glutamate). MSG is sometimes added to Chinese food, bouillon, and some canned foods.  Make meals that can be grilled, baked, poached, roasted, or steamed. These are generally made with less sodium.  General information  Most people on this plan should limit their sodium intake to 1,500-2,000 mg (milligrams) of sodium each day.  What foods should I eat?  Fruits  Fresh, frozen, or canned fruit. Fruit juice.  Vegetables  Fresh or frozen vegetables. \"No salt added\" canned vegetables. \"No salt added\" tomato sauce and paste. Low-sodium or reduced-sodium tomato and vegetable juice.  Grains  Low-sodium cereals, including oats, puffed wheat and rice, and shredded wheat. Low-sodium crackers. Unsalted rice. Unsalted pasta. Low-sodium bread. Whole-grain breads and whole-grain pasta.  Meats and other proteins  Fresh or frozen (no salt added) meat, poultry, seafood, and fish. Low-sodium canned tuna and salmon. Unsalted nuts. Dried peas, beans, and lentils without added salt. Unsalted canned beans. Eggs. Unsalted nut butters.  Dairy  Milk. Soy milk. Cheese that is naturally low in sodium, such as ricotta cheese, fresh mozzarella, or Swiss cheese. Low-sodium or reduced-sodium cheese. Cream cheese. Yogurt.  Seasonings and condiments  Fresh and dried herbs and spices. Salt-free seasonings. Low-sodium mustard and ketchup. Sodium-free salad dressing. Sodium-free light mayonnaise. Fresh or refrigerated horseradish. Lemon juice. Vinegar.  Other foods  Homemade, reduced-sodium, or low-sodium soups. Unsalted popcorn and pretzels. Low-salt or salt-free " chips.  The items listed above may not be a complete list of foods and beverages you can eat. Contact a dietitian for more information.  What foods should I avoid?  Vegetables  Sauerkraut, pickled vegetables, and relishes. Olives. French fries. Onion rings. Regular canned vegetables (not low-sodium or reduced-sodium). Regular canned tomato sauce and paste (not low-sodium or reduced-sodium). Regular tomato and vegetable juice (not low-sodium or reduced-sodium). Frozen vegetables in sauces.  Grains  Instant hot cereals. Bread stuffing, pancake, and biscuit mixes. Croutons. Seasoned rice or pasta mixes. Noodle soup cups. Boxed or frozen macaroni and cheese. Regular salted crackers. Self-rising flour.  Meats and other proteins  Meat or fish that is salted, canned, smoked, spiced, or pickled. Precooked or cured meat, such as sausages or meat loaves. Elder. Ham. Pepperoni. Hot dogs. Corned beef. Chipped beef. Salt pork. Jerky. Pickled herring. Anchovies and sardines. Regular canned tuna. Salted nuts.  Dairy  Processed cheese and cheese spreads. Hard cheeses. Cheese curds. Blue cheese. Feta cheese. String cheese. Regular cottage cheese. Buttermilk. Canned milk.  Fats and oils  Salted butter. Regular margarine. Ghee. Elder fat.  Seasonings and condiments  Onion salt, garlic salt, seasoned salt, table salt, and sea salt. Canned and packaged gravies. [facility] [place] sauce. Tartar sauce. Barbecue sauce. Teriyaki sauce. Soy sauce, including reduced-sodium. Steak sauce. Fish sauce. Oyster sauce. Cocktail sauce. Horseradish that you find on the shelf. Regular ketchup and mustard. Meat flavorings and tenderizers. Bouillon cubes. Hot sauce. Pre-made or packaged marinades. Pre-made or packaged taco seasonings. Relishes. Regular salad dressings. Salsa.  Other foods  Salted popcorn and pretzels. Corn chips and puffs. Potato and tortilla chips. Canned or dried soups. Pizza. Frozen entrees and pot pies.  The items listed above may not be  a complete list of foods and beverages you should avoid. Contact a dietitian for more information.  Summary  Eating less sodium can help lower your blood pressure, reduce swelling, and protect your heart, liver, and kidneys.  Most people on this plan should limit their sodium intake to 1,500-2,000 mg (milligrams) of sodium each day.  Canned, boxed, and frozen foods are high in sodium. Restaurant foods, fast foods, and pizza are also very high in sodium. You also get sodium by adding salt to food.  Try to cook at home, eat more fresh fruits and vegetables, and eat less fast food and canned, processed, or prepared foods.  This information is not intended to replace advice given to you by your health care provider. Make sure you discuss any questions you have with your health care provider.  Document Revised: 2021 Document Reviewed: 2020  [employer] Patient Education ©  [employer]      Medicare Wellness  Personal Prevention Plan of Service     Date of Office Visit:    Encounter Provider:  Génesis Vaca MD  Place of Service:  [facility]  Patient Name: Bryan Roman  :  1945    As part of the Medicare Wellness portion of your visit today, we are providing you with this personalized preventive plan of services (PPPS). This plan is based upon recommendations of the [place] Preventive Services Task Force (USPSTF) and the Advisory Committee on Immunization Practices (ACIP).    This lists the preventive care services that should be considered, and provides dates of when you are due. Items listed as completed are up-to-date and do not require any further intervention.    Health Maintenance   Topic Date Due    TDAP/TD VACCINES (1 - Tdap) 02/10/2023 (Originally 1964)    ZOSTER VACCINE (1 of 2) 02/10/2023 (Originally 1996)    DIABETIC EYE EXAM  2023 (Originally 2021)    Pneumococcal Vaccine 65+ (2 - PCV) 02/10/2024 (Originally 2015)     LIPID PANEL  05/11/2023    HEMOGLOBIN A1C  08/10/2023    ANNUAL WELLNESS VISIT  02/10/2024    URINE MICROALBUMIN  02/10/2024    HEPATITIS C SCREENING  Completed    COVID-19 Vaccine  Completed    INFLUENZA VACCINE  Completed    COLORECTAL CANCER SCREENING  Discontinued       Orders Placed This Encounter   Procedures    Hemoglobin A1c     Standing Status:   Future     Standing Expiration Date:   2/10/2024     Order Specific Question:   Release to patient     Answer:   Routine Release    Lipid Panel     Standing Status:   Future     Standing Expiration Date:   2/10/2024     Order Specific Question:   [facility] Has the patient fasted?     Answer:   Yes    TSH+Free T4     Standing Status:   Future     Standing Expiration Date:   2/10/2024     Order Specific Question:   Release to patient     Answer:   Routine Release    Vitamin D,25-Hydroxy     Standing Status:   Future     Standing Expiration Date:   2/10/2024     Order Specific Question:   Release to patient     Answer:   Routine Release    Vitamin B12 Deficiency Cascade     Standing Status:   Future     Standing Expiration Date:   2/10/2024     Order Specific Question:   Release to patient     Answer:   Routine Release    CBC (No Diff)     Standing Status:   Future     Standing Expiration Date:   2/10/2024     Order Specific Question:   Release to patient     Answer:   Routine Release    Comprehensive Metabolic Panel     Standing Status:   Future     Standing Expiration Date:   2/10/2024     Order Specific Question:   Release to patient     Answer:   Routine Release    Folate     Standing Status:   Future     Standing Expiration Date:   2/10/2024     Order Specific Question:   Release to patient     Answer:   Routine Release    Uric Acid     Standing Status:   Future     Standing Expiration Date:   2/10/2024     Order Specific Question:   Release to patient     Answer:   Routine Release    POC Glycosylated Hemoglobin (Hb A1C)     Order Specific Question:   Release to  patient     Answer:   Routine Release    POC Microalbumin     Order Specific Question:   Release to patient     Answer:   Routine Release       Return in about 4 months (around 6/1/2023) for Diabetes follow up, labs at least 1-2 days prior to visit.

## 2023-02-27 DIAGNOSIS — E11.59 TYPE 2 DIABETES MELLITUS WITH OTHER CIRCULATORY COMPLICATION, WITHOUT LONG-TERM CURRENT USE OF INSULIN: ICD-10-CM

## 2023-02-27 RX ORDER — GLIPIZIDE 5 MG/1
TABLET, FILM COATED, EXTENDED RELEASE ORAL
Qty: 90 TABLET | Refills: 3 | OUTPATIENT
Start: 2023-02-27

## 2023-05-19 DIAGNOSIS — R39.9 LOWER URINARY TRACT SYMPTOMS (LUTS): ICD-10-CM

## 2023-05-19 DIAGNOSIS — N40.0 BENIGN PROSTATIC HYPERPLASIA, UNSPECIFIED WHETHER LOWER URINARY TRACT SYMPTOMS PRESENT: Primary | ICD-10-CM

## 2023-05-23 ENCOUNTER — LAB (OUTPATIENT)
Dept: LAB | Facility: HOSPITAL | Age: 78
End: 2023-05-23
Payer: MEDICARE

## 2023-05-23 PROCEDURE — 84153 ASSAY OF PSA TOTAL: CPT | Performed by: FAMILY MEDICINE

## 2023-05-23 PROCEDURE — 82306 VITAMIN D 25 HYDROXY: CPT | Performed by: FAMILY MEDICINE

## 2023-05-23 PROCEDURE — 83036 HEMOGLOBIN GLYCOSYLATED A1C: CPT | Performed by: FAMILY MEDICINE

## 2023-05-23 PROCEDURE — 80053 COMPREHEN METABOLIC PANEL: CPT | Performed by: FAMILY MEDICINE

## 2023-05-23 PROCEDURE — 82607 VITAMIN B-12: CPT | Performed by: FAMILY MEDICINE

## 2023-05-23 PROCEDURE — 84439 ASSAY OF FREE THYROXINE: CPT | Performed by: FAMILY MEDICINE

## 2023-05-23 PROCEDURE — 80061 LIPID PANEL: CPT | Performed by: FAMILY MEDICINE

## 2023-05-23 PROCEDURE — 84550 ASSAY OF BLOOD/URIC ACID: CPT | Performed by: FAMILY MEDICINE

## 2023-05-23 PROCEDURE — 82746 ASSAY OF FOLIC ACID SERUM: CPT | Performed by: FAMILY MEDICINE

## 2023-05-23 PROCEDURE — 85027 COMPLETE CBC AUTOMATED: CPT | Performed by: FAMILY MEDICINE

## 2023-05-23 PROCEDURE — 84443 ASSAY THYROID STIM HORMONE: CPT | Performed by: FAMILY MEDICINE

## 2023-05-24 ENCOUNTER — OFFICE VISIT (OUTPATIENT)
Dept: CARDIOLOGY | Facility: CLINIC | Age: 78
End: 2023-05-24
Payer: MEDICARE

## 2023-05-24 VITALS
SYSTOLIC BLOOD PRESSURE: 130 MMHG | HEIGHT: 73 IN | BODY MASS INDEX: 35.41 KG/M2 | DIASTOLIC BLOOD PRESSURE: 86 MMHG | OXYGEN SATURATION: 98 % | HEART RATE: 61 BPM | WEIGHT: 267.2 LBS

## 2023-05-24 DIAGNOSIS — E78.5 HYPERLIPIDEMIA LDL GOAL <100: ICD-10-CM

## 2023-05-24 DIAGNOSIS — R06.02 SHORTNESS OF BREATH: ICD-10-CM

## 2023-05-24 DIAGNOSIS — I10 ESSENTIAL HYPERTENSION: ICD-10-CM

## 2023-05-24 DIAGNOSIS — I48.21 PERMANENT ATRIAL FIBRILLATION: Primary | ICD-10-CM

## 2023-05-24 DIAGNOSIS — I71.21 ASCENDING AORTIC ANEURYSM, UNSPECIFIED WHETHER RUPTURED: ICD-10-CM

## 2023-05-24 DIAGNOSIS — E11.51 TYPE 2 DIABETES MELLITUS WITH DIABETIC PERIPHERAL ANGIOPATHY WITHOUT GANGRENE, WITH LONG-TERM CURRENT USE OF INSULIN: ICD-10-CM

## 2023-05-24 DIAGNOSIS — Z79.4 TYPE 2 DIABETES MELLITUS WITH DIABETIC PERIPHERAL ANGIOPATHY WITHOUT GANGRENE, WITH LONG-TERM CURRENT USE OF INSULIN: ICD-10-CM

## 2023-05-24 PROCEDURE — 3075F SYST BP GE 130 - 139MM HG: CPT | Performed by: INTERNAL MEDICINE

## 2023-05-24 PROCEDURE — 93000 ELECTROCARDIOGRAM COMPLETE: CPT | Performed by: INTERNAL MEDICINE

## 2023-05-24 PROCEDURE — 3079F DIAST BP 80-89 MM HG: CPT | Performed by: INTERNAL MEDICINE

## 2023-05-24 PROCEDURE — 99214 OFFICE O/P EST MOD 30 MIN: CPT | Performed by: INTERNAL MEDICINE

## 2023-05-24 NOTE — PROGRESS NOTES
[facility] Cardiology Office Follow Up Note    Bryan Roman  [id number]  2023    Primary Care Provider: Génesis Vaca MD    Chief Complaint: Routine follow-up    History of Present Illness:   Mr. Bryan Roman is a 78 y.o. male who presents to the Cardiology Clinic for routine follow-up.  The patient has a past medical history significant for type 2 diabetes mellitus, hypercholesterolemia, GERD, gout, and obesity.  He has a past vascular history significant for ascending aortic aneurysm status post repair in 9/15.  He subsequently had rupture of the remaining thoracic aortic aneurysm in , and underwent emergent operative repair at [facility].   He presents today for routine follow-up.  Since his last appointment, the patient reports an approximate 3-4-month history of progressive exertional dyspnea.  He denies any significant wheezing.  No chest pain or exertional chest discomfort.  He remains asymptomatic from his atrial fibrillation without palpitations.  He continues to tolerate Eliquis without significant bleeding or bruising.  He denies any recent history of orthopnea, PND, or significant lower extremity swelling.  No other specific complaints today.     Past Cardiac Testin. Last Coronary Angio: None  2. Prior Stress Testing: GXT 9/15              1.  No evidence of inducible ischemia  3. Last Echo:               1.  Normal left ventricular size and systolic function, LVEF 55-60              2.  Normal diastolic filling pattern              3.  Mild to moderate concentric LVH.              4.  Mild right ventricular dilation with normal RV systolic function.              5.  Moderate aortic regurgitation              6.  Mild tricuspid regurgitation.  4. Prior Holter Monitor: None    Review of Systems:   Review of Systems   Constitutional: Negative for activity change, appetite change, chills, diaphoresis, fatigue, fever, unexpected weight gain  "and unexpected weight loss.   Eyes: Negative for blurred vision and double vision.   Respiratory: Positive for shortness of breath. Negative for cough, chest tightness and wheezing.    Cardiovascular: Negative for chest pain, palpitations and leg swelling.   Gastrointestinal: Negative for abdominal pain, anal bleeding, blood in stool and GERD.   Endocrine: Negative for cold intolerance and heat intolerance.   Genitourinary: Negative for hematuria.   Neurological: Negative for dizziness, syncope, weakness and light-headedness.   Hematological: Does not bruise/bleed easily.   Psychiatric/Behavioral: Negative for depressed mood and stress. The patient is not nervous/anxious.        I have reviewed and/or updated the patient's past medical, past surgical, family, social history, problem list and allergies as appropriate.     Medications:     Current Outpatient Medications:   •  atenolol (TENORMIN) 100 MG tablet, Take 2 tablets by mouth Daily., Disp: 180 tablet, Rfl: 3  •  Eliquis 5 MG tablet tablet, TAKE 1 TABLET BY MOUTH EVERY 12 (TWELVE) HOURS., Disp: 60 tablet, Rfl: 11  •  glipizide (GLUCOTROL XL) 5 MG ER tablet, Take 1 tablet by mouth Daily., Disp: 90 tablet, Rfl: 3  •  hydroCHLOROthiazide (HYDRODIURIL) 25 MG tablet, TAKE ONE TABLET BY MOUTH EVERY DAY, Disp: 90 tablet, Rfl: 3  •  indomethacin (INDOCIN) 50 MG capsule, TAKE ONE CAPSULE BY MOUTH TWO TIMES A DAY, Disp: 180 capsule, Rfl: 1  •  losartan (COZAAR) 100 MG tablet, Take 1 tablet by mouth Daily. Indications: High Blood Pressure Disorder, diabetes, Disp: 90 tablet, Rfl: 3  •  Red Yeast Rice 600 MG capsule, Take  by mouth., Disp: , Rfl:     Physical Exam:  Vital Signs:   Vitals:    05/24/23 0940   BP: 130/86   BP Location: Right arm   Patient Position: Sitting   Cuff Size: Adult   Pulse: 61   SpO2: 98%   Weight: 121 kg (267 lb 3.2 oz)   Height: 185.4 cm (73\")   PainSc: 0-No pain       Physical Exam  Constitutional:       General: He is not in acute distress.     " Appearance: He is obese. He is not diaphoretic.   HENT:      Head: Normocephalic and atraumatic.   Cardiovascular:      Rate and Rhythm: Normal rate. Rhythm irregular.      Heart sounds: No murmur heard.  Pulmonary:      Effort: Pulmonary effort is normal. No respiratory distress.      Breath sounds: Normal breath sounds. No stridor. No wheezing, rhonchi or rales.   Abdominal:      General: Bowel sounds are normal. There is no distension.      Palpations: Abdomen is soft.      Tenderness: There is no abdominal tenderness. There is no guarding or rebound.   Musculoskeletal:         General: No swelling. Normal range of motion.      Cervical back: Neck supple. No tenderness.   Skin:     General: Skin is warm and dry.   Neurological:      General: No focal deficit present.      Mental Status: He is alert and oriented to person, place, and time.   Psychiatric:         Mood and Affect: Mood normal.         Behavior: Behavior normal.         Results Review:   I reviewed the patient's new clinical results.  I personally viewed and interpreted the patient's EKG/Telemetry data      ECG 12 Lead    Date/Time: 5/24/2023 10:16 AM  Performed by: Imer Guidry MD  Authorized by: Imer Guidry MD   Comparison: compared with previous ECG   Similar to previous ECG  Rhythm: atrial fibrillation  Rate: normal  QRS axis: normal  Other findings: non-specific ST-T wave changes    Clinical impression: abnormal EKG            Assessment / Plan:     1.  Permanent atrial fibrillation  --Asymptomatic without palpitations  --ECG today shows atrial fibrillation continues to be rate controlled  --Continue atenolol for ventricular rate control strategy  --Given elevated [id number] score, continue Eliquis for CVA prophylaxis  --Follow-up in 1 year, sooner if required     2.  Essential hypertension  --BP remains adequately controlled today  --Continue current antihypertensives     3.  Hyperlipidemia LDL goal <100  --Lipid profile in 5/23  with , HDL 29, triglycerides 148  --History of statin intolerance     4.  Thoracic aneurysm, status post rupture  --Underwent emergent vascular repair in 5/22     5.  Type 2 diabetes mellitus   --Hemoglobin A1c 5/23 6.8%  --Managed by PCP     6.    Aortic regurgitation  -- Moderate on last echocardiogram in 5/22  -- Repeat echocardiogram for surveillance     7.  Obesity, BMI 35 kg/m²  --Advised weight loss through diet and exercise    8.  Exertional dyspnea  --Progressive exertional dyspnea over the past 3-4 months, several potential underlying etiologies  --Will repeat echocardiogram to reassess AI, rule out valvulopathy as underlying etiology for dyspnea  --Low suspicion for dyspnea being an anginal equivalent  --If echocardiogram shows no significant valvulopathy's, will refer to pulmonology for PFTs    Follow Up:   Return in about 1 year (around 5/24/2024).      Thank you for allowing me to participate in the care of your patient. Please to not hesitate to contact me with additional questions or concerns.     JAMIE Guidry MD  Interventional Cardiology   05/24/2023  10:03 EDT

## 2023-05-25 ENCOUNTER — TELEPHONE (OUTPATIENT)
Dept: UROLOGY | Facility: CLINIC | Age: 78
End: 2023-05-25

## 2023-05-25 ENCOUNTER — OFFICE VISIT (OUTPATIENT)
Dept: UROLOGY | Facility: CLINIC | Age: 78
End: 2023-05-25
Payer: MEDICARE

## 2023-05-25 VITALS
DIASTOLIC BLOOD PRESSURE: 78 MMHG | WEIGHT: 269 LBS | SYSTOLIC BLOOD PRESSURE: 122 MMHG | TEMPERATURE: 98.4 F | BODY MASS INDEX: 35.65 KG/M2 | OXYGEN SATURATION: 98 % | HEART RATE: 84 BPM | HEIGHT: 73 IN

## 2023-05-25 DIAGNOSIS — B37.9 CANDIDIASIS: ICD-10-CM

## 2023-05-25 DIAGNOSIS — R97.20 ELEVATED PSA: ICD-10-CM

## 2023-05-25 DIAGNOSIS — R39.9 LOWER URINARY TRACT SYMPTOMS (LUTS): Primary | ICD-10-CM

## 2023-05-25 LAB
BILIRUB BLD-MCNC: NEGATIVE MG/DL
CLARITY, POC: CLEAR
COLOR UR: YELLOW
EXPIRATION DATE: ABNORMAL
GLUCOSE UR STRIP-MCNC: NEGATIVE MG/DL
KETONES UR QL: NEGATIVE
LEUKOCYTE EST, POC: NEGATIVE
Lab: ABNORMAL
NITRITE UR-MCNC: NEGATIVE MG/ML
PH UR: 6 [PH] (ref 5–8)
PROT UR STRIP-MCNC: ABNORMAL MG/DL
RBC # UR STRIP: NEGATIVE /UL
SP GR UR: 1.03 (ref 1–1.03)
UROBILINOGEN UR QL: NORMAL

## 2023-05-25 RX ORDER — NYSTATIN 100000 [USP'U]/G
POWDER TOPICAL 2 TIMES DAILY
Qty: 30 G | Refills: 1 | Status: SHIPPED | OUTPATIENT
Start: 2023-05-25

## 2023-05-25 NOTE — PROGRESS NOTES
Chief Complaint   Patient presents with   • Yearly LUTS        HPI  Mr. Roman is a 78 y.o. male with history below in assessment, who presents for follow up.     At this visit no urinary complaints. Nocturia x2. Drinks diet mtn dew in evening    IPSS Questionnaire (AUA-7):           IPSS was 9    Past Medical History:   Diagnosis Date   • Aneurysm     aortic aneyrtysm ascending and transverse still has desending  watched by dr holloway at    • Arthritis    • Diabetes mellitus    • Elevated cholesterol    • GERD (gastroesophageal reflux disease)    • Gout    • Heart disease    • Hyperlipidemia    • Hypertension    • Temporal arteritis    • TIA (transient ischemic attack)     3 years ago   • Vocal cord paralysis     left during open heart surgery       Past Surgical History:   Procedure Laterality Date   • APPENDECTOMY     • ARTERIAL ANEURYSM REPAIR     • CHOLECYSTECTOMY  2001   • HERNIA REPAIR  1987   • HIP SURGERY Right 03/08/2021    total at [facility]   • KNEE SURGERY Right    • ORIF WRIST FRACTURE Left 6/27/2018    Procedure: OPEN REDUCTION INTERNAL FIXATION LEFT WRIST;  Surgeon: Narinder Lewis MD;  Location: [facility];  Service: Orthopedics   • THROAT SURGERY  12/30/2015         Current Outpatient Medications:   •  atenolol (TENORMIN) 100 MG tablet, Take 2 tablets by mouth Daily., Disp: 180 tablet, Rfl: 3  •  Eliquis 5 MG tablet tablet, TAKE 1 TABLET BY MOUTH EVERY 12 (TWELVE) HOURS., Disp: 60 tablet, Rfl: 11  •  glipizide (GLUCOTROL XL) 5 MG ER tablet, Take 1 tablet by mouth Daily., Disp: 90 tablet, Rfl: 3  •  hydroCHLOROthiazide (HYDRODIURIL) 25 MG tablet, TAKE ONE TABLET BY MOUTH EVERY DAY, Disp: 90 tablet, Rfl: 3  •  indomethacin (INDOCIN) 50 MG capsule, TAKE ONE CAPSULE BY MOUTH TWO TIMES A DAY, Disp: 180 capsule, Rfl: 1  •  losartan (COZAAR) 100 MG tablet, Take 1 tablet by mouth Daily. Indications: High Blood Pressure Disorder, diabetes, Disp: 90 tablet, Rfl: 3  •  Red Yeast Rice 600 MG capsule, Take  by mouth.,  "Disp: , Rfl:      Physical Exam  Visit Vitals  /78   Pulse 84   Temp 98.4 °F (36.9 °C)   Ht 185.4 cm (73\")   Wt 122 kg (269 lb)   SpO2 98%   BMI 35.49 kg/m²      On prostate exam he did not have any hard identifiable nodules.  Gland was medium size.  He did have candidiasis at the top of his crease of his bottom    Labs  Brief Urine Lab Results  (Last result in the past 365 days)      Color   Clarity   Blood   Leuk Est   Nitrite   Protein   CREAT   Urine HCG        05/25/23 1144 Yellow   Clear   Negative   Negative   Negative   3+                 Lab Results   Component Value Date    GLUCOSE 256 (H) 05/23/2023    CALCIUM 9.5 05/23/2023     05/23/2023    K 4.0 05/23/2023    CO2 28.0 05/23/2023     05/23/2023    BUN 24 (H) 05/23/2023    CREATININE 1.35 (H) 05/23/2023    EGFRIFAFRI 52 01/30/2023    EGFRIFNONA >60 05/18/2022    BCR 17.8 05/23/2023    ANIONGAP 9.0 05/23/2023       Lab Results   Component Value Date    WBC 4.75 05/23/2023    HGB 14.2 05/23/2023    HCT 41.8 05/23/2023    MCV 91.7 05/23/2023     05/23/2023       Lab Results   Component Value Date    PSA 4.220 (H) 05/23/2023    PSA 4.220 (H) 11/06/2020    PSA 3.800 11/07/2019           Radiographic Studies  CT Angiogram Chest  Result Date: 1/30/2023  Patent stent graft within the ascending and descending thoracic aorta. Aneurysmal sac is stable with no evidence of endoleak. Stable 44 mm aneurysmal dilatation of the distal descending thoracic aorta and suprarenal abdominal aorta. Resolution of the left pleural effusion. CRITICAL RESULT:   No COMMUNICATION: Per this report By electronically signing this report, I, the attending physician, attest that I have personally reviewed the images/data for the above examination(s) and agree with the final edited report. Dictated by Maverick Albright MD on 1/30/2023 12:20 PM Signed by Mary Miguel MD on 1/30/2023 2:04 PM      I have reviewed above labs and imaging.     Assessment  78 y.o. male " with history of chronic lower urinary tract symptoms, status post TURP over 15 years ago.  He now presents with elevated PSA. IPSS low, stable.  New diagnosis of perirectal candidiasis    Plan  1.  MRI prostate  2.  Nystatin powder for candidiasis above buttock

## 2023-05-25 NOTE — TELEPHONE ENCOUNTER
"  Caller: Bryan Roman \"Don\"    Relationship to patient: Self    Best call back number: [phone]    Patient is needing: PT CALLED FOR JAKE TO LET HER KNOW HIS MRI IS SCHEDULED. MRI IS ON  06/30/2023@3:45. PLEASE CALL WITH ANY QUESTIONS.  THANK YOU                  "

## 2023-06-05 ENCOUNTER — OFFICE VISIT (OUTPATIENT)
Dept: INTERNAL MEDICINE | Facility: CLINIC | Age: 78
End: 2023-06-05
Payer: MEDICARE

## 2023-06-05 VITALS
RESPIRATION RATE: 16 BRPM | SYSTOLIC BLOOD PRESSURE: 142 MMHG | HEART RATE: 83 BPM | BODY MASS INDEX: 36.15 KG/M2 | OXYGEN SATURATION: 96 % | HEIGHT: 73 IN | TEMPERATURE: 97.5 F | WEIGHT: 272.8 LBS | DIASTOLIC BLOOD PRESSURE: 80 MMHG

## 2023-06-05 DIAGNOSIS — M1A.00X0 IDIOPATHIC CHRONIC GOUT WITHOUT TOPHUS, UNSPECIFIED SITE: ICD-10-CM

## 2023-06-05 DIAGNOSIS — R97.20 RAISED PROSTATE SPECIFIC ANTIGEN: ICD-10-CM

## 2023-06-05 DIAGNOSIS — R17 HIGH BILIRUBIN: ICD-10-CM

## 2023-06-05 DIAGNOSIS — E78.5 HYPERLIPIDEMIA LDL GOAL <100: ICD-10-CM

## 2023-06-05 DIAGNOSIS — I10 ESSENTIAL HYPERTENSION: ICD-10-CM

## 2023-06-05 DIAGNOSIS — E11.59 TYPE 2 DIABETES MELLITUS WITH OTHER CIRCULATORY COMPLICATION, WITH LONG-TERM CURRENT USE OF INSULIN: Primary | ICD-10-CM

## 2023-06-05 DIAGNOSIS — N18.31 STAGE 3A CHRONIC KIDNEY DISEASE: ICD-10-CM

## 2023-06-05 DIAGNOSIS — E53.8 VITAMIN B12 DEFICIENCY: ICD-10-CM

## 2023-06-05 DIAGNOSIS — R42 DIZZINESS: ICD-10-CM

## 2023-06-05 DIAGNOSIS — E55.9 VITAMIN D DEFICIENCY: ICD-10-CM

## 2023-06-05 DIAGNOSIS — I48.21 PERMANENT ATRIAL FIBRILLATION: ICD-10-CM

## 2023-06-05 DIAGNOSIS — Z79.4 TYPE 2 DIABETES MELLITUS WITH OTHER CIRCULATORY COMPLICATION, WITH LONG-TERM CURRENT USE OF INSULIN: Primary | ICD-10-CM

## 2023-06-05 RX ORDER — CHOLECALCIFEROL (VITAMIN D3) 1250 MCG
50000 CAPSULE ORAL
Qty: 12 CAPSULE | Refills: 0 | Status: SHIPPED | OUTPATIENT
Start: 2023-06-05

## 2023-06-05 RX ORDER — FEBUXOSTAT 40 MG/1
40 TABLET, FILM COATED ORAL DAILY
Qty: 90 TABLET | Refills: 3 | Status: SHIPPED | OUTPATIENT
Start: 2023-06-05

## 2023-06-05 NOTE — PROGRESS NOTES
"Chief Complaint  Diabetes (4 month follow up) and Dizziness (Started a few months back, caused by sudden movements like getting out  of chairs, Don states it is slight )    Subjective        Bryan Roman presents to [facility]  History of Present Illness  Dizziness when he stands up at times.     Objective   Vital Signs:  /80 (BP Location: Left arm)   Pulse 83   Temp 97.5 °F (36.4 °C) (Temporal)   Resp 16   Ht 185.4 cm (73\")   Wt 124 kg (272 lb 12.8 oz)   SpO2 96%   BMI 35.99 kg/m²   Estimated body mass index is 35.99 kg/m² as calculated from the following:    Height as of this encounter: 185.4 cm (73\").    Weight as of this encounter: 124 kg (272 lb 12.8 oz).             Physical Exam  Vitals and nursing note reviewed.   Constitutional:       General: He is not in acute distress.     Appearance: Normal appearance. He is well-developed and well-groomed. He is obese. He is not ill-appearing, toxic-appearing or diaphoretic.   HENT:      Head: Normocephalic and atraumatic.      Right Ear: Hearing normal.      Left Ear: Hearing normal.   Eyes:      General: Lids are normal. No scleral icterus.        Right eye: No discharge.         Left eye: No discharge.      Extraocular Movements: Extraocular movements intact.   Cardiovascular:      Rate and Rhythm: Normal rate. Rhythm irregular.   Pulmonary:      Effort: Pulmonary effort is normal.      Breath sounds: Normal breath sounds.   Musculoskeletal:      Cervical back: Neck supple.   Skin:     Coloration: Skin is not jaundiced or pale.   Neurological:      General: No focal deficit present.      Mental Status: He is alert and oriented to person, place, and time.   Psychiatric:         Attention and Perception: Attention and perception normal.         Mood and Affect: Mood and affect normal.         Speech: Speech normal.         Behavior: Behavior normal. Behavior is cooperative.         Thought Content: Thought content " normal.         Cognition and Memory: Cognition and memory normal.         Judgment: Judgment normal.      Result Review :  The following data was reviewed by: Génesis Vaca MD on 06/05/2023:  Component      Latest Ref Rng 11/16/2021 5/23/2023   Vitamin B-12      232 - 1245 pg/mL 891  367      Component      Latest Ref Rng 5/23/2023   Methylmalonic Acid      0 - 378 nmol/L 410 (H)       Component      Latest Ref Rng 5/23/2023   Intrinsic Factor Antibodies      0.0 - 1.1 AU/mL 1.0    Parietal Cell Ab      0.0 - 20.0 Units 0.4      Component      Latest Ref Rng 5/23/2023   25 Hydroxy, Vitamin D      30.0 - 100.0 ng/ml 18.3 (L)       Component      Latest Ref Rng 11/16/2021 5/23/2023   Uric Acid      3.4 - 7.0 mg/dL 8.8 (H)  7.8 (H)       Lab Results   Component Value Date    HGBA1C 6.80 (H) 05/23/2023    HGBA1C 6.5 02/10/2023    HGBA1C 6.6 (H) 05/10/2022                   Assessment and Plan   Diagnoses and all orders for this visit:    1. Type 2 diabetes mellitus with other circulatory complication, with long-term current use of insulin (Primary)  Comments:  goal a1c <7.5, continue current treatments  Orders:  -     Hemoglobin A1c; Future    2. Dizziness  Comments:  stay hydrated, take time getting up from sitting to standing    3. Hyperlipidemia LDL goal <100  Comments:  continue red yeast rice    4. Vitamin D deficiency  -     Cholecalciferol (Vitamin D3) 1.25 MG (82362 UT) capsule; Take 1 capsule by mouth Every 7 (Seven) Days. Indications: Vitamin D Deficiency  Dispense: 12 capsule; Refill: 0  -     Vitamin D,25-Hydroxy; Future    5. Vitamin B12 deficiency  -     Cyanocobalamin 500 MCG sublingual tablet; Place 1 tablet under the tongue Daily. For low vitamin B12  Dispense: 90 each; Refill: 3  -     Vitamin B12 Deficiency Cascade; Future    6. Stage 3a chronic kidney disease  Comments:  decrease indomethacin use (from bid to daily)  Orders:  -     Vitamin D,25-Hydroxy; Future  -     Comprehensive Metabolic  Panel; Future    7. High bilirubin  Comments:  waxes/wanes over time, monitor  Orders:  -     Comprehensive Metabolic Panel; Future    8. Essential hypertension  Comments:  move losartan back to qhs dosing    9. Idiopathic chronic gout without tophus, unspecified site  Comments:  did not tolerate allopurinol in past  Orders:  -     febuxostat (Uloric) 40 MG tablet; Take 1 tablet by mouth Daily. To lower uric acid, gout risk  Dispense: 90 tablet; Refill: 3  -     Uric Acid; Future    10. Raised prostate specific antigen  Assessment & Plan:  Follow up with urology.      11. Permanent atrial fibrillation (HCC)  Comments:  Samples given of Eliquis, encouraged compliance to lower stroke risk  Orders:  -     apixaban (Eliquis) 5 MG tablet tablet; Take 1 tablet by mouth Every 12 (Twelve) Hours.  Dispense: 14 tablet; Refill: 0  -     apixaban (ELIQUIS) 5 MG tablet tablet; Take 1 tablet by mouth Every 12 (Twelve) Hours.  Dispense: 14 tablet; Refill: 0           I spent 40 minutes caring for Bryan on this date of service. This time includes time spent by me in the following activities:preparing for the visit, reviewing tests, performing a medically appropriate examination and/or evaluation , counseling and educating the patient/family/caregiver, ordering medications, tests, or procedures, and documenting information in the medical record  Follow Up   Return in about 14 weeks (around 9/11/2023) for diabetes, labs approx 1 week prior fasting not required.  Patient was given instructions and counseling regarding his condition or for health maintenance advice. Please see specific information pulled into the AVS if appropriate.

## 2023-06-06 NOTE — FLOWSHEET NOTE
Problem: Sleep Disturbance  Goal: Adequate Sleep/Rest  Outcome: Progressing   Goal Outcome Evaluation:       Patient is sound asleep and looks comfortable with a headphone on. He remains on SIO and no roommate order r/t  poor boundaries; labile mood and severe intrusiveness. He woke up past 0400 and walked back and forth in the hallway humming softly. Redirected and stayed in the lounge. He slept a total of 6.5 hours the whole night. Upon waking up, he had a cup of lemonade per request and then 1 tetra pack of milk. Later he had a cup of coffee. He went to the bathroom x 4 and voided freely.                  for Long term goals : 6-8 weeks  Long term goal 1: Achieve left wrist pain at or less than12/10 with routine daily activities. Long term goal 2: Achieve left elbow AROM: left wrist: flexion = 50, ext = 60. Long term goal 3: Achieve 4/5 left elbow and wrist strength. Long term goal 4: Achieve a Quick DASH score of 20 or less. Gradual progress; improving ROM. Treatment/Activity Tolerance:  [x] Patient tolerated treatment well [] Patient limited by fatigue  [] Patient limited by pain  [] Patient limited by other medical complications  [x] Other: Pt completed tx with no pain and improved AROM in left wrist as well as improved functional measures.     Pain after treatment:     0 /10    Prognosis: [x] Good [] Fair  [] Poor    Patient Requires Follow-up: [x] Yes  [] No    Plan:   [x] Continue per plan of care [] Alter current plan (see comments)  [] Plan of care initiated [] Hold pending MD visit [] Discharge    Plan for Next Session:        Electronically signed by:  Electronically signed by Kip Smith PT on 9/17/2018 at 11:30 AM

## 2023-06-07 RX ORDER — AMLODIPINE BESYLATE 10 MG/1
10 TABLET ORAL DAILY
Qty: 90 TABLET | Refills: 3 | Status: SHIPPED | OUTPATIENT
Start: 2023-06-07

## 2023-06-07 NOTE — TELEPHONE ENCOUNTER
Patient requests refill of amlodipine 10 MG sent to [facility] pharmacy [place], [place]. Patient is out of medication.

## 2023-08-14 ENCOUNTER — TELEPHONE (OUTPATIENT)
Dept: UROLOGY | Facility: CLINIC | Age: 78
End: 2023-08-14
Payer: MEDICARE

## 2023-08-14 NOTE — TELEPHONE ENCOUNTER
Caller: ANTONIO BRAND    Relationship to patient: SELF    Best call back number: [phone]    Patient is needing: PT WAS SCHEDULED FOR TRUS/BX ON 7/24/23 BUT TRUS MACHINE WAS DOWN. WHEN ARE WE GOING TO R/S THIS PT HE IS CONCERNED ABOUT THE DELAY.

## 2023-08-15 RX ORDER — INDOMETHACIN 50 MG/1
50 CAPSULE ORAL DAILY
Qty: 90 CAPSULE | Refills: 0 | Status: SHIPPED | OUTPATIENT
Start: 2023-08-15

## 2023-08-15 NOTE — TELEPHONE ENCOUNTER
Rx Refill Note  Requested Prescriptions     Pending Prescriptions Disp Refills    indomethacin (INDOCIN) 50 MG capsule [Pharmacy Med Name: INDOMETHACIN 50 MG CAPS 50 Capsule] 180 capsule 1     Sig: TAKE ONE CAPSULE BY MOUTH TWO TIMES A DAY      Last office visit with prescribing clinician: 6/5/2023   Next office visit with prescribing clinician: 9/12/2023       Marsha Brown MA  08/15/23, 14:10 EDT

## 2023-08-24 ENCOUNTER — PROCEDURE VISIT (OUTPATIENT)
Dept: UROLOGY | Facility: CLINIC | Age: 78
End: 2023-08-24
Payer: MEDICARE

## 2023-08-24 DIAGNOSIS — R97.20 ELEVATED PSA: Primary | ICD-10-CM

## 2023-08-24 NOTE — PROGRESS NOTES
TRUS BIOPSY OF PROSTATE    Preoperative diagnosis  Elevated PSA    Postoperative diagnosis  Elevated PSA    Procedure  1.  Transrectal ultrasound of the prostate  2.  Transrectal ultrasound guidance of needle biopsy  3.  Prostate biopsy    Attending Surgeon  Jassi Godwin MD    Anesthesia  2% lidocaine jelly, intrarectal instillation, 10mL  1% lidocaine solution, periprostatic injection, 10mL    Complications  None    Specimen  Prostate biopsy x 18    Indications  Mr. Roman is a 78 y.o. year old male with an elevated PSA:   Lab Results   Component Value Date    PSA 4.220 (H) 05/23/2023    PSA 4.220 (H) 11/06/2020    PSA 3.800 11/07/2019       MRI demonstrates a significant 13 mm left posterolateral base lesion PI-RADS 4.    After discussing his options, the patient decided to proceed with prostate biopsy.  Informed consent was obtained. Possible complications were discussed with the patient during his last visit including, but not limited to, hematuria, hematochezia, prostatitis, urinary tract infection, sepsis, and urinary retention.  He did start the prescribed oral antibiotic regimen.    Procedure  The patient was positioned and prepped in a left lateral position with lower extremities flexed.  Lidocaine jelly, 2%, was injected per rectum. A digital rectal exam was performed.The [employer] ethority rectal ultrasound probe was slowly introduced into the rectum without difficulty.  The prostate and seminal vesicles were inspected systematically using cross and sagittal views with the ultrasound.  The dimensions of the prostate were measured, for a calculated volume of 34 mL.  PSA density was 0.12.  Using a true cut 14 Fr biopsy needle, 18 prostate cores were collected. The specific locations were the following: left lateral base, left lateral mid, left lateral apex, left medial base, left medial mid, and left medial apex, right lateral base, right lateral mid, right lateral apex, right medial base, right medial mid,  right medial apex, and right and left transition zones. The rectal ultrasound probe was removed.  A SANDI was again performed revealing little blood in the rectum.  The patient tolerated the procedure well.    Plan  1.  The patient was instructed to drink plenty of fluids and warned about possible complications and side effects including, but not limited to, blood in the urine, stool and semen as well as bloodstream infection.  He was instructed to call the office if there are any issues, especially fevers or flu-like symptoms.    2.  Continue antibiotic for a total of 3 days.  3.  The patient will return to clinic in approximately 1 week for discussion of the pathological report.

## 2023-08-25 ENCOUNTER — TELEPHONE (OUTPATIENT)
Dept: UROLOGY | Facility: CLINIC | Age: 78
End: 2023-08-25

## 2023-08-25 LAB — REF LAB TEST METHOD: NORMAL

## 2023-08-25 NOTE — TELEPHONE ENCOUNTER
Hub staff attempted to follow warm transfer process and was unsuccessful     Caller: ONEL    Relationship to patient: RECEIVED A CALL FROM  PATHOLOGY CALLING TO GIVE PATHOLOGY REPORT. ONEL WILL FAX REPORT TO OUR OFFICE.     Best call back number: [phone]

## 2023-08-28 ENCOUNTER — HOSPITAL ENCOUNTER (EMERGENCY)
Facility: HOSPITAL | Age: 78
Discharge: HOME | End: 2023-08-28
Attending: EMERGENCY MEDICINE
Payer: MEDICARE

## 2023-08-28 ENCOUNTER — TELEPHONE (OUTPATIENT)
Dept: INTERNAL MEDICINE | Facility: CLINIC | Age: 78
End: 2023-08-28

## 2023-08-28 VITALS
HEIGHT: 73 IN | BODY MASS INDEX: 35.25 KG/M2 | OXYGEN SATURATION: 97 % | RESPIRATION RATE: 20 BRPM | HEART RATE: 52 BPM | SYSTOLIC BLOOD PRESSURE: 152 MMHG | DIASTOLIC BLOOD PRESSURE: 91 MMHG | WEIGHT: 266 LBS | TEMPERATURE: 98.1 F

## 2023-08-28 DIAGNOSIS — U07.1 COVID-19: Primary | ICD-10-CM

## 2023-08-28 PROCEDURE — 99282 EMERGENCY DEPT VISIT SF MDM: CPT

## 2023-08-28 NOTE — TELEPHONE ENCOUNTER
Patient was seen at ED today for positive COVID test at home. I know you treat symptoms with OTC meds. Would you like to make any adjustments or any other recommendations for elevated BP?

## 2023-08-28 NOTE — TELEPHONE ENCOUNTER
"    Caller: Bryan Roman \"Don\"    Relationship to patient: Self    Best call back number: [phone]     Date of exposure: UNKNOWN    Date of positive COVID19 test: 08.28.23    COVID19 symptoms: HEADACHE, COUGH, CONGESTION, ELEVATED BLOOD PRESSURE 152/91, O2 97 PERCENT    What is the patients preferred pharmacy: [facility]  PLEASE ADVISE  "

## 2023-08-28 NOTE — ED PROVIDER NOTES
minimize close contact with her as much as safety is concerned. Given strict return precautions to the emergency department       I am the Primary Clinician of Record.     FINAL IMPRESSION      1. COVID-19          DISPOSITION/PLAN     DISPOSITION Decision To Discharge 08/28/2023 12:59:25 PM      PATIENT REFERRED TO:  ISMA Dubon - CNP  [address] [facility]  [phone]    Schedule an appointment as soon as possible for a visit   If symptoms worsen      DISCHARGE MEDICATIONS:  New Prescriptions    No medications on file       DISCONTINUED MEDICATIONS:  Discontinued Medications    No medications on file              (Please note that portions of this note were completed with a voice recognition program.  Efforts were made to edit the dictations but occasionally words are mis-transcribed.)    Brown Her MD (electronically signed)            Juan Brandt MD  08/28/23 3797

## 2023-08-29 ENCOUNTER — TELEPHONE (OUTPATIENT)
Dept: UROLOGY | Facility: CLINIC | Age: 78
End: 2023-08-29

## 2023-08-29 NOTE — TELEPHONE ENCOUNTER
Spoke with patient, went over quarantine guidelines and signs and symptoms that he should proceed back to the ED. Advised to just treat his symptoms at this time with OTC medications. Patient is agreeable to this.

## 2023-08-29 NOTE — TELEPHONE ENCOUNTER
"  Caller: Bryan Roman \"Don\"     Relationship: SELF    Best call back number: [phone]    What is the best time to reach you: AFTERNOON    Who are you requesting to speak with (clinical staff, provider,  specific staff member): CLINICAL STAFF FOR DR. SMITH    What was the call regarding: PT TESTED POSITIVE FOR COVID. WANTS TO RESCHEDULE.  PT ALSO WANTS TO KNOW IF SOMEONE CAN CALL HIM BACK.       "

## 2023-08-29 NOTE — CARE COORDINATION
Placed call to patient regarding recent ER visit. Patient stated he is doing ok at this time, asked since he has been around his children and grandchildren should they be tested. Stated to patient it would not hurt for them to get tested also if they have symptoms they should get tested. Also stated to patient for him to make a follow up appointment with family doctor once his quarantine time is over. Asked patient if he had any other questions or concerns. Patient stated not at this time.

## 2023-08-31 DIAGNOSIS — I48.21 PERMANENT ATRIAL FIBRILLATION: ICD-10-CM

## 2023-08-31 RX ORDER — APIXABAN 5 MG/1
TABLET, FILM COATED ORAL
Qty: 60 TABLET | Refills: 11 | Status: SHIPPED | OUTPATIENT
Start: 2023-08-31

## 2023-09-11 ENCOUNTER — TELEPHONE (OUTPATIENT)
Dept: UROLOGY | Facility: CLINIC | Age: 78
End: 2023-09-11

## 2023-09-11 NOTE — TELEPHONE ENCOUNTER
Caller: LANIE BRAND     Relationship: SELF     Best call back number: [phone]     Patient is needing: INCOMING CALL FROM PT. PT SAID HE CANNOT FIGURE OUT MYCHART AND WANTS TO COME IN PERSON INSTEAD. PT IS CONCERNED BECAUSE HE DOES NOT KNOW WHAT IS ON HIS BIOPSY. PT WANTS TO BE RESCHEDULED FOR AN OFFICE VISIT ASAP.

## 2023-09-12 ENCOUNTER — OFFICE VISIT (OUTPATIENT)
Dept: INTERNAL MEDICINE | Facility: CLINIC | Age: 78
End: 2023-09-12
Payer: MEDICARE

## 2023-09-12 VITALS
WEIGHT: 264.8 LBS | DIASTOLIC BLOOD PRESSURE: 78 MMHG | SYSTOLIC BLOOD PRESSURE: 144 MMHG | HEART RATE: 57 BPM | BODY MASS INDEX: 33.98 KG/M2 | OXYGEN SATURATION: 98 % | HEIGHT: 74 IN | TEMPERATURE: 97.1 F

## 2023-09-12 DIAGNOSIS — Z79.4 TYPE 2 DIABETES MELLITUS WITH OTHER CIRCULATORY COMPLICATION, WITH LONG-TERM CURRENT USE OF INSULIN: Primary | ICD-10-CM

## 2023-09-12 DIAGNOSIS — I10 ESSENTIAL HYPERTENSION: ICD-10-CM

## 2023-09-12 DIAGNOSIS — C61 PROSTATE CANCER: ICD-10-CM

## 2023-09-12 DIAGNOSIS — E11.59 TYPE 2 DIABETES MELLITUS WITH OTHER CIRCULATORY COMPLICATION, WITH LONG-TERM CURRENT USE OF INSULIN: Primary | ICD-10-CM

## 2023-09-12 DIAGNOSIS — R42 EPISODIC LIGHTHEADEDNESS: ICD-10-CM

## 2023-09-12 DIAGNOSIS — I48.21 PERMANENT ATRIAL FIBRILLATION: ICD-10-CM

## 2023-09-12 LAB
EXPIRATION DATE: NORMAL
HBA1C MFR BLD: 6.7 %
Lab: NORMAL

## 2023-09-12 PROCEDURE — 3044F HG A1C LEVEL LT 7.0%: CPT | Performed by: FAMILY MEDICINE

## 2023-09-12 PROCEDURE — 99214 OFFICE O/P EST MOD 30 MIN: CPT | Performed by: FAMILY MEDICINE

## 2023-09-12 PROCEDURE — 1159F MED LIST DOCD IN RCRD: CPT | Performed by: FAMILY MEDICINE

## 2023-09-12 PROCEDURE — 3078F DIAST BP <80 MM HG: CPT | Performed by: FAMILY MEDICINE

## 2023-09-12 PROCEDURE — 83036 HEMOGLOBIN GLYCOSYLATED A1C: CPT | Performed by: FAMILY MEDICINE

## 2023-09-12 PROCEDURE — 3077F SYST BP >= 140 MM HG: CPT | Performed by: FAMILY MEDICINE

## 2023-09-12 PROCEDURE — 1160F RVW MEDS BY RX/DR IN RCRD: CPT | Performed by: FAMILY MEDICINE

## 2023-09-12 NOTE — PROGRESS NOTES
"Chief Complaint  Diabetes    Subjective        Bryan Roman presents to [facility]  History of Present Illness  Frequent lightheadedness.    Not wanting to pursue surgery for prostate cancer if he can avoid. Would like second opinion urology with Dr. Rizvi.     Objective   Vital Signs:  /78   Pulse 57   Temp 97.1 °F (36.2 °C) (Infrared)   Ht 188 cm (74.02\")   Wt 120 kg (264 lb 12.8 oz)   SpO2 98%   BMI 33.98 kg/m²   Estimated body mass index is 33.98 kg/m² as calculated from the following:    Height as of this encounter: 188 cm (74.02\").    Weight as of this encounter: 120 kg (264 lb 12.8 oz).               Physical Exam  Vitals and nursing note reviewed.   Constitutional:       General: He is not in acute distress.     Appearance: Normal appearance. He is well-developed and well-groomed. He is obese. He is not ill-appearing, toxic-appearing or diaphoretic.   HENT:      Head: Normocephalic and atraumatic.      Right Ear: Hearing normal.      Left Ear: Hearing normal.   Eyes:      General: Lids are normal. No scleral icterus.        Right eye: No discharge.         Left eye: No discharge.      Extraocular Movements: Extraocular movements intact.   Cardiovascular:      Rate and Rhythm: Bradycardia present. Rhythm irregular.   Pulmonary:      Effort: Pulmonary effort is normal.   Musculoskeletal:      Cervical back: Neck supple.   Skin:     Coloration: Skin is not jaundiced or pale.   Neurological:      General: No focal deficit present.      Mental Status: He is alert and oriented to person, place, and time.   Psychiatric:         Attention and Perception: Attention and perception normal.         Mood and Affect: Mood and affect normal.         Speech: Speech normal.         Behavior: Behavior normal. Behavior is cooperative.         Thought Content: Thought content normal.         Cognition and Memory: Cognition and memory normal.         Judgment: Judgment normal.    "   Result Review :  The following data was reviewed by: Génesis Vaca MD on 09/12/2023:  Lab Results   Component Value Date    HGBA1C 6.7 09/12/2023    HGBA1C 6.80 (H) 05/23/2023    HGBA1C 6.5 02/10/2023      TISSUE EXAM, [facility] ([facility]) (08/24/2023 10:00)                  Assessment and Plan   Diagnoses and all orders for this visit:    1. Type 2 diabetes mellitus with other circulatory complication, with long-term current use of insulin (Primary)  Assessment & Plan:  Diabetes is improving with treatment.   Continue current treatment regimen.  Diabetes will be reassessed in 3 months.    Orders:  -     POC Glycosylated Hemoglobin (Hb A1C)    2. Prostate cancer  Comments:  discussed biopsy findings, patient reports a friend recommends Dr. Rizvi and he requests second opinion  Orders:  -     Cancel: Ambulatory Referral to Urology  -     Ambulatory Referral to Urology    3. Permanent atrial fibrillation  -     apixaban (ELIQUIS) 5 MG tablet tablet; Take 1 tablet by mouth 2 (Two) Times a Day.  Dispense: 180 tablet; Refill: 1  -     apixaban (Eliquis) 5 MG tablet tablet; Take 1 tablet by mouth Every 12 (Twelve) Hours for 14 days.  Dispense: 28 tablet; Refill: 0    4. Episodic lightheadedness  Comments:  stay hydrated; slow down position changes; monitor bp    5. Essential hypertension  Assessment & Plan:  Hypertension is improving with treatment.  Continue current treatment regimen.  Ambulatory blood pressure monitoring.  Blood pressure will be reassessed at the next regular appointment.               Follow Up   Return in about 3 months (around 12/16/2023) for Diabetes follow up.  Patient was given instructions and counseling regarding his condition or for health maintenance advice. Please see specific information pulled into the AVS if appropriate.

## 2023-09-12 NOTE — ASSESSMENT & PLAN NOTE
Hypertension is improving with treatment.  Continue current treatment regimen.  Ambulatory blood pressure monitoring.  Blood pressure will be reassessed at the next regular appointment.

## 2023-09-15 ENCOUNTER — TELEPHONE (OUTPATIENT)
Dept: INTERNAL MEDICINE | Facility: CLINIC | Age: 78
End: 2023-09-15
Payer: MEDICARE

## 2023-09-15 NOTE — TELEPHONE ENCOUNTER
"Caller: Bryan Roman \"Don\"    Relationship to patient: Self    Best call back number: [phone]     PATIENT STATES THAT HE SAW DR PURVIS ON TUESDAY AND HE COULD NOT THINK OF THE OTHER ISSUE HE WANTED TO TALK WITH HER ABOUT, SO SHE TOLD HIM TO CALL BACK WHEN HE COULD THINK OF IT.  HE HAS BEEN HAVING PAIN IN HIS RIGHT SIDE WHEN HE EATS.  "

## 2023-10-11 DIAGNOSIS — I15.2 HYPERTENSION ASSOCIATED WITH DIABETES: ICD-10-CM

## 2023-10-11 DIAGNOSIS — E11.59 HYPERTENSION ASSOCIATED WITH DIABETES: ICD-10-CM

## 2023-10-11 RX ORDER — HYDROCHLOROTHIAZIDE 25 MG/1
TABLET ORAL
Qty: 90 TABLET | Refills: 3 | Status: SHIPPED | OUTPATIENT
Start: 2023-10-11

## 2023-10-11 NOTE — TELEPHONE ENCOUNTER
Rx Refill Note  Requested Prescriptions     Pending Prescriptions Disp Refills    hydroCHLOROthiazide (HYDRODIURIL) 25 MG tablet [Pharmacy Med Name: HYDROCHLOROTHIAZIDE 25 MG T 25 Tablet] 90 tablet 3     Sig: TAKE ONE TABLET BY MOUTH EVERY DAY      Last office visit with prescribing clinician: 9/12/2023   Next office visit with prescribing clinician: 11/1/2023       Marsha Brown MA  10/11/23, 14:53 EDT

## 2023-10-18 ENCOUNTER — TELEPHONE (OUTPATIENT)
Dept: CARDIOLOGY | Facility: CLINIC | Age: 78
End: 2023-10-18
Payer: MEDICARE

## 2023-10-18 NOTE — TELEPHONE ENCOUNTER
Patient may undergo prostatectomy with pelvic lymph node dissection on 10/24 with Dr. Nj.  Can hold Eliquis for 3 days and resume postop day 1 or when surgeon feels reasonable.

## 2023-11-02 ENCOUNTER — TELEPHONE (OUTPATIENT)
Dept: INTERNAL MEDICINE | Facility: CLINIC | Age: 78
End: 2023-11-02

## 2023-11-02 RX ORDER — COLCHICINE 0.6 MG/1
TABLET ORAL
Qty: 3 TABLET | Refills: 2 | Status: SHIPPED | OUTPATIENT
Start: 2023-11-02

## 2023-11-02 NOTE — TELEPHONE ENCOUNTER
"Caller: Bryan Roman \"Don\"    Relationship: Self    Best call back number: [phone]     What medication are you requesting: MEDICATION FOR GOUT LEFT FOOT    What are your current symptoms: PAIN IN LEFT FOOT    How long have you been experiencing symptoms: 11.01.23    Have you had these symptoms before:    [x] Yes  [] No    Have you been treated for these symptoms before:   [x] Yes  [] No    If a prescription is needed, what is your preferred pharmacy and phone number: [address] [phone] [facility] [phone]      Additional notes: PATIENT ADVISES THAT HIS PROSTATE SURGEON TOOK HIM OFF THE INDOMETHACIN SINCE SURGERY AND HE NEEDS SOMETHING THAT HE CAN TAKE TO HELP. PATIENT HAS BEEN WITHOUT GOUT MEDICATION FOR A WEEK. PLEASE CALL PATIENT WHEN MEDICATION HAS BEEN APPROVED OR DENIED.           "

## 2023-11-22 ENCOUNTER — OFFICE VISIT (OUTPATIENT)
Dept: INTERNAL MEDICINE | Facility: CLINIC | Age: 78
End: 2023-11-22
Payer: MEDICARE

## 2023-11-22 VITALS
DIASTOLIC BLOOD PRESSURE: 72 MMHG | OXYGEN SATURATION: 98 % | HEIGHT: 74 IN | HEART RATE: 83 BPM | SYSTOLIC BLOOD PRESSURE: 128 MMHG | WEIGHT: 259 LBS | TEMPERATURE: 97.8 F | BODY MASS INDEX: 33.24 KG/M2 | RESPIRATION RATE: 16 BRPM

## 2023-11-22 DIAGNOSIS — Z79.4 TYPE 2 DIABETES MELLITUS WITH OTHER CIRCULATORY COMPLICATION, WITH LONG-TERM CURRENT USE OF INSULIN: ICD-10-CM

## 2023-11-22 DIAGNOSIS — N39.0 URINARY TRACT INFECTION WITH HEMATURIA, SITE UNSPECIFIED: Primary | ICD-10-CM

## 2023-11-22 DIAGNOSIS — I15.2 HYPERTENSION ASSOCIATED WITH DIABETES: ICD-10-CM

## 2023-11-22 DIAGNOSIS — E11.59 TYPE 2 DIABETES MELLITUS WITH OTHER CIRCULATORY COMPLICATION, WITH LONG-TERM CURRENT USE OF INSULIN: ICD-10-CM

## 2023-11-22 DIAGNOSIS — C61 PROSTATE CANCER: ICD-10-CM

## 2023-11-22 DIAGNOSIS — R31.9 URINARY TRACT INFECTION WITH HEMATURIA, SITE UNSPECIFIED: Primary | ICD-10-CM

## 2023-11-22 DIAGNOSIS — E11.59 HYPERTENSION ASSOCIATED WITH DIABETES: ICD-10-CM

## 2023-11-22 DIAGNOSIS — R32 URINARY INCONTINENCE, UNSPECIFIED TYPE: ICD-10-CM

## 2023-11-22 DIAGNOSIS — M54.50 ACUTE BILATERAL LOW BACK PAIN, UNSPECIFIED WHETHER SCIATICA PRESENT: ICD-10-CM

## 2023-11-22 DIAGNOSIS — R31.9 HEMATURIA, UNSPECIFIED TYPE: ICD-10-CM

## 2023-11-22 LAB
BILIRUB BLD-MCNC: ABNORMAL MG/DL
CLARITY, POC: ABNORMAL
COLOR UR: ABNORMAL
EXPIRATION DATE: ABNORMAL
GLUCOSE UR STRIP-MCNC: NEGATIVE MG/DL
KETONES UR QL: NEGATIVE
LEUKOCYTE EST, POC: ABNORMAL
Lab: ABNORMAL
NITRITE UR-MCNC: NEGATIVE MG/ML
PH UR: 6 [PH] (ref 5–8)
PROT UR STRIP-MCNC: ABNORMAL MG/DL
RBC # UR STRIP: ABNORMAL /UL
SP GR UR: 1.03 (ref 1–1.03)
UROBILINOGEN UR QL: NORMAL

## 2023-11-22 PROCEDURE — 1159F MED LIST DOCD IN RCRD: CPT | Performed by: NURSE PRACTITIONER

## 2023-11-22 PROCEDURE — 81003 URINALYSIS AUTO W/O SCOPE: CPT | Performed by: NURSE PRACTITIONER

## 2023-11-22 PROCEDURE — 3078F DIAST BP <80 MM HG: CPT | Performed by: NURSE PRACTITIONER

## 2023-11-22 PROCEDURE — 99214 OFFICE O/P EST MOD 30 MIN: CPT | Performed by: NURSE PRACTITIONER

## 2023-11-22 PROCEDURE — 3074F SYST BP LT 130 MM HG: CPT | Performed by: NURSE PRACTITIONER

## 2023-11-22 PROCEDURE — 1160F RVW MEDS BY RX/DR IN RCRD: CPT | Performed by: NURSE PRACTITIONER

## 2023-11-22 RX ORDER — CIPROFLOXACIN 500 MG/1
500 TABLET, FILM COATED ORAL 2 TIMES DAILY
Qty: 6 TABLET | Refills: 0 | Status: SHIPPED | OUTPATIENT
Start: 2023-11-22 | End: 2023-11-25

## 2023-11-22 NOTE — PROGRESS NOTES
Office Visit      Patient Name: Bryan Roman  : 1945   MRN: [id number]     Chief Complaint:    Chief Complaint   Patient presents with    Back Pain     Lower back pain, started after his prostate surgery        History of Present Illness: Bryan Roman is a 78 y.o. male who is here today with increased urinary incontinence, urinary frequency, dysuria for the past few days.  His lower back has been hurting since having a prostatectomy with pelvic lymph node dissection, pelvic lymphadenopathy robotic on 10/24/2023.  He has occasional difficulty initiating urine stream.  No fever, chills, discharge from penis, genital swelling.   T2DM: taking medication as prescribed. Patient denies foot ulcerations, polydipsia, polyphagia, visual disturbances.   HTN, Afib: taking medication as prescribed.  Denies chest pain, dyspnea, orthopnea, palpitations, lower extremity edema, confusion, headaches, weakness, visual disturbances.      Subjective      I have reviewed and the following portions of the patient's history were updated as appropriate: past family history, past medical history, past social history, past surgical history and problem list.      Current Outpatient Medications:     amLODIPine (Norvasc) 10 MG tablet, Take 1 tablet by mouth Daily. For high blood pressure., Disp: 90 tablet, Rfl: 3    apixaban (Eliquis) 5 MG tablet tablet, Take 1 tablet by mouth Every 12 (Twelve) Hours., Disp: 14 tablet, Rfl: 0    apixaban (ELIQUIS) 5 MG tablet tablet, Take 1 tablet by mouth 2 (Two) Times a Day., Disp: 180 tablet, Rfl: 1    atenolol (TENORMIN) 100 MG tablet, Take 2 tablets by mouth Daily., Disp: 180 tablet, Rfl: 3    Cholecalciferol (Vitamin D3) 1.25 MG (81197 UT) capsule, Take 1 capsule by mouth Every 7 (Seven) Days. Indications: Vitamin D Deficiency, Disp: 12 capsule, Rfl: 0    colchicine (Colcrys) 0.6 MG tablet, TAKE 1.2 MG (2 TAB) PO FOLLOWED BY 0.6 MG (1 TAB) IN ONE HOUR FOR GOUT FLARE, Disp: 3 tablet,  "Rfl: 2    Cyanocobalamin 500 MCG sublingual tablet, Place 1 tablet under the tongue Daily. For low vitamin B12, Disp: 90 each, Rfl: 3    glipizide (GLUCOTROL XL) 5 MG ER tablet, Take 1 tablet by mouth Daily., Disp: 90 tablet, Rfl: 3    hydroCHLOROthiazide (HYDRODIURIL) 25 MG tablet, TAKE ONE TABLET BY MOUTH EVERY DAY, Disp: 90 tablet, Rfl: 3    indomethacin (INDOCIN) 50 MG capsule, Take 1 capsule by mouth Daily., Disp: 90 capsule, Rfl: 0    losartan (COZAAR) 100 MG tablet, Take 1 tablet by mouth Daily. Indications: High Blood Pressure Disorder, diabetes, Disp: 90 tablet, Rfl: 3    Red Yeast Rice 600 MG capsule, Take  by mouth., Disp: , Rfl:     nitrofurantoin, macrocrystal-monohydrate, (Macrobid) 100 MG capsule, Take 1 capsule by mouth 2 (Two) Times a Day for 5 days., Disp: 10 capsule, Rfl: 0    Allergies   Allergen Reactions    Ampicillin Rash    Lisinopril Unknown (See Comments)     Pt does not know--says wife knows      Niacin And Related Rash    Norco [Hydrocodone-Acetaminophen] Itching    Penicillins Rash       Objective     Physical Exam:  Vital Signs:   Vitals:    11/22/23 1151   BP: 128/72   BP Location: Left arm   Patient Position: Sitting   Cuff Size: Adult   Pulse: 83   Resp: 16   Temp: 97.8 °F (36.6 °C)   TempSrc: Temporal   SpO2: 98%   Weight: 117 kg (259 lb)   Height: 188 cm (74\")     Body mass index is 33.25 kg/m².         Physical Exam  Constitutional:       Appearance: He is not ill-appearing.   HENT:      Head: Normocephalic.      Right Ear: External ear normal.      Left Ear: External ear normal.   Eyes:      Conjunctiva/sclera: Conjunctivae normal.      Pupils: Pupils are equal, round, and reactive to light.   Cardiovascular:      Rate and Rhythm: Normal rate and regular rhythm.      Pulses:           Radial pulses are 2+ on the right side and 2+ on the left side.        Dorsalis pedis pulses are 2+ on the right side and 2+ on the left side.      Heart sounds: Normal heart sounds.   Pulmonary:    "   Effort: Pulmonary effort is normal.      Breath sounds: Normal breath sounds.   Abdominal:      General: There is no distension.      Tenderness: There is abdominal tenderness. There is no right CVA tenderness or left CVA tenderness.   Musculoskeletal:      Cervical back: Normal range of motion and neck supple.   Skin:     General: Skin is warm.      Capillary Refill: Capillary refill takes less than 2 seconds.   Neurological:      Mental Status: He is alert and oriented to person, place, and time.      Coordination: Coordination normal.      Gait: Gait normal.   Psychiatric:         Attention and Perception: Attention normal.         Mood and Affect: Mood and affect normal.         Speech: Speech normal.         Behavior: Behavior normal.             Assessment / Plan      Assessment/Plan:   Diagnoses and all orders for this visit:    1. Urinary tract infection with hematuria, site unspecified (Primary)  -     nitrofurantoin, macrocrystal-monohydrate, (Macrobid) 100 MG capsule; Take 1 capsule by mouth 2 (Two) Times a Day for 5 days.  Dispense: 10 capsule; Refill: 0    2. Urinary incontinence, unspecified type  -     POCT urinalysis dipstick, automated  -     Urine Culture - Urine, Urine, Clean Catch; Future    3. Acute bilateral low back pain, unspecified whether sciatica present  -     POCT urinalysis dipstick, automated  -     Urine Culture - Urine, Urine, Clean Catch; Future    4. Hematuria, unspecified type  -     ciprofloxacin (Cipro) 500 MG tablet; Take 1 tablet by mouth 2 (Two) Times a Day for 3 days.  Dispense: 6 tablet; Refill: 0    5. Type 2 diabetes mellitus with other circulatory complication, with long-term current use of insulin        - Follow diabetic diet        - Monitor blood sugars as discussed, to better assess trends and glycemic response to certain food, drink, activities.  Advised fasting blood glucose should be < 130, 2 hours post-prandial should be < 180        - See eye doctor annually  or as discussed        - Wear protective foot wear/no bare feet        - Check feet regularly for calluses or ulcers        - Discussed risk of poorly controlled diabetes and long-term complications        - Exercise as tolerated for 30-45 minutes most days of the week. Gradually increase daily exercise if not currently active.        - Take all medications as prescribed    6. Hypertension associated with diabetes        - Follow heart healthy diet.  Keep sodium intake < 1500 mg per day.  Avoid processed & fast foods.          - Exercise as tolerated, with a goal of 30 minutes of moderate exercise most days.         - Take medications as prescribed.    7. Prostate cancer        - Follow up with urology and oncology as scheduled         Follow Up:   Return if symptoms worsen or fail to improve.    Patient was given instructions and counseling regarding his condition or for health maintenance advice. Please see specific information pulled into the AVS if appropriate.       Primary [facility] [address] [place]     Please note that portions of this note may have been completed with a voice recognition program. Efforts were made to edit dictation, but occasionally words are mistranscribed.

## 2023-11-24 LAB
BACTERIA UR CULT: NO GROWTH
BACTERIA UR CULT: NORMAL

## 2023-11-29 ENCOUNTER — TELEPHONE (OUTPATIENT)
Dept: INTERNAL MEDICINE | Facility: CLINIC | Age: 78
End: 2023-11-29
Payer: MEDICARE

## 2023-11-29 NOTE — TELEPHONE ENCOUNTER
"  Caller: Bryan Roman \"Don\"    Relationship: Self    Best call back number: [phone]     Caller requesting test results: SELF    What test was performed: URINE TEST    When was the test performed: 11-22-23    Where was the test performed: IN THE OFFICE  "

## 2023-11-29 NOTE — TELEPHONE ENCOUNTER
Called Jh, verbally informed she has not received the full culture result yet but once she does and reviews this we will call him with the results. He verbalized understanding and stated his back is feeling a bit better.    Nothing further needed with this encounter at this time.

## 2023-12-01 RX ORDER — NITROFURANTOIN 25; 75 MG/1; MG/1
100 CAPSULE ORAL 2 TIMES DAILY
Qty: 10 CAPSULE | Refills: 0 | Status: SHIPPED | OUTPATIENT
Start: 2023-12-01 | End: 2023-12-06

## 2023-12-08 DIAGNOSIS — I48.21 PERMANENT ATRIAL FIBRILLATION: ICD-10-CM

## 2023-12-08 DIAGNOSIS — I10 ESSENTIAL HYPERTENSION: ICD-10-CM

## 2023-12-08 DIAGNOSIS — E11.59 TYPE 2 DIABETES MELLITUS WITH OTHER CIRCULATORY COMPLICATION, WITHOUT LONG-TERM CURRENT USE OF INSULIN: ICD-10-CM

## 2023-12-08 RX ORDER — ATENOLOL 100 MG/1
200 TABLET ORAL DAILY
Qty: 180 TABLET | Refills: 3 | Status: SHIPPED | OUTPATIENT
Start: 2023-12-08

## 2023-12-08 RX ORDER — GLIPIZIDE 5 MG/1
5 TABLET, FILM COATED, EXTENDED RELEASE ORAL DAILY
Qty: 90 TABLET | Refills: 3 | Status: SHIPPED | OUTPATIENT
Start: 2023-12-08

## 2023-12-19 ENCOUNTER — OFFICE VISIT (OUTPATIENT)
Dept: INTERNAL MEDICINE | Facility: CLINIC | Age: 78
End: 2023-12-19
Payer: MEDICARE

## 2023-12-19 VITALS
RESPIRATION RATE: 16 BRPM | BODY MASS INDEX: 33.5 KG/M2 | HEIGHT: 74 IN | OXYGEN SATURATION: 97 % | HEART RATE: 50 BPM | TEMPERATURE: 97 F | WEIGHT: 261 LBS | DIASTOLIC BLOOD PRESSURE: 76 MMHG | SYSTOLIC BLOOD PRESSURE: 120 MMHG

## 2023-12-19 DIAGNOSIS — Z85.46 HISTORY OF PROSTATE CANCER: ICD-10-CM

## 2023-12-19 DIAGNOSIS — R39.9 URINARY SYMPTOM OR SIGN: Primary | ICD-10-CM

## 2023-12-19 DIAGNOSIS — M1A.00X0 IDIOPATHIC CHRONIC GOUT WITHOUT TOPHUS, UNSPECIFIED SITE: ICD-10-CM

## 2023-12-19 DIAGNOSIS — M54.50 RIGHT LOW BACK PAIN, UNSPECIFIED CHRONICITY, UNSPECIFIED WHETHER SCIATICA PRESENT: ICD-10-CM

## 2023-12-19 DIAGNOSIS — R32 URINARY INCONTINENCE, UNSPECIFIED TYPE: ICD-10-CM

## 2023-12-19 DIAGNOSIS — E55.9 VITAMIN D DEFICIENCY: ICD-10-CM

## 2023-12-19 DIAGNOSIS — N17.9 ACUTE RENAL FAILURE, UNSPECIFIED ACUTE RENAL FAILURE TYPE: ICD-10-CM

## 2023-12-19 DIAGNOSIS — Z87.440 HISTORY OF UTI: ICD-10-CM

## 2023-12-19 DIAGNOSIS — E53.8 VITAMIN B12 DEFICIENCY: ICD-10-CM

## 2023-12-19 DIAGNOSIS — R71.8 ELEVATED MCV: ICD-10-CM

## 2023-12-19 DIAGNOSIS — R79.9 ABNORMAL BLOOD CHEMISTRY: ICD-10-CM

## 2023-12-19 LAB
BILIRUB BLD-MCNC: ABNORMAL MG/DL
CLARITY, POC: CLEAR
COLOR UR: YELLOW
EXPIRATION DATE: ABNORMAL
GLUCOSE UR STRIP-MCNC: NEGATIVE MG/DL
KETONES UR QL: ABNORMAL
LEUKOCYTE EST, POC: ABNORMAL
Lab: ABNORMAL
NITRITE UR-MCNC: NEGATIVE MG/ML
PH UR: 5.5 [PH] (ref 5–8)
PROT UR STRIP-MCNC: ABNORMAL MG/DL
RBC # UR STRIP: NEGATIVE /UL
SP GR UR: 1.03 (ref 1–1.03)
UROBILINOGEN UR QL: NORMAL

## 2023-12-19 RX ORDER — CIPROFLOXACIN 500 MG/1
500 TABLET, FILM COATED ORAL EVERY 12 HOURS SCHEDULED
Qty: 14 TABLET | Refills: 0 | Status: SHIPPED | OUTPATIENT
Start: 2023-12-19 | End: 2023-12-26

## 2023-12-19 NOTE — PROGRESS NOTES
"Chief Complaint  Back Pain (Pt states he had been on 2 rounds of antibiotics for UTI, thinks back pain may be related, may not have went away but is unsure)    Subjective        Bryan Roman presents to [facility]  History of Present Illness  S/p surgery for prostate. Having incontinence. getting little sleep has to change pads 5x a night. Also having back pain, could it due to hip surgery? He called Mansi's office and he's supposed to go in January for imaging. Low back right side/hip area. S/p Ciprofloxacin and Macrobid for urinary tract infection. Didn't seem to help. Doesn't feel he's having retention. \"I'm soaked.\" Called Dr. Rizvi's office and they said to expect incontinence x 13 weeks after procedure. He has burning sometimes.       Objective   Vital Signs:  /76 (BP Location: Left arm, Patient Position: Sitting, Cuff Size: Adult)   Pulse 50   Temp 97 °F (36.1 °C) (Temporal)   Resp 16   Ht 188 cm (74\")   Wt 118 kg (261 lb)   SpO2 97%   BMI 33.51 kg/m²   Estimated body mass index is 33.51 kg/m² as calculated from the following:    Height as of this encounter: 188 cm (74\").    Weight as of this encounter: 118 kg (261 lb).               Physical Exam  Vitals and nursing note reviewed.   Constitutional:       General: He is not in acute distress.     Appearance: Normal appearance. He is well-developed and well-groomed. He is not ill-appearing, toxic-appearing or diaphoretic.   HENT:      Head: Normocephalic and atraumatic.      Right Ear: Hearing normal.      Left Ear: Hearing normal.   Eyes:      General: Lids are normal. No scleral icterus.        Right eye: No discharge.         Left eye: No discharge.      Extraocular Movements: Extraocular movements intact.   Pulmonary:      Effort: Pulmonary effort is normal.   Musculoskeletal:      Cervical back: Neck supple.        Back:    Skin:     Coloration: Skin is not jaundiced or pale.   Neurological:      " General: No focal deficit present.      Mental Status: He is alert and oriented to person, place, and time.   Psychiatric:         Attention and Perception: Attention and perception normal.         Mood and Affect: Mood and affect normal.         Speech: Speech normal.         Behavior: Behavior normal. Behavior is cooperative.         Thought Content: Thought content normal.         Cognition and Memory: Cognition and memory normal.         Judgment: Judgment normal.        Result Review :  The following data was reviewed by: Génesis Vaca MD on 12/19/2023:  Lab Results   Component Value Date    HGBA1C 6.7 09/12/2023    HGBA1C 6.80 (H) 05/23/2023    HGBA1C 6.5 02/10/2023    A1C done 10/18/23 outside facility, 6.3.     Office Visit on 12/19/2023   Component Date Value Ref Range Status    Color 12/19/2023 Yellow  Yellow, Straw, Dark Yellow, Phyllis Final    Clarity, UA 12/19/2023 Clear  Clear Final    Specific Gravity  12/19/2023 1.030 (A)  1.005 - 1.030 Final    pH, Urine 12/19/2023 5.5  5.0 - 8.0 Final    Leukocytes 12/19/2023 Trace (A)  Negative Final    Nitrite, UA 12/19/2023 Negative  Negative Final    Protein, POC 12/19/2023 300 mg/dL (A)  Negative mg/dL Final    Glucose, UA 12/19/2023 Negative  Negative mg/dL Final    Ketones, UA 12/19/2023 Trace (A)  Negative Final    Urobilinogen, UA 12/19/2023 Normal  Normal, 0.2 E.U./dL Final    Bilirubin 12/19/2023 1 mg/dL (A)  Negative Final    Blood, UA 12/19/2023 Negative  Negative Final    Lot Number 12/19/2023 [id number]   Final    Expiration Date 12/19/2023 1/14/25   Final    Not enough urine for culture today. Last available result:    Urine Culture - Urine, Urine, Clean Catch (11/22/2023 17:54)     BASIC METABOLIC PANEL (10/25/2023 04:24)     Creatinine Clearance (Cockcroft-Gault Equation) from [facility]  on 12/19/2023  ** All calculations should be rechecked by clinician prior to use **    RESULT SUMMARY:  55 mL/min  Creatinine clearance, original  Cockcroft-Gault    45 mL/min  Creatinine clearance modified for overweight patient, using adjusted body weight of 97 kg (213 lbs).    37.9-44.5 mL/min  Note: This range uses IBW and adjusted body weight. Controversy exists over which form of weight to use.      INPUTS:  Sex --> 0 = Male  Age --> 78 years  Weight --> 261 lbs  Creatinine --> 1.9 mg/dL  Height --> 74 in                 Assessment and Plan   Diagnoses and all orders for this visit:    1. Urinary symptom or sign (Primary)  -     POC Urinalysis Dipstick, Automated  -     Urine Culture - , Urine, Clean Catch  -     ciprofloxacin (Cipro) 500 MG tablet; Take 1 tablet by mouth Every 12 (Twelve) Hours for 7 days.  Dispense: 14 tablet; Refill: 0    2. History of UTI  -     POC Urinalysis Dipstick, Automated  -     Urine Culture - , Urine, Clean Catch  -     ciprofloxacin (Cipro) 500 MG tablet; Take 1 tablet by mouth Every 12 (Twelve) Hours for 7 days.  Dispense: 14 tablet; Refill: 0    3. Urinary incontinence, unspecified type    4. History of prostate cancer  Comments:  s/p surgery with urology    5. Acute renal failure, unspecified acute renal failure type  -     Comprehensive Metabolic Panel  -     CBC (No Diff)  -     Uric Acid    6. Elevated MCV  -     CBC (No Diff)  -     Vitamin B12  -     Methylmalonic Acid, Serum    7. Vitamin D deficiency  -     Vitamin D,25-Hydroxy    8. Abnormal blood chemistry  -     Comprehensive Metabolic Panel  -     CBC (No Diff)  -     Uric Acid  -     Vitamin D,25-Hydroxy  -     Vitamin B12  -     Methylmalonic Acid, Serum    9. Vitamin B12 deficiency  -     Vitamin B12  -     Methylmalonic Acid, Serum    10. Idiopathic chronic gout without tophus, unspecified site  -     Comprehensive Metabolic Panel  -     Uric Acid    11. Right low back pain, unspecified chronicity, unspecified whether sciatica present  Comments:  follow up with orthopedics        Reviewed labs that were due, reordered. Not yet due for A1C as done in oct  at outside facility. Discussed abnormal renal function on last documented labs in October and suggested we follow up on that today.     Asking staff to forward this note, urine results, last urine culture to his urologist. Starting Ciprofloxacin based on last urine culture. Incontinence may be worsened by infection, follow up with urology.        Follow Up   Return in about 8 weeks (around 2/12/2024) for Medicare Wellness, Diabetes follow up.  Patient was given instructions and counseling regarding his condition or for health maintenance advice. Please see specific information pulled into the AVS if appropriate.

## 2023-12-20 LAB
25(OH)D3+25(OH)D2 SERPL-MCNC: 33.8 NG/ML (ref 30–100)
ALBUMIN SERPL-MCNC: 4.3 G/DL (ref 3.5–5.2)
ALBUMIN/GLOB SERPL: 1.7 G/DL
ALP SERPL-CCNC: 102 U/L (ref 39–117)
ALT SERPL-CCNC: 17 U/L (ref 1–41)
AST SERPL-CCNC: 14 U/L (ref 1–40)
BILIRUB SERPL-MCNC: 1.2 MG/DL (ref 0–1.2)
BUN SERPL-MCNC: 22 MG/DL (ref 8–23)
BUN/CREAT SERPL: 16.1 (ref 7–25)
CALCIUM SERPL-MCNC: 9.3 MG/DL (ref 8.6–10.5)
CHLORIDE SERPL-SCNC: 106 MMOL/L (ref 98–107)
CO2 SERPL-SCNC: 24.1 MMOL/L (ref 22–29)
CREAT SERPL-MCNC: 1.37 MG/DL (ref 0.76–1.27)
EGFRCR SERPLBLD CKD-EPI 2021: 53.1 ML/MIN/1.73
ERYTHROCYTE [DISTWIDTH] IN BLOOD BY AUTOMATED COUNT: 13.1 % (ref 12.3–15.4)
GLOBULIN SER CALC-MCNC: 2.5 GM/DL
GLUCOSE SERPL-MCNC: 92 MG/DL (ref 65–99)
HCT VFR BLD AUTO: 42.1 % (ref 37.5–51)
HGB BLD-MCNC: 14.3 G/DL (ref 13–17.7)
MCH RBC QN AUTO: 31.1 PG (ref 26.6–33)
MCHC RBC AUTO-ENTMCNC: 34 G/DL (ref 31.5–35.7)
MCV RBC AUTO: 91.5 FL (ref 79–97)
METHYLMALONATE SERPL-SCNC: NORMAL
PLATELET # BLD AUTO: 185 10*3/MM3 (ref 140–450)
POTASSIUM SERPL-SCNC: 4.4 MMOL/L (ref 3.5–5.2)
PROT SERPL-MCNC: 6.8 G/DL (ref 6–8.5)
RBC # BLD AUTO: 4.6 10*6/MM3 (ref 4.14–5.8)
SODIUM SERPL-SCNC: 140 MMOL/L (ref 136–145)
URATE SERPL-MCNC: 5.7 MG/DL (ref 3.4–7)
VIT B12 SERPL-MCNC: 522 PG/ML (ref 211–946)
WBC # BLD AUTO: 6.06 10*3/MM3 (ref 3.4–10.8)

## 2023-12-26 LAB
25(OH)D3+25(OH)D2 SERPL-MCNC: 33.8 NG/ML (ref 30–100)
ALBUMIN SERPL-MCNC: 4.3 G/DL (ref 3.5–5.2)
ALBUMIN/GLOB SERPL: 1.7 G/DL
ALP SERPL-CCNC: 102 U/L (ref 39–117)
ALT SERPL-CCNC: 17 U/L (ref 1–41)
AST SERPL-CCNC: 14 U/L (ref 1–40)
BILIRUB SERPL-MCNC: 1.2 MG/DL (ref 0–1.2)
BUN SERPL-MCNC: 22 MG/DL (ref 8–23)
BUN/CREAT SERPL: 16.1 (ref 7–25)
CALCIUM SERPL-MCNC: 9.3 MG/DL (ref 8.6–10.5)
CHLORIDE SERPL-SCNC: 106 MMOL/L (ref 98–107)
CO2 SERPL-SCNC: 24.1 MMOL/L (ref 22–29)
CREAT SERPL-MCNC: 1.37 MG/DL (ref 0.76–1.27)
EGFRCR SERPLBLD CKD-EPI 2021: 53.1 ML/MIN/1.73
ERYTHROCYTE [DISTWIDTH] IN BLOOD BY AUTOMATED COUNT: 13.1 % (ref 12.3–15.4)
GLOBULIN SER CALC-MCNC: 2.5 GM/DL
GLUCOSE SERPL-MCNC: 92 MG/DL (ref 65–99)
HCT VFR BLD AUTO: 42.1 % (ref 37.5–51)
HGB BLD-MCNC: 14.3 G/DL (ref 13–17.7)
MCH RBC QN AUTO: 31.1 PG (ref 26.6–33)
MCHC RBC AUTO-ENTMCNC: 34 G/DL (ref 31.5–35.7)
MCV RBC AUTO: 91.5 FL (ref 79–97)
METHYLMALONATE SERPL-SCNC: 234 NMOL/L (ref 0–378)
PLATELET # BLD AUTO: 185 10*3/MM3 (ref 140–450)
POTASSIUM SERPL-SCNC: 4.4 MMOL/L (ref 3.5–5.2)
PROT SERPL-MCNC: 6.8 G/DL (ref 6–8.5)
RBC # BLD AUTO: 4.6 10*6/MM3 (ref 4.14–5.8)
SODIUM SERPL-SCNC: 140 MMOL/L (ref 136–145)
URATE SERPL-MCNC: 5.7 MG/DL (ref 3.4–7)
VIT B12 SERPL-MCNC: 522 PG/ML (ref 211–946)
WBC # BLD AUTO: 6.06 10*3/MM3 (ref 3.4–10.8)

## 2024-01-22 DIAGNOSIS — I10 ESSENTIAL HYPERTENSION: ICD-10-CM

## 2024-01-22 DIAGNOSIS — E11.59 TYPE 2 DIABETES MELLITUS WITH OTHER CIRCULATORY COMPLICATION, WITHOUT LONG-TERM CURRENT USE OF INSULIN: ICD-10-CM

## 2024-01-22 RX ORDER — LOSARTAN POTASSIUM 100 MG/1
100 TABLET ORAL DAILY
Qty: 90 TABLET | Refills: 3 | Status: SHIPPED | OUTPATIENT
Start: 2024-01-22

## 2024-01-22 NOTE — TELEPHONE ENCOUNTER
Rx Refill Note  Requested Prescriptions     Pending Prescriptions Disp Refills    losartan (COZAAR) 100 MG tablet [Pharmacy Med Name: LOSARTAN 100 MG TABS* 100 Tablet] 90 tablet 3     Sig: TAKE 1 TABLET BY MOUTH DAILY. INDICATIONS: HIGH BLOOD PRESSURE DISORDER, DIABETES      Last office visit with prescribing clinician: 12/19/2023   Next office visit with prescribing clinician: 2/13/2024       Marsha Brown MA  01/22/24, 10:11 EST

## 2024-03-11 RX ORDER — AMLODIPINE BESYLATE 10 MG/1
10 TABLET ORAL DAILY
Qty: 90 TABLET | Refills: 3 | Status: SHIPPED | OUTPATIENT
Start: 2024-03-11

## 2024-05-24 ENCOUNTER — HOSPITAL ENCOUNTER (INPATIENT)
Facility: HOSPITAL | Age: 79
LOS: 3 days | Discharge: HOME | DRG: 948 | End: 2024-05-27
Attending: INTERNAL MEDICINE | Admitting: INTERNAL MEDICINE
Payer: MEDICARE

## 2024-05-24 PROBLEM — R53.81 DECLINING FUNCTIONAL STATUS: Status: ACTIVE | Noted: 2024-05-24

## 2024-05-24 LAB
GLUCOSE BLD-MCNC: 204 MG/DL (ref 74–106)
PERFORMED ON: ABNORMAL

## 2024-05-24 PROCEDURE — 6370000000 HC RX 637 (ALT 250 FOR IP): Performed by: PHYSICIAN ASSISTANT

## 2024-05-24 PROCEDURE — 1200000002 HC SEMI PRIVATE SWING BED

## 2024-05-24 RX ORDER — ACETAMINOPHEN 325 MG/1
650 TABLET ORAL ONCE
Status: DISCONTINUED | OUTPATIENT
Start: 2024-05-24 | End: 2024-05-27 | Stop reason: HOSPADM

## 2024-05-24 RX ORDER — INSULIN LISPRO 100 [IU]/ML
0-4 INJECTION, SOLUTION INTRAVENOUS; SUBCUTANEOUS NIGHTLY
Status: DISCONTINUED | OUTPATIENT
Start: 2024-05-24 | End: 2024-05-27 | Stop reason: HOSPADM

## 2024-05-24 RX ORDER — ERGOCALCIFEROL 1.25 MG/1
50000 CAPSULE ORAL WEEKLY
Status: ON HOLD | COMMUNITY
End: 2024-05-27 | Stop reason: HOSPADM

## 2024-05-24 RX ORDER — METHOCARBAMOL 500 MG/1
500 TABLET, FILM COATED ORAL 3 TIMES DAILY
Status: ON HOLD | COMMUNITY
End: 2024-05-27 | Stop reason: HOSPADM

## 2024-05-24 RX ORDER — GLIPIZIDE 5 MG/1
5 TABLET ORAL
Status: DISCONTINUED | OUTPATIENT
Start: 2024-05-24 | End: 2024-05-27 | Stop reason: HOSPADM

## 2024-05-24 RX ORDER — INSULIN LISPRO 100 [IU]/ML
0-4 INJECTION, SOLUTION INTRAVENOUS; SUBCUTANEOUS PRN
Status: DISCONTINUED | OUTPATIENT
Start: 2024-05-24 | End: 2024-05-27 | Stop reason: HOSPADM

## 2024-05-24 RX ORDER — HYDROCHLOROTHIAZIDE 25 MG/1
25 TABLET ORAL DAILY
Status: DISCONTINUED | OUTPATIENT
Start: 2024-05-24 | End: 2024-05-27 | Stop reason: HOSPADM

## 2024-05-24 RX ORDER — ATORVASTATIN CALCIUM 40 MG/1
40 TABLET, FILM COATED ORAL DAILY
Status: ON HOLD | COMMUNITY
End: 2024-05-27 | Stop reason: HOSPADM

## 2024-05-24 RX ORDER — ACETAMINOPHEN 325 MG/1
650 TABLET ORAL EVERY 4 HOURS PRN
Status: DISCONTINUED | OUTPATIENT
Start: 2024-05-24 | End: 2024-05-27 | Stop reason: HOSPADM

## 2024-05-24 RX ORDER — DEXTROSE MONOHYDRATE 100 MG/ML
INJECTION, SOLUTION INTRAVENOUS CONTINUOUS PRN
Status: DISCONTINUED | OUTPATIENT
Start: 2024-05-24 | End: 2024-05-27 | Stop reason: HOSPADM

## 2024-05-24 RX ORDER — VITAMIN B COMPLEX
1000 TABLET ORAL DAILY
Status: DISCONTINUED | OUTPATIENT
Start: 2024-05-24 | End: 2024-05-27 | Stop reason: HOSPADM

## 2024-05-24 RX ORDER — ATENOLOL 50 MG/1
100 TABLET ORAL 2 TIMES DAILY
Status: DISCONTINUED | OUTPATIENT
Start: 2024-05-24 | End: 2024-05-27 | Stop reason: HOSPADM

## 2024-05-24 RX ORDER — IBUPROFEN 600 MG/1
1 TABLET ORAL PRN
Status: DISCONTINUED | OUTPATIENT
Start: 2024-05-24 | End: 2024-05-27 | Stop reason: HOSPADM

## 2024-05-24 RX ORDER — POLYETHYLENE GLYCOL 3350 17 G/17G
17 POWDER, FOR SOLUTION ORAL DAILY PRN
Status: DISCONTINUED | OUTPATIENT
Start: 2024-05-24 | End: 2024-05-27 | Stop reason: HOSPADM

## 2024-05-24 RX ORDER — AMLODIPINE BESYLATE 5 MG/1
10 TABLET ORAL DAILY
Status: DISCONTINUED | OUTPATIENT
Start: 2024-05-24 | End: 2024-05-27 | Stop reason: HOSPADM

## 2024-05-24 RX ORDER — ENOXAPARIN SODIUM 100 MG/ML
40 INJECTION SUBCUTANEOUS DAILY
Status: DISCONTINUED | OUTPATIENT
Start: 2024-05-24 | End: 2024-05-24

## 2024-05-24 RX ORDER — LOSARTAN POTASSIUM 25 MG/1
25 TABLET ORAL DAILY
Status: DISCONTINUED | OUTPATIENT
Start: 2024-05-24 | End: 2024-05-27 | Stop reason: HOSPADM

## 2024-05-24 RX ADMIN — ACETAMINOPHEN 650 MG: 325 TABLET, FILM COATED ORAL at 16:56

## 2024-05-24 RX ADMIN — APIXABAN 5 MG: 5 TABLET, FILM COATED ORAL at 19:54

## 2024-05-24 RX ADMIN — ATENOLOL 100 MG: 50 TABLET ORAL at 19:53

## 2024-05-24 ASSESSMENT — LIFESTYLE VARIABLES: HOW OFTEN DO YOU HAVE A DRINK CONTAINING ALCOHOL: NEVER

## 2024-05-24 ASSESSMENT — PAIN DESCRIPTION - DESCRIPTORS: DESCRIPTORS: ACHING

## 2024-05-24 ASSESSMENT — PAIN SCALES - GENERAL: PAINLEVEL_OUTOF10: 4

## 2024-05-24 NOTE — ACP (ADVANCE CARE PLANNING)
Advance Care Planning     General Advance Care Planning (ACP) Conversation    Date of Conversation: 5/24/2024  Conducted with: Patient with Decision Making Capacity  Other persons present: Spouse Yelena Flores    Healthcare Decision Maker: Yelena Flores    Today we documented Decision Maker(s) consistent with Legal Next of Kin hierarchy.    Content/Action Overview:  Patient is ready to consider care preferences-refer to ACP Clinical Specialist  Reviewed DNR/DNI and patient elects DNR order - referred to ACP Clinical Specialist & placed order  artificial nutrition, ventilation preferences, and resuscitation preferences      Length of Voluntary ACP Conversation in minutes:  <16 minutes (Non-Billable)    Jair Velez

## 2024-05-25 PROBLEM — Z86.73 HISTORY OF TIA (TRANSIENT ISCHEMIC ATTACK): Status: ACTIVE | Noted: 2024-05-25

## 2024-05-25 PROBLEM — Z85.46 HISTORY OF PROSTATE CANCER: Status: ACTIVE | Noted: 2024-05-25

## 2024-05-25 PROBLEM — I71.9 DESCENDING AORTIC ANEURYSM (HCC): Status: ACTIVE | Noted: 2024-05-25

## 2024-05-25 PROBLEM — I48.91 ATRIAL FIBRILLATION (HCC): Status: ACTIVE | Noted: 2024-05-25

## 2024-05-25 PROBLEM — Z85.820 HISTORY OF MELANOMA: Status: ACTIVE | Noted: 2024-05-25

## 2024-05-25 LAB
ALBUMIN SERPL-MCNC: 3.6 G/DL (ref 3.4–4.8)
ALBUMIN/GLOB SERPL: 1.4 {RATIO} (ref 0.8–2)
ALP SERPL-CCNC: 106 U/L (ref 25–100)
ALT SERPL-CCNC: 15 U/L (ref 4–36)
ANION GAP SERPL CALCULATED.3IONS-SCNC: 7 MMOL/L (ref 3–16)
AST SERPL-CCNC: 13 U/L (ref 8–33)
BASOPHILS # BLD: 0 K/UL (ref 0–0.1)
BASOPHILS NFR BLD: 0.3 %
BILIRUB SERPL-MCNC: 1 MG/DL (ref 0.3–1.2)
BUN SERPL-MCNC: 24 MG/DL (ref 6–20)
CALCIUM SERPL-MCNC: 8.8 MG/DL (ref 8.5–10.5)
CHLORIDE SERPL-SCNC: 104 MMOL/L (ref 98–107)
CO2 SERPL-SCNC: 26 MMOL/L (ref 20–30)
CREAT SERPL-MCNC: 1.2 MG/DL (ref 0.4–1.2)
EOSINOPHIL # BLD: 0.2 K/UL (ref 0–0.4)
EOSINOPHIL NFR BLD: 3.3 %
ERYTHROCYTE [DISTWIDTH] IN BLOOD BY AUTOMATED COUNT: 14.1 % (ref 11–16)
FOLATE SERPL-MCNC: 6.47 NG/ML
GFR SERPLBLD CREATININE-BSD FMLA CKD-EPI: 62 ML/MIN/{1.73_M2}
GLOBULIN SER CALC-MCNC: 2.6 G/DL
GLUCOSE BLD-MCNC: 111 MG/DL (ref 74–106)
GLUCOSE BLD-MCNC: 112 MG/DL (ref 74–106)
GLUCOSE BLD-MCNC: 165 MG/DL (ref 74–106)
GLUCOSE BLD-MCNC: 71 MG/DL (ref 74–106)
GLUCOSE SERPL-MCNC: 142 MG/DL (ref 74–106)
HCT VFR BLD AUTO: 40.4 % (ref 40–54)
HGB BLD-MCNC: 13.4 G/DL (ref 13–18)
IMM GRANULOCYTES # BLD: 0 K/UL
IMM GRANULOCYTES NFR BLD: 0.5 % (ref 0–5)
LYMPHOCYTES # BLD: 1 K/UL (ref 1.5–4)
LYMPHOCYTES NFR BLD: 16.2 %
MCH RBC QN AUTO: 31.1 PG (ref 27–32)
MCHC RBC AUTO-ENTMCNC: 33.2 G/DL (ref 31–35)
MCV RBC AUTO: 93.7 FL (ref 80–100)
MONOCYTES # BLD: 0.6 K/UL (ref 0.2–0.8)
MONOCYTES NFR BLD: 10 %
NEUTROPHILS # BLD: 4.3 K/UL (ref 2–7.5)
NEUTS SEG NFR BLD: 69.7 %
PERFORMED ON: ABNORMAL
PLATELET # BLD AUTO: 163 K/UL (ref 150–400)
PMV BLD AUTO: 10.5 FL (ref 6–10)
POTASSIUM SERPL-SCNC: 3.8 MMOL/L (ref 3.4–5.1)
PROT SERPL-MCNC: 6.2 G/DL (ref 6.4–8.3)
RBC # BLD AUTO: 4.31 M/UL (ref 4.5–6)
SARS-COV-2 RDRP RESP QL NAA+PROBE: NOT DETECTED
SODIUM SERPL-SCNC: 137 MMOL/L (ref 136–145)
TSH SERPL-MCNC: 3.44 UIU/ML (ref 0.27–4.2)
VIT B12 SERPL-MCNC: 624 PG/ML (ref 211–911)
WBC # BLD AUTO: 6.1 K/UL (ref 4–11)

## 2024-05-25 PROCEDURE — 36415 COLL VENOUS BLD VENIPUNCTURE: CPT

## 2024-05-25 PROCEDURE — 85025 COMPLETE CBC W/AUTO DIFF WBC: CPT

## 2024-05-25 PROCEDURE — 99305 1ST NF CARE MODERATE MDM 35: CPT | Performed by: INTERNAL MEDICINE

## 2024-05-25 PROCEDURE — 80053 COMPREHEN METABOLIC PANEL: CPT

## 2024-05-25 PROCEDURE — 6370000000 HC RX 637 (ALT 250 FOR IP): Performed by: PHYSICIAN ASSISTANT

## 2024-05-25 PROCEDURE — 82607 VITAMIN B-12: CPT

## 2024-05-25 PROCEDURE — 87635 SARS-COV-2 COVID-19 AMP PRB: CPT

## 2024-05-25 PROCEDURE — 1200000002 HC SEMI PRIVATE SWING BED

## 2024-05-25 PROCEDURE — 82746 ASSAY OF FOLIC ACID SERUM: CPT

## 2024-05-25 PROCEDURE — 84443 ASSAY THYROID STIM HORMONE: CPT

## 2024-05-25 RX ORDER — PANTOPRAZOLE SODIUM 40 MG/1
40 TABLET, DELAYED RELEASE ORAL
Status: DISCONTINUED | OUTPATIENT
Start: 2024-05-26 | End: 2024-05-27 | Stop reason: HOSPADM

## 2024-05-25 RX ADMIN — POLYETHYLENE GLYCOL (3350) 17 G: 17 POWDER, FOR SOLUTION ORAL at 15:16

## 2024-05-25 RX ADMIN — ACETAMINOPHEN 650 MG: 325 TABLET, FILM COATED ORAL at 21:53

## 2024-05-25 RX ADMIN — APIXABAN 5 MG: 5 TABLET, FILM COATED ORAL at 20:23

## 2024-05-25 RX ADMIN — AMLODIPINE BESYLATE 10 MG: 5 TABLET ORAL at 08:25

## 2024-05-25 RX ADMIN — ATENOLOL 100 MG: 50 TABLET ORAL at 08:25

## 2024-05-25 RX ADMIN — LOSARTAN POTASSIUM 25 MG: 25 TABLET, FILM COATED ORAL at 08:25

## 2024-05-25 RX ADMIN — GLIPIZIDE 5 MG: 5 TABLET ORAL at 08:26

## 2024-05-25 RX ADMIN — ACETAMINOPHEN 650 MG: 325 TABLET, FILM COATED ORAL at 06:12

## 2024-05-25 RX ADMIN — ATENOLOL 100 MG: 50 TABLET ORAL at 20:23

## 2024-05-25 RX ADMIN — APIXABAN 5 MG: 5 TABLET, FILM COATED ORAL at 08:25

## 2024-05-25 RX ADMIN — Medication 1000 UNITS: at 08:26

## 2024-05-25 ASSESSMENT — PAIN DESCRIPTION - LOCATION: LOCATION: HEAD

## 2024-05-25 ASSESSMENT — PAIN SCALES - GENERAL
PAINLEVEL_OUTOF10: 3
PAINLEVEL_OUTOF10: 6

## 2024-05-25 ASSESSMENT — PAIN DESCRIPTION - DESCRIPTORS: DESCRIPTORS: ACHING

## 2024-05-25 NOTE — H&P
a hospital within 96 hours after admission to [facility]    Electronically signed by SLY Peacock on 5/25/2024 at 11:31 AM    After reviewing patient data and diagnostic testing the plan of care was established in conjunction with Dr. Taylor. Patient was seen and examined with him. Dr. Taylor formulated the assessment and plan.     Assessment and Plan     Patient was seen and examined with SLY Peacock.  Agree with note as above.  Assessment and plan was done by me as below.    Active Hospital Problems    Diagnosis Date Noted    History of TIA (transient ischemic attack) [Z86.73]  Continue current regimen and make sure blood pressure and lipid profile under good control.   05/25/2024    Descending aortic aneurysm (HCC) [I71.9]  Status post repair.  Reviewed CTA from [facility] which showed stable appearance of the thoracic aneurysm postrepair.  Continue current regimen.  Make sure blood pressure and lipid profile under good control.   05/25/2024    Atrial fibrillation (HCC) [I48.91]  Patient with a chronic A-fib.  Continue current regimen including anticoagulation.  Cardiac monitoring for now.  Monitor electrolytes and replace if needed.   05/25/2024    History of prostate cancer [Z85.46]  Prior history of prostate resection per patient report.  PSA was around 4 about a year ago.  I am going to recheck PSA with a.m. labs.   05/25/2024    History of melanoma [Z85.820]  Status post recent resection from the right shoulder area.  Reviewed MRI of the thoracic, lumbar spine as well as brain which showed no evidence of metastatic disease.    05/25/2024    Declining functional status [R53.81]  Not sure exactly about the reason behind the weakness in the lower extremities.  Extensive workup at [facility] was done and came back unremarkable.  I am going to proceed with checking CK level.  TSH, B12 and folic acid were checked and they were unremarkable.  Proceed with PT/OT.  Cardiac

## 2024-05-25 NOTE — PLAN OF CARE
Problem: Discharge Planning  Goal: Discharge to home or other facility with appropriate resources  Outcome: Progressing     Problem: Safety - Adult  Goal: Free from fall injury  5/24/2024 2201 by Cayla Prasad, RN  Outcome: Progressing  5/24/2024 1702 by Sarah Mccloud, RN  Outcome: Progressing     Problem: ABCDS Injury Assessment  Goal: Absence of physical injury  Outcome: Progressing     Problem: Skin/Tissue Integrity  Goal: Absence of new skin breakdown  Description: 1.  Monitor for areas of redness and/or skin breakdown  2.  Assess vascular access sites hourly  3.  Every 4-6 hours minimum:  Change oxygen saturation probe site  4.  Every 4-6 hours:  If on nasal continuous positive airway pressure, respiratory therapy assess nares and determine need for appliance change or resting period.  Outcome: Progressing

## 2024-05-26 LAB
ALBUMIN SERPL-MCNC: 3.5 G/DL (ref 3.4–4.8)
ALBUMIN/GLOB SERPL: 1.3 {RATIO} (ref 0.8–2)
ALP SERPL-CCNC: 105 U/L (ref 25–100)
ALT SERPL-CCNC: 16 U/L (ref 4–36)
ANION GAP SERPL CALCULATED.3IONS-SCNC: 9 MMOL/L (ref 3–16)
AST SERPL-CCNC: 14 U/L (ref 8–33)
BASOPHILS # BLD: 0 K/UL (ref 0–0.1)
BASOPHILS NFR BLD: 0.3 %
BILIRUB SERPL-MCNC: 1.1 MG/DL (ref 0.3–1.2)
BUN SERPL-MCNC: 21 MG/DL (ref 6–20)
CALCIUM SERPL-MCNC: 9.1 MG/DL (ref 8.5–10.5)
CHLORIDE SERPL-SCNC: 105 MMOL/L (ref 98–107)
CHOLEST SERPL-MCNC: 124 MG/DL (ref 0–200)
CK SERPL-CCNC: 29 U/L (ref 26–174)
CO2 SERPL-SCNC: 25 MMOL/L (ref 20–30)
CREAT SERPL-MCNC: 1.2 MG/DL (ref 0.4–1.2)
EOSINOPHIL # BLD: 0.2 K/UL (ref 0–0.4)
EOSINOPHIL NFR BLD: 3.4 %
ERYTHROCYTE [DISTWIDTH] IN BLOOD BY AUTOMATED COUNT: 14.1 % (ref 11–16)
GFR SERPLBLD CREATININE-BSD FMLA CKD-EPI: 62 ML/MIN/{1.73_M2}
GLOBULIN SER CALC-MCNC: 2.7 G/DL
GLUCOSE BLD-MCNC: 111 MG/DL (ref 74–106)
GLUCOSE BLD-MCNC: 115 MG/DL (ref 74–106)
GLUCOSE BLD-MCNC: 118 MG/DL (ref 74–106)
GLUCOSE BLD-MCNC: 159 MG/DL (ref 74–106)
GLUCOSE SERPL-MCNC: 122 MG/DL (ref 74–106)
HCT VFR BLD AUTO: 41.7 % (ref 40–54)
HDLC SERPL-MCNC: 28 MG/DL (ref 40–60)
HGB BLD-MCNC: 13.5 G/DL (ref 13–18)
IMM GRANULOCYTES # BLD: 0 K/UL
IMM GRANULOCYTES NFR BLD: 0.3 % (ref 0–5)
LDLC SERPL CALC-MCNC: 77 MG/DL
LYMPHOCYTES # BLD: 1 K/UL (ref 1.5–4)
LYMPHOCYTES NFR BLD: 16.3 %
MCH RBC QN AUTO: 30.3 PG (ref 27–32)
MCHC RBC AUTO-ENTMCNC: 32.4 G/DL (ref 31–35)
MCV RBC AUTO: 93.7 FL (ref 80–100)
MONOCYTES # BLD: 0.5 K/UL (ref 0.2–0.8)
MONOCYTES NFR BLD: 8.3 %
NEUTROPHILS # BLD: 4.6 K/UL (ref 2–7.5)
NEUTS SEG NFR BLD: 71.4 %
PERFORMED ON: ABNORMAL
PLATELET # BLD AUTO: 167 K/UL (ref 150–400)
PMV BLD AUTO: 9.8 FL (ref 6–10)
POTASSIUM SERPL-SCNC: 4.1 MMOL/L (ref 3.4–5.1)
PROT SERPL-MCNC: 6.2 G/DL (ref 6.4–8.3)
PSA SERPL DL<=0.01 NG/ML-MCNC: <0.01 NG/ML (ref 0–4)
RBC # BLD AUTO: 4.45 M/UL (ref 4.5–6)
SODIUM SERPL-SCNC: 139 MMOL/L (ref 136–145)
TRIGL SERPL-MCNC: 96 MG/DL (ref 0–249)
VLDLC SERPL CALC-MCNC: 19 MG/DL
WBC # BLD AUTO: 6.4 K/UL (ref 4–11)

## 2024-05-26 PROCEDURE — 84153 ASSAY OF PSA TOTAL: CPT

## 2024-05-26 PROCEDURE — 6370000000 HC RX 637 (ALT 250 FOR IP): Performed by: INTERNAL MEDICINE

## 2024-05-26 PROCEDURE — 82550 ASSAY OF CK (CPK): CPT

## 2024-05-26 PROCEDURE — 80061 LIPID PANEL: CPT

## 2024-05-26 PROCEDURE — 85025 COMPLETE CBC W/AUTO DIFF WBC: CPT

## 2024-05-26 PROCEDURE — 6370000000 HC RX 637 (ALT 250 FOR IP): Performed by: PHYSICIAN ASSISTANT

## 2024-05-26 PROCEDURE — 80053 COMPREHEN METABOLIC PANEL: CPT

## 2024-05-26 PROCEDURE — 36415 COLL VENOUS BLD VENIPUNCTURE: CPT

## 2024-05-26 PROCEDURE — 1200000002 HC SEMI PRIVATE SWING BED

## 2024-05-26 RX ORDER — POLYETHYLENE GLYCOL 3350 17 G/17G
17 POWDER, FOR SOLUTION ORAL ONCE
Status: COMPLETED | OUTPATIENT
Start: 2024-05-26 | End: 2024-05-26

## 2024-05-26 RX ADMIN — GLIPIZIDE 5 MG: 5 TABLET ORAL at 07:56

## 2024-05-26 RX ADMIN — ATENOLOL 100 MG: 50 TABLET ORAL at 20:43

## 2024-05-26 RX ADMIN — POLYETHYLENE GLYCOL 3350 17 G: 17 POWDER, FOR SOLUTION ORAL at 12:27

## 2024-05-26 RX ADMIN — Medication 1000 UNITS: at 07:56

## 2024-05-26 RX ADMIN — PANTOPRAZOLE SODIUM 40 MG: 40 TABLET, DELAYED RELEASE ORAL at 06:18

## 2024-05-26 RX ADMIN — APIXABAN 5 MG: 5 TABLET, FILM COATED ORAL at 20:43

## 2024-05-26 RX ADMIN — AMLODIPINE BESYLATE 10 MG: 5 TABLET ORAL at 07:55

## 2024-05-26 RX ADMIN — POLYETHYLENE GLYCOL (3350) 17 G: 17 POWDER, FOR SOLUTION ORAL at 08:00

## 2024-05-26 RX ADMIN — ACETAMINOPHEN 650 MG: 325 TABLET, FILM COATED ORAL at 18:22

## 2024-05-26 RX ADMIN — ATENOLOL 100 MG: 50 TABLET ORAL at 07:55

## 2024-05-26 RX ADMIN — POLYETHYLENE GLYCOL (3350) 17 G: 17 POWDER, FOR SOLUTION ORAL at 18:46

## 2024-05-26 RX ADMIN — APIXABAN 5 MG: 5 TABLET, FILM COATED ORAL at 07:56

## 2024-05-26 RX ADMIN — LOSARTAN POTASSIUM 25 MG: 25 TABLET, FILM COATED ORAL at 07:56

## 2024-05-26 ASSESSMENT — PAIN SCALES - GENERAL: PAINLEVEL_OUTOF10: 6

## 2024-05-26 ASSESSMENT — PAIN DESCRIPTION - LOCATION: LOCATION: HEAD

## 2024-05-26 NOTE — PLAN OF CARE
Problem: Discharge Planning  Goal: Discharge to home or other facility with appropriate resources  5/25/2024 2043 by Keara Palmer RN  Outcome: Progressing  Flowsheets (Taken 5/25/2024 2038)  Discharge to home or other facility with appropriate resources:   Identify barriers to discharge with patient and caregiver   Refer to discharge planning if patient needs post-hospital services based on physician order or complex needs related to functional status, cognitive ability or social support system  5/25/2024 1737 by Emy Celaya RN  Outcome: Progressing     Problem: Safety - Adult  Goal: Free from fall injury  5/25/2024 2043 by Keara Palmer RN  Outcome: Progressing  5/25/2024 1737 by Eym Celaya RN  Outcome: Progressing     Problem: ABCDS Injury Assessment  Goal: Absence of physical injury  5/25/2024 2043 by Keara Palmer RN  Outcome: Progressing  5/25/2024 1737 by Emy Celaya RN  Outcome: Progressing     Problem: Skin/Tissue Integrity  Goal: Absence of new skin breakdown  Description: 1.  Monitor for areas of redness and/or skin breakdown  2.  Assess vascular access sites hourly  3.  Every 4-6 hours minimum:  Change oxygen saturation probe site  4.  Every 4-6 hours:  If on nasal continuous positive airway pressure, respiratory therapy assess nares and determine need for appliance change or resting period.  5/25/2024 2043 by Keara Palmer RN  Outcome: Progressing  5/25/2024 1737 by Emy Celaya RN  Outcome: Progressing

## 2024-05-26 NOTE — PLAN OF CARE
Problem: Discharge Planning  Goal: Discharge to home or other facility with appropriate resources  5/26/2024 0907 by Padmaja Goodman LPN  Outcome: Progressing  Flowsheets (Taken 5/26/2024 0755)  Discharge to home or other facility with appropriate resources: Identify barriers to discharge with patient and caregiver  5/25/2024 2043 by Keara Palmer RN  Outcome: Progressing  Flowsheets (Taken 5/25/2024 2038)  Discharge to home or other facility with appropriate resources:   Identify barriers to discharge with patient and caregiver   Refer to discharge planning if patient needs post-hospital services based on physician order or complex needs related to functional status, cognitive ability or social support system     Problem: Safety - Adult  Goal: Free from fall injury  5/26/2024 0907 by Padmaja Goodman LPN  Outcome: Progressing  5/25/2024 2043 by Keara Palmer RN  Outcome: Progressing     Problem: ABCDS Injury Assessment  Goal: Absence of physical injury  5/26/2024 0907 by Padmaja Goodman LPN  Outcome: Progressing  5/25/2024 2043 by Keaar Palmer RN  Outcome: Progressing     Problem: Skin/Tissue Integrity  Goal: Absence of new skin breakdown  Description: 1.  Monitor for areas of redness and/or skin breakdown  2.  Assess vascular access sites hourly  3.  Every 4-6 hours minimum:  Change oxygen saturation probe site  4.  Every 4-6 hours:  If on nasal continuous positive airway pressure, respiratory therapy assess nares and determine need for appliance change or resting period.  5/26/2024 0907 by Padmaja Goodman LPN  Outcome: Progressing  5/25/2024 2043 by Keara Palmer RN  Outcome: Progressing

## 2024-05-27 ENCOUNTER — READMISSION MANAGEMENT (OUTPATIENT)
Dept: CALL CENTER | Facility: HOSPITAL | Age: 79
End: 2024-05-27
Payer: MEDICARE

## 2024-05-27 VITALS
DIASTOLIC BLOOD PRESSURE: 78 MMHG | HEART RATE: 67 BPM | TEMPERATURE: 97.8 F | SYSTOLIC BLOOD PRESSURE: 135 MMHG | BODY MASS INDEX: 32.73 KG/M2 | OXYGEN SATURATION: 96 % | RESPIRATION RATE: 16 BRPM | WEIGHT: 244.7 LBS

## 2024-05-27 LAB
GLUCOSE BLD-MCNC: 118 MG/DL (ref 74–106)
GLUCOSE BLD-MCNC: 152 MG/DL (ref 74–106)
PERFORMED ON: ABNORMAL
PERFORMED ON: ABNORMAL

## 2024-05-27 PROCEDURE — 97535 SELF CARE MNGMENT TRAINING: CPT | Performed by: OCCUPATIONAL THERAPIST

## 2024-05-27 PROCEDURE — 99316 NF DSCHRG MGMT 30 MIN+: CPT | Performed by: PHYSICIAN ASSISTANT

## 2024-05-27 PROCEDURE — 6370000000 HC RX 637 (ALT 250 FOR IP): Performed by: INTERNAL MEDICINE

## 2024-05-27 PROCEDURE — 6370000000 HC RX 637 (ALT 250 FOR IP): Performed by: PHYSICIAN ASSISTANT

## 2024-05-27 PROCEDURE — 97165 OT EVAL LOW COMPLEX 30 MIN: CPT | Performed by: OCCUPATIONAL THERAPIST

## 2024-05-27 RX ORDER — PANTOPRAZOLE SODIUM 40 MG/1
40 TABLET, DELAYED RELEASE ORAL
Qty: 30 TABLET | Refills: 3 | Status: SHIPPED | OUTPATIENT
Start: 2024-05-28

## 2024-05-27 RX ORDER — ATENOLOL 100 MG/1
100 TABLET ORAL 2 TIMES DAILY
Qty: 30 TABLET | Refills: 3 | Status: SHIPPED | OUTPATIENT
Start: 2024-05-27

## 2024-05-27 RX ADMIN — PANTOPRAZOLE SODIUM 40 MG: 40 TABLET, DELAYED RELEASE ORAL at 05:59

## 2024-05-27 RX ADMIN — Medication 1000 UNITS: at 08:19

## 2024-05-27 RX ADMIN — ATENOLOL 100 MG: 50 TABLET ORAL at 08:18

## 2024-05-27 RX ADMIN — APIXABAN 5 MG: 5 TABLET, FILM COATED ORAL at 08:19

## 2024-05-27 RX ADMIN — LOSARTAN POTASSIUM 25 MG: 25 TABLET, FILM COATED ORAL at 08:19

## 2024-05-27 RX ADMIN — AMLODIPINE BESYLATE 10 MG: 5 TABLET ORAL at 08:19

## 2024-05-27 RX ADMIN — GLIPIZIDE 5 MG: 5 TABLET ORAL at 08:19

## 2024-05-27 NOTE — DISCHARGE SUMMARY
and thoracic spines obtained at [facility] with no acute findings.  Patient was then transferred to this facility for ongoing rehabilitation.  Unfortunately, patient arrived late on Friday evening after PT was gone for the day.  He did undergo PT assessment today and found to be near baseline.  Patient safe for discharge home and has been instructed to use a walker.  Unclear etiology for patient's sensation changes.      Disposition: home    Discharged Condition: Stable    Vital Signs  Temp: 97.8 °F (36.6 °C)  Pulse: 67  Respirations: 16  BP: 135/78  SpO2: 96 %  O2 Device: None (Room air)       Vital signs reviewed in electronic chart.    Physical exam  Constitutional:  Well developed, well nourished, no acute distress.  Eyes:  PERRL, conjunctiva normal, sclera without icterus.  HENT:  Atraumatic, external ears normal, nose normal, oropharynx moist, no pharyngeal exudates.   Neck- supple, no JVD, no lymphadenopathy.  Respiratory:  No respiratory distress, no wheezing, rales or rhonchi detected.   Cardiovascular:  Normal rate, normal rhythm, no murmurs, no gallops, no rubs.   GI:  Soft, nondistended, normal bowel sounds, nontender, no hepatosplenomegaly appreciated.  Musculoskeletal:  No edema, cyanosis or obvious acute deformity. Moving all extremities.  Integument:  Warm and dry. No rash.  Neurologic:  Alert & oriented x 3, no apparent focal deficits noted.   Psychiatric:  Speech and behavior appropriate.       Activity: activity as tolerated  Diet: diabetic diet  Follow Up: Primary Care Physician in 1 week    Labs: For convenience and continuity at follow-up the following most recent labs are provided:    CBC:   Lab Results   Component Value Date/Time    WBC 6.4 05/26/2024 04:24 AM    HGB 13.5 05/26/2024 04:24 AM    HCT 41.7 05/26/2024 04:24 AM     05/26/2024 04:24 AM       RENAL:   Lab Results   Component Value Date/Time     05/26/2024 04:24 AM    K 4.1 05/26/2024 04:24 AM

## 2024-05-27 NOTE — FLOWSHEET NOTE
05/26/24 0755   Assessment   Charting Type Shift assessment   Psychosocial   Psychosocial (WDL) WDL   Neurological   Neuro (WDL) WDL   [place] Coma Scale   Eye Opening 4   Best Verbal Response 5   Best Motor Response 6   [place] Coma Scale Score 15   HEENT (Head, Ears, Eyes, Nose, & Throat)   HEENT (WDL) X   Voice Other (comment)  (paralyzed vocal cords)   Respiratory   Respiratory (WDL) X   Respiratory Interventions H.O.B. elevated   Breath Sounds   Right Upper Lobe Clear   Right Middle Lobe Clear   Right Lower Lobe Diminished   Left Upper Lobe Clear   Left Lower Lobe Diminished   Cardiac   Cardiac (WDL) X   Cardiac Regularity Irregular   Heart Sounds S1, S2   Cardiac Rhythm Atrial fib   Cardiac Monitor   Telemetry Box Number    Alarm Audible Yes   Alarms Set Yes   Telemetry Monitor Alarm Parameters    Cardiac/Telemetry Monitor On Portable telemetry pack applied   Gastrointestinal   Abdominal (WDL) WDL   Genitourinary   Genitourinary (WDL) X   Peripheral Vascular   Peripheral Vascular (WDL) X   Edema Right lower extremity;Left lower extremity   RLE Edema Trace   LLE Edema Trace   Skin Integumentary    Skin Integumentary (WDL) X   Skin Color Pink   Skin Condition/Temp Warm;Dry   Skin Integrity Lesion (comment)   Location rt shoulder   Care Plan - Skin/Tissue Integrity Goals   Skin Integrity Remains Intact Monitor for areas of redness and/or skin breakdown   Musculoskeletal   Musculoskeletal (WDL) X   RL Extremity Weakness;Unsteady   LL Extremity Weakness;Unsteady   Wound 05/24/24 Axilla Right lesion on the right shoulder moist reddened scab   Date First Assessed/Time First Assessed: 05/24/24 1550   Present on Original Admission: Yes  Primary Wound Type: Other (comment)  Location: Axilla  Wound Location Orientation: Right  Wound Description (Comments): lesion on the right shoulder moist red...   Wound Etiology Other   Dressing Status Clean;Dry;Intact   Care Plan - Discharge Planning Goals   Discharge 
   05/26/24 2030   Assessment   Charting Type Shift assessment   Psychosocial   Psychosocial (WDL) WDL   Neurological   Neuro (WDL) WDL   [place] Coma Scale   Eye Opening 4   Best Verbal Response 5   Best Motor Response 6   [place] Coma Scale Score 15   HEENT (Head, Ears, Eyes, Nose, & Throat)   HEENT (WDL) X   Voice Other (comment)  (paralyzed vocal cords)   Respiratory   Respiratory (WDL) X   Breath Sounds   Right Upper Lobe Clear   Right Middle Lobe Clear   Right Lower Lobe Diminished   Left Upper Lobe Clear   Left Lower Lobe Diminished   Cardiac   Cardiac (WDL) X   Cardiac Regularity Irregular   Heart Sounds S1, S2   Cardiac Rhythm Atrial fib   Cardiac Monitor   Telemetry Box Number    Alarm Audible Yes   Telemetry Monitor Alarm Parameters    Gastrointestinal   Abdominal (WDL) WDL   Genitourinary   Genitourinary (WDL) X   Peripheral Vascular   Peripheral Vascular (WDL) X   Edema Right lower extremity;Left lower extremity   RLE Edema Trace   LLE Edema Trace   Skin Integumentary    Skin Integumentary (WDL) X   Skin Color Pink   Skin Condition/Temp Dry;Warm   Skin Integrity Lesion (comment)   Location Right Shoulder   Musculoskeletal   Musculoskeletal (WDL) X   RL Extremity Weakness;Unsteady   LL Extremity Weakness;Unsteady   Wound 05/24/24 [place] Right lesion on the right shoulder moist reddened scab   Date First Assessed/Time First Assessed: 05/24/24 1550   Present on Original Admission: Yes  Primary Wound Type: Other (comment)  Location: Axilla  Wound Location Orientation: Right  Wound Description (Comments): lesion on the right shoulder moist red...   Wound Etiology Other   Dressing Status Clean;Dry;Intact   Dressing/Treatment Other (comment)  (Bandaid)       
lesion on the right shoulder moist red...   Wound Etiology Other   Dressing Status Clean;Dry;Intact   Care Plan - Discharge Planning Goals   Discharge to home or other facility with appropriate resources Identify barriers to discharge with patient and caregiver;Refer to discharge planning if patient needs post-hospital services based on physician order or complex needs related to functional status, cognitive ability or social support system

## 2024-05-27 NOTE — PROGRESS NOTES
4 Eyes Skin Assessment     NAME:  Lars Flores  YOB: 1945  MEDICAL RECORD NUMBER:  [id number]    The patient is being assessed for  Admission    I agree that at least one RN has performed a thorough Head to Toe Skin Assessment on the patient. ALL assessment sites listed below have been assessed.      Areas assessed by both nurses:    Head, Face, Ears, Shoulders, Back, Chest, Arms, Elbows, Hands, Sacrum. Buttock, Coccyx, Ischium, Legs. Feet and Heels, and Under Medical Devices         Does the Patient have a Wound? Yes wound(s) were present on assessment. LDA wound assessment was Initiated and completed by RN       [place] Prevention initiated by RN: Yes  Wound Care Orders initiated by RN: Yes    Pressure Injury (Stage 3,4, Unstageable, DTI, NWPT, and Complex wounds) if present, place Wound referral order by RN under : No    New Ostomies, if present place, Ostomy referral order under : No     Nurse 1 eSignature: Electronically signed by Sarah Mccloud RN on 5/24/24 at 5:07 PM EDT    **SHARE this note so that the co-signing nurse can place an eSignature**    Nurse 2 eSignature: Electronically signed by Sowmya Hidalgo RN on 5/24/24 at 6:11 PM EDT    
Miralax administered per MD PRN and Misc order.See MAR for details.  
Offered spiritual care to patient and family. Provided Advance Living Will packet. Told pt and family to reach out if they needed anything further.   
Pt discharged home.  Telemetry monitor removed.  Written and oral instructions given, pt stated understanding of instructions.  
Pt without BM after 2 administrations of miralax today. Pt given prune juice with butter att. Pt told to call out if he feels the urge to toilet.   
place;Oriented to time;Oriented to situation                  Education Given To: Patient  Education Provided: Fall Prevention Strategies;Equipment  Education Provided Comments: need for RW for all fx mobility  Education Method: Verbal;Demonstration  Barriers to Learning: None  Education Outcome: Verbalized understanding;Demonstrated understanding         Therapy Time   Individual Concurrent Group Co-treatment   Time In 0950         Time Out 1015         Minutes 25             This note serves as a DC summary in the event of pt discharge.      Narcisa Small, OT

## 2024-05-27 NOTE — OUTREACH NOTE
Prep Survey      Flowsheet Row Responses   [facility] facility patient discharged from? Non-[facility]   Is LACE score < 7 ? Non-BH Discharge   Eligibility [facility]   Date of Admission 05/24/24   Date of Discharge 05/27/24   Discharge diagnosis weakness   Does the patient have one of the following disease processes/diagnoses(primary or secondary)? Other   Does the patient have Home health ordered? No   Is there a DME ordered? No   Prep survey completed? Yes            KARLY CARABALLO - Registered Nurse

## 2024-05-28 ENCOUNTER — TRANSITIONAL CARE MANAGEMENT TELEPHONE ENCOUNTER (OUTPATIENT)
Dept: CALL CENTER | Facility: HOSPITAL | Age: 79
End: 2024-05-28
Payer: MEDICARE

## 2024-05-28 NOTE — OUTREACH NOTE
Call Center TCM Note      Flowsheet Row Responses   [facility] patient discharged from? Non-   Does the patient have one of the following disease processes/diagnoses(primary or secondary)? Other   TCM attempt successful? Yes   Call start time 1158   Call end time 1202   Discharge diagnosis weakness   Meds reviewed with patient/caregiver? Yes   Is the patient having any side effects they believe may be caused by any medication additions or changes? No   Does the patient have all medications ordered at discharge? No   What is keeping the patient from filling the prescriptions? Transportation  [Pt believes someone should be picking up RX's today (5/28/24) for pt]   Nursing Interventions No intervention needed   Comments Hosp dc [facility] apt on 6/5/24 with PCP   Does the patient have an appointment with their PCP within 7-14 days of discharge? Yes   Has all DME been delivered? No   Did the patient receive a copy of their discharge instructions? Yes   Nursing interventions Reviewed instructions with patient   What is the patient's perception of their health status since discharge? Improving  [some weakness but improving per pt]   Is the patient/caregiver able to teach back signs and symptoms related to disease process for when to call PCP? Yes   Is the patient/caregiver able to teach back signs and symptoms related to disease process for when to call 911? Yes   Is the patient/caregiver able to teach back the hierarchy of who to call/visit for symptoms/problems? PCP, Specialist, Home health nurse, Urgent Care, ED, 911 Yes   If the patient is a current smoker, are they able to teach back resources for cessation? Not a smoker   TCM call completed? Yes   Call end time 1202            Phyllis Sultana RN    5/28/2024, 12:07 EDT

## 2024-05-28 NOTE — CARE COORDINATION
Left patient voice mail telling him of follow-up appointment with Pearl Joy on 6/4/2024 at 10:30 AM.

## 2024-06-19 ENCOUNTER — OFFICE VISIT (OUTPATIENT)
Dept: CARDIOLOGY | Facility: CLINIC | Age: 79
End: 2024-06-19
Payer: MEDICARE

## 2024-06-19 VITALS
RESPIRATION RATE: 18 BRPM | BODY MASS INDEX: 32.21 KG/M2 | HEIGHT: 74 IN | DIASTOLIC BLOOD PRESSURE: 72 MMHG | HEART RATE: 61 BPM | SYSTOLIC BLOOD PRESSURE: 146 MMHG | WEIGHT: 251 LBS | OXYGEN SATURATION: 97 %

## 2024-06-19 DIAGNOSIS — I71.10 RUPTURED ANEURYSM OF THORACIC AORTA, UNSPECIFIED PART: ICD-10-CM

## 2024-06-19 DIAGNOSIS — E78.5 HYPERLIPIDEMIA LDL GOAL <100: ICD-10-CM

## 2024-06-19 DIAGNOSIS — Z01.818 PREOPERATIVE CLEARANCE: ICD-10-CM

## 2024-06-19 DIAGNOSIS — I48.21 PERMANENT ATRIAL FIBRILLATION: Primary | ICD-10-CM

## 2024-06-19 DIAGNOSIS — I10 ESSENTIAL HYPERTENSION: ICD-10-CM

## 2024-06-19 DIAGNOSIS — E11.9 CONTROLLED TYPE 2 DIABETES MELLITUS WITHOUT COMPLICATION, WITHOUT LONG-TERM CURRENT USE OF INSULIN: ICD-10-CM

## 2024-06-19 PROBLEM — Z79.4 TYPE 2 DIABETES MELLITUS WITH CIRCULATORY DISORDER, WITH LONG-TERM CURRENT USE OF INSULIN: Status: RESOLVED | Noted: 2020-07-13 | Resolved: 2024-06-19

## 2024-06-19 PROBLEM — E11.59 TYPE 2 DIABETES MELLITUS WITH CIRCULATORY DISORDER, WITH LONG-TERM CURRENT USE OF INSULIN: Status: RESOLVED | Noted: 2020-07-13 | Resolved: 2024-06-19

## 2024-06-19 NOTE — ASSESSMENT & PLAN NOTE
-Asymptomatic without palpitations  -Continue Atenolol for ventricular rate control strategy  -Given elevated [id number] score, continue Eliquis for CVA prophylaxis  -Follow-up in 1 year, sooner if needed

## 2024-06-19 NOTE — PROGRESS NOTES
[facility] Cardiology    Office Consult     Bryan Roman  [id number]  2024    Referred By: No ref. provider found    Chief Complaint   Patient presents with    Atrial Fibrillation    Surgical Clearance     Melanoma on right shoulder completed by [facility]        Subjective     History of Present Illness:   Bryan Roman is a 79 y.o. male who presents to the Cardiology Clinic for routine follow up and surgical clearance for melanoma excision.   The patient has a past medical history significant for type 2 diabetes mellitus, hypercholesterolemia, GERD, gout, and obesity.  Patient has a past vascular history significant for ascending aortic aneurysm status post repair in 9/15.  He subsequently had rupture of the remaining thoracic aortic aneurysm in , and underwent emergent operative repair at [facility].  He recently has had a prostatectomy and has had a recurrence of his melanoma.  He reports that his PET and MRI were both normal that he had performed yesterday.  He denies any chest pain, shortness of breath, palpitations, or significant lower extremity edema.  He is active at baseline caring for his wife and performing all of his ADLs.  He uses a rollator as needed.  He is continued on Eliquis for chronic anticoagulation without any difficulty.    Past Cardiac Testin. Last Coronary Angio: None  2. Prior Stress Testing: GXT 2015              1.  No evidence of inducible ischemia  3. Last Echo: 2023  1.  Normal left ventricular size and systolic function, LVEF 55-60%. Mild to moderate concentric LVH.  Normal right ventricular size and systolic function.  Severely increased left atrial index. Mild aortic regurgitation. Mild tricuspid regurgitation, RVSP 44 mmHg      4. Prior Holter Monitor: None    Review of Systems   Constitutional:  Negative for activity change and fatigue.   Respiratory:  Negative for chest tightness and shortness of breath.     Cardiovascular:  Negative for chest pain, palpitations and leg swelling.   Neurological:  Negative for dizziness.   All other systems reviewed and are negative.       Past Medical History:   Diagnosis Date    Aneurysm     aortic aneyrtysm ascending and transverse still has desending  watched by dr holloway at     Arthritis     Diabetes mellitus     Elevated cholesterol     GERD (gastroesophageal reflux disease)     Gout     Heart disease     Hyperlipidemia     Hypertension     Temporal arteritis     TIA (transient ischemic attack)     3 years ago    Vocal cord paralysis     left during open heart surgery       Past Surgical History:   Procedure Laterality Date    APPENDECTOMY      ARTERIAL ANEURYSM REPAIR      CHOLECYSTECTOMY      HERNIA REPAIR  1987    HIP SURGERY Right 2021    total at [facility]    KNEE SURGERY Right     ORIF WRIST FRACTURE Left 2018    Procedure: OPEN REDUCTION INTERNAL FIXATION LEFT WRIST;  Surgeon: Narinder Lewis MD;  Location: [facility] OR;  Service: Orthopedics    THROAT SURGERY  2015       Family History   Problem Relation Age of Onset    Diabetes Maternal Grandmother     Hypertension Other        Social History     Socioeconomic History    Marital status:    Tobacco Use    Smoking status: Former     Current packs/day: 0.00     Average packs/day: 0.5 packs/day for 1 year (0.5 ttl pk-yrs)     Types: Cigarettes     Start date: 1966     Quit date: 1967     Years since quittin.4     Passive exposure: Never    Smokeless tobacco: Never   Vaping Use    Vaping status: Never Used   Substance and Sexual Activity    Alcohol use: No    Drug use: No    Sexual activity: Defer         Current Outpatient Medications:     amLODIPine (NORVASC) 10 MG tablet, TAKE 1 TABLET BY MOUTH DAILY. FOR HIGH BLOOD PRESSURE., Disp: 90 tablet, Rfl: 3    apixaban (Eliquis) 5 MG tablet tablet, Take 1 tablet by mouth Every 12 (Twelve) Hours., Disp: 14 tablet, Rfl: 0    atenolol (TENORMIN) 100  "MG tablet, TAKE 2 TABLETS BY MOUTH DAILY., Disp: 180 tablet, Rfl: 3    Cholecalciferol (Vitamin D3) 1.25 MG (98978 UT) capsule, Take 1 capsule by mouth Every 7 (Seven) Days. Indications: Vitamin D Deficiency, Disp: 12 capsule, Rfl: 0    Cyanocobalamin 500 MCG sublingual tablet, Place 1 tablet under the tongue Daily. For low vitamin B12, Disp: 90 each, Rfl: 3    glipizide (GLUCOTROL XL) 5 MG ER tablet, TAKE 1 TABLET BY MOUTH DAILY., Disp: 90 tablet, Rfl: 3    losartan (COZAAR) 100 MG tablet, TAKE 1 TABLET BY MOUTH DAILY. INDICATIONS: HIGH BLOOD PRESSURE DISORDER, DIABETES, Disp: 90 tablet, Rfl: 3    Red Yeast Rice 600 MG capsule, Take  by mouth., Disp: , Rfl:       Allergies   Allergen Reactions    Ampicillin Rash    Lisinopril Unknown (See Comments)     Pt does not know--says wife knows      Niacin And Related Rash    Norco [Hydrocodone-Acetaminophen] Itching    Penicillins Rash       Objective     Vitals:    06/19/24 1023   BP: 146/72   BP Location: Right arm   Patient Position: Sitting   Cuff Size: Adult   Pulse: 61   Resp: 18   SpO2: 97%   Weight: 114 kg (251 lb)   Height: 188 cm (74\")     Body mass index is 32.23 kg/m².    Physical Exam  Vitals and nursing note reviewed.   Constitutional:       General: He is not in acute distress.     Appearance: Normal appearance. He is not toxic-appearing.   HENT:      Head: Normocephalic.      Mouth/Throat:      Mouth: Mucous membranes are moist.   Eyes:      Pupils: Pupils are equal, round, and reactive to light.   Cardiovascular:      Rate and Rhythm: Normal rate. Rhythm irregular.      Pulses: Normal pulses.      Heart sounds: Normal heart sounds. No murmur heard.  Pulmonary:      Effort: Pulmonary effort is normal.      Breath sounds: Normal breath sounds. No wheezing, rhonchi or rales.   Musculoskeletal:      Right lower leg: No edema.      Left lower leg: No edema.   Skin:     General: Skin is warm and dry.      Capillary Refill: Capillary refill takes less than 2 " seconds.   Neurological:      Mental Status: He is alert and oriented to person, place, and time. Mental status is at baseline.   Psychiatric:         Mood and Affect: Mood normal.         Behavior: Behavior normal.         Thought Content: Thought content normal.         Results Review:   I reviewed the patient's new clinical results.  I reviewed the patient's other test results and agree with the interpretation    Procedures    Assessment & Plan  Permanent atrial fibrillation  -Asymptomatic without palpitations  -Continue Atenolol for ventricular rate control strategy  -Given elevated [id number]-PCGd score, continue Eliquis for CVA prophylaxis  -Follow-up in 1 year, sooner if needed  Essential hypertension  -Blood pressure controlled today  -Continue current medication regimen    Hyperlipidemia LDL goal <100  -Lipid profile in 5/23 with , HDL 29, triglycerides 148  -History of statin intolerance  Ruptured aneurysm of thoracic aorta, unspecified part  -Underwent emergency repair 5/2022  Controlled type 2 diabetes mellitus without complication, without long-term current use of insulin  -A1c-6  -Managed by PCP    Preoperative clearance  -Seeking preoperative clearance for melanoma excision  -No chest pain or exertional anginal symptoms  -No evidence of decompensated CHF, valvulopathy  --The patient is currently at low risk for adverse perioperative cardiac events with a low risk revised cardiac risk index of 0.4%.  Given functional status is > 4 METS without anginal symptoms it is reasonable to proceed with the proposed procedure without further cardiac testing or interventions       Preventative Cardiology:   Tobacco Cessation: N/A   Advance Care Planning: ACP discussion was held with the patient during this visit. Patient does not have an advance directive, declines further assistance.     Follow Up:   Return in about 1 year (around 6/19/2025).      Thank you for allowing me to participate in the care of your  patient. Please to not hesitate to contact me with additional questions or concerns.     SARINA Valle

## 2024-06-21 RX ORDER — ATENOLOL 100 MG/1
100 TABLET ORAL 2 TIMES DAILY
Qty: 60 TABLET | Refills: 3 | OUTPATIENT
Start: 2024-06-21

## 2024-07-01 ENCOUNTER — TELEPHONE (OUTPATIENT)
Dept: INTERNAL MEDICINE | Facility: CLINIC | Age: 79
End: 2024-07-01
Payer: MEDICARE

## 2024-07-01 DIAGNOSIS — C43.9 MELANOMA OF SKIN: Primary | ICD-10-CM

## 2024-07-01 DIAGNOSIS — Z01.818 PRE-OP EXAMINATION: ICD-10-CM

## 2024-07-01 NOTE — TELEPHONE ENCOUNTER
"    Caller: Bryan Roman \"Don\"    Relationship to patient: Self    Best call back number:  [phone]     Chief complaint: HAS BEEN IN HOSPITAL SEVERAL TIMES IN PAST FEW TIMES IN MAY AND JUNE SO MISSED VISITS WITH PCP.    Type of visit: OFFICE    Requested date:  ASAP    If rescheduling, when is the original appointment: I TRIED TO SCHEDULE PATIENT FIRST AVAILABLE, ON 9/3/24 BUT HE SAID THAT WAS TO FAR AWAY.      Additional notes:   I TRIED TO SCHEDULE PATIENT FIRST AVAILABLE, ON 9/3/24 BUT HE SAID THAT WAS TO FAR AWAY.    PATIENT ASKED TO BE SCHEDULED ASAP.  PLEASE CALL HIM BACK.  THANK YOU          "

## 2024-07-02 ENCOUNTER — TELEPHONE (OUTPATIENT)
Dept: CARDIOLOGY | Facility: CLINIC | Age: 79
End: 2024-07-02

## 2024-07-02 NOTE — TELEPHONE ENCOUNTER
"    Caller: Bryan Roman \"Don\"    Relationship to patient: Self    Best call back number: [phone]    Chief complaint:     Type of visit:  STRESS TEST AND ELECTROCARDIOGRAM    Requested date: ASAP BEFORE 7-9-24     If rescheduling, when is the original appointment:      Additional notes:PATIENT IS SCHEDULED FOR SURGERY ON 7-10-24  AND IS NEEDING AN ELECTROCARDIOGRAM  AND A STRESS TEST BEFORE 7-9-24. PLEASE REACH OUT TO PATIENT.            "

## 2024-07-09 ENCOUNTER — OFFICE VISIT (OUTPATIENT)
Dept: INTERNAL MEDICINE | Facility: CLINIC | Age: 79
End: 2024-07-09
Payer: MEDICARE

## 2024-07-09 VITALS
OXYGEN SATURATION: 98 % | HEART RATE: 60 BPM | WEIGHT: 252.8 LBS | DIASTOLIC BLOOD PRESSURE: 70 MMHG | RESPIRATION RATE: 16 BRPM | SYSTOLIC BLOOD PRESSURE: 120 MMHG | BODY MASS INDEX: 32.44 KG/M2 | TEMPERATURE: 97.1 F | HEIGHT: 74 IN

## 2024-07-09 DIAGNOSIS — Z91.81 AT MODERATE RISK FOR FALL: ICD-10-CM

## 2024-07-09 DIAGNOSIS — E66.09 CLASS 1 OBESITY DUE TO EXCESS CALORIES WITH SERIOUS COMORBIDITY AND BODY MASS INDEX (BMI) OF 32.0 TO 32.9 IN ADULT: ICD-10-CM

## 2024-07-09 DIAGNOSIS — Z86.73 HISTORY OF CEREBELLAR STROKE: ICD-10-CM

## 2024-07-09 DIAGNOSIS — I71.23 ANEURYSM OF DESCENDING THORACIC AORTA WITHOUT RUPTURE: ICD-10-CM

## 2024-07-09 DIAGNOSIS — I48.21 PERMANENT ATRIAL FIBRILLATION: ICD-10-CM

## 2024-07-09 DIAGNOSIS — Z00.00 MEDICARE ANNUAL WELLNESS VISIT, SUBSEQUENT: Primary | ICD-10-CM

## 2024-07-09 DIAGNOSIS — I10 ESSENTIAL HYPERTENSION: ICD-10-CM

## 2024-07-09 DIAGNOSIS — Z28.21 PNEUMOCOCCAL VACCINATION DECLINED: ICD-10-CM

## 2024-07-09 DIAGNOSIS — C43.9 RECURRENT SKIN MELANOMA: ICD-10-CM

## 2024-07-09 DIAGNOSIS — E11.59 TYPE 2 DIABETES MELLITUS WITH OTHER CIRCULATORY COMPLICATION, WITHOUT LONG-TERM CURRENT USE OF INSULIN: ICD-10-CM

## 2024-07-09 PROBLEM — N18.31 STAGE 3A CHRONIC KIDNEY DISEASE: Status: RESOLVED | Noted: 2023-06-05 | Resolved: 2024-07-09

## 2024-07-09 PROBLEM — Z01.818 PREOPERATIVE CLEARANCE: Status: RESOLVED | Noted: 2018-06-25 | Resolved: 2024-07-09

## 2024-07-09 PROBLEM — Z90.79 S/P PROSTATECTOMY: Status: ACTIVE | Noted: 2023-10-24

## 2024-07-09 PROBLEM — I48.11 LONGSTANDING PERSISTENT ATRIAL FIBRILLATION: Chronic | Status: ACTIVE | Noted: 2023-10-18

## 2024-07-09 PROBLEM — Z85.46 HISTORY OF PROSTATE CANCER: Status: ACTIVE | Noted: 2024-05-25

## 2024-07-09 PROBLEM — Z85.820 HISTORY OF MELANOMA: Status: ACTIVE | Noted: 2024-05-25

## 2024-07-09 PROBLEM — C43.60 MALIGNANT MELANOMA OF UPPER EXTREMITY, INCLUDING SHOULDER: Status: ACTIVE | Noted: 2024-06-17

## 2024-07-09 LAB
EXPIRATION DATE: ABNORMAL
HBA1C MFR BLD: 5.9 % (ref 4.5–5.7)
Lab: ABNORMAL

## 2024-07-09 PROCEDURE — 1159F MED LIST DOCD IN RCRD: CPT | Performed by: FAMILY MEDICINE

## 2024-07-09 PROCEDURE — G0439 PPPS, SUBSEQ VISIT: HCPCS | Performed by: FAMILY MEDICINE

## 2024-07-09 PROCEDURE — 3074F SYST BP LT 130 MM HG: CPT | Performed by: FAMILY MEDICINE

## 2024-07-09 PROCEDURE — 1160F RVW MEDS BY RX/DR IN RCRD: CPT | Performed by: FAMILY MEDICINE

## 2024-07-09 PROCEDURE — 99214 OFFICE O/P EST MOD 30 MIN: CPT | Performed by: FAMILY MEDICINE

## 2024-07-09 PROCEDURE — 83036 HEMOGLOBIN GLYCOSYLATED A1C: CPT | Performed by: FAMILY MEDICINE

## 2024-07-09 PROCEDURE — 1126F AMNT PAIN NOTED NONE PRSNT: CPT | Performed by: FAMILY MEDICINE

## 2024-07-09 PROCEDURE — 3078F DIAST BP <80 MM HG: CPT | Performed by: FAMILY MEDICINE

## 2024-07-09 PROCEDURE — 96160 PT-FOCUSED HLTH RISK ASSMT: CPT | Performed by: FAMILY MEDICINE

## 2024-07-09 PROCEDURE — 99397 PER PM REEVAL EST PAT 65+ YR: CPT | Performed by: FAMILY MEDICINE

## 2024-07-09 PROCEDURE — 1170F FXNL STATUS ASSESSED: CPT | Performed by: FAMILY MEDICINE

## 2024-07-09 PROCEDURE — 3044F HG A1C LEVEL LT 7.0%: CPT | Performed by: FAMILY MEDICINE

## 2024-07-09 NOTE — PROGRESS NOTES
The ABCs of the Annual Wellness Visit  Subsequent Medicare Wellness Visit    Subjective    Bryan Roman is a 79 y.o. male who presents for a Subsequent Medicare Wellness Visit.    The following portions of the patient's history were reviewed and   updated as appropriate: allergies, current medications, past family history, past medical history, past social history, past surgical history, and problem list.    Compared to one year ago, the patient feels his physical   health is worse.    Compared to one year ago, the patient feels his mental   health is the same.    Recent Hospitalizations:  He was admitted within the past 365 days at [facility].       Current Medical Providers:  Patient Care Team:  Génesis Vaca MD as PCP - General (Family Medicine)  Imer Guidry MD as Consulting Physician (Cardiology)  Marcial Mosley MD (Vascular Surgery)  Christiana Hernandez OD as Consulting Physician (Optometry)  Bobby Nazario MD as Consulting Physician (Orthopedic Surgery)  Travis Rizvi Jr., MD as Consulting Physician (Urology)  Jelani Miller MD as Consulting Physician (General Surgery)  Vandana Barajas MD as Consulting Physician (Hematology and Oncology)    Outpatient Medications Prior to Visit   Medication Sig Dispense Refill    amLODIPine (NORVASC) 10 MG tablet TAKE 1 TABLET BY MOUTH DAILY. FOR HIGH BLOOD PRESSURE. 90 tablet 3    atenolol (TENORMIN) 100 MG tablet TAKE 2 TABLETS BY MOUTH DAILY. 180 tablet 3    Cholecalciferol (Vitamin D3) 1.25 MG (42369 UT) capsule Take 1 capsule by mouth Every 7 (Seven) Days. Indications: Vitamin D Deficiency 12 capsule 0    Cyanocobalamin 500 MCG sublingual tablet Place 1 tablet under the tongue Daily. For low vitamin B12 90 each 3    glipizide (GLUCOTROL XL) 5 MG ER tablet TAKE 1 TABLET BY MOUTH DAILY. 90 tablet 3    losartan (COZAAR) 100 MG tablet TAKE 1 TABLET BY MOUTH DAILY. INDICATIONS: HIGH BLOOD PRESSURE DISORDER, DIABETES 90 tablet 3     "Red Yeast Rice 600 MG capsule Take  by mouth.      apixaban (Eliquis) 5 MG tablet tablet Take 1 tablet by mouth Every 12 (Twelve) Hours. 14 tablet 0    apixaban (ELIQUIS) 5 MG tablet tablet Take 1 tablet by mouth 2 (Two) Times a Day.       No facility-administered medications prior to visit.       No opioid medication identified on active medication list. I have reviewed chart for other potential  high risk medication/s and harmful drug interactions in the elderly.        Aspirin is not on active medication list.  Aspirin use is not indicated based on review of current medical condition/s. Risk of harm outweighs potential benefits.  .    Patient Active Problem List   Diagnosis    Essential hypertension    Hyperlipidemia LDL goal <100    Controlled type 2 diabetes mellitus, without long-term current use of insulin    Ruptured aneurysm of thoracic aorta    GERD (gastroesophageal reflux disease)    Ascending aortic aneurysm    History of cerebellar stroke    Statin intolerance    Aneurysm    Aortic aneurysm    Gout    Nocturia    Sleep apnea    Wears eyeglasses    Permanent atrial fibrillation    Vitamin D deficiency    Vitamin B12 deficiency    History of melanoma    History of prostate cancer    Malignant melanoma of upper extremity, including shoulder    Recurrent skin melanoma    S/P prostatectomy    Aneurysm of descending thoracic aorta without rupture     Advance Care Planning   Advance Care Planning     Advance Directive is not on file.  ACP discussion was held with the patient during this visit. Patient has an advance directive (not in EMR), copy requested.     Objective    Vitals:    07/09/24 1354   BP: 120/70   BP Location: Left arm   Patient Position: Sitting   Cuff Size: Adult   Pulse: 60   Resp: 16   Temp: 97.1 °F (36.2 °C)   TempSrc: Temporal   SpO2: 98%   Weight: 115 kg (252 lb 12.8 oz)   Height: 188 cm (74\")   PainSc: 0-No pain     Estimated body mass index is 32.46 kg/m² as calculated from the " "following:    Height as of this encounter: 188 cm (74\").    Weight as of this encounter: 115 kg (252 lb 12.8 oz).    BMI is >= 30 and <35. (Class 1 Obesity). The following options were offered after discussion;: weight loss educational material (shared in after visit summary)      Does the patient have evidence of cognitive impairment? No    Lab Results   Component Value Date    HGBA1C 5.9 (A) 2024        HEALTH RISK ASSESSMENT    Smoking Status:  Social History     Tobacco Use   Smoking Status Former    Current packs/day: 0.00    Average packs/day: 0.5 packs/day for 1 year (0.5 ttl pk-yrs)    Types: Cigarettes    Start date: 1966    Quit date: 1967    Years since quittin.4    Passive exposure: Never   Smokeless Tobacco Never     Alcohol Consumption:  Social History     Substance and Sexual Activity   Alcohol Use No     Fall Risk Screen:    CAROLINE Fall Risk Assessment was completed, and patient is at MODERATE risk for falls. Assessment completed on:2024    Depression Screenin/9/2024     1:52 PM   PHQ-2/PHQ-9 Depression Screening   Little Interest or Pleasure in Doing Things 0-->not at all   Feeling Down, Depressed or Hopeless 0-->not at all   PHQ-9: Brief Depression Severity Measure Score 0       Health Habits and Functional and Cognitive Screenin/9/2024     1:51 PM   Functional & Cognitive Status   Do you have difficulty preparing food and eating? No   Do you have difficulty bathing yourself, getting dressed or grooming yourself? No   Do you have difficulty using the toilet? No   Do you have difficulty moving around from place to place? No   Do you have trouble with steps or getting out of a bed or a chair? No   Current Diet Well Balanced Diet   Dental Exam Up to date   Eye Exam Up to date   Exercise (times per week) 5 times per week   Current Exercises Include Other   Do you need help using the phone?  No   Are you deaf or do you have serious difficulty hearing?  No   Do " you need help to go to places out of walking distance? No   Do you need help shopping? No   Do you need help preparing meals?  No   Do you need help with housework?  No   Do you need help with laundry? No   Do you need help taking your medications? No   Do you need help managing money? No   Do you ever drive or ride in a car without wearing a seat belt? No   Have you felt unusual stress, anger or loneliness in the last month? No   Who do you live with? Spouse   If you need help, do you have trouble finding someone available to you? No   Have you been bothered in the last four weeks by sexual problems? No   Do you have difficulty concentrating, remembering or making decisions? No       Age-appropriate Screening Schedule:  Refer to the list below for future screening recommendations based on patient's age, sex and/or medical conditions. Orders for these recommended tests are listed in the plan section. The patient has been provided with a written plan.    Health Maintenance   Topic Date Due    TDAP/TD VACCINES (1 - Tdap) 07/09/2024 (Originally 4/11/1964)    ZOSTER VACCINE (1 of 2) 07/09/2024 (Originally 4/11/1996)    Pneumococcal Vaccine 65+ (2 of 2 - PCV) 07/10/2024 (Originally 11/5/2015)    URINE MICROALBUMIN  07/27/2024 (Originally 2/10/2024)    DIABETIC EYE EXAM  08/27/2024 (Originally 8/1/2021)    COVID-19 Vaccine (5 - 2023-24 season) 01/01/2025 (Originally 9/1/2023)    RSV Vaccine - Adults (1 - 1-dose 60+ series) 07/09/2025 (Originally 4/11/2006)    INFLUENZA VACCINE  08/01/2024    HEMOGLOBIN A1C  01/09/2025    LIPID PANEL  05/26/2025    ANNUAL WELLNESS VISIT  07/09/2025    BMI FOLLOWUP  07/09/2025    HEPATITIS C SCREENING  Completed    COLORECTAL CANCER SCREENING  Discontinued                  CMS Preventative Services Quick Reference  Risk Factors Identified During Encounter  Fall Risk-High or Moderate: Information on Fall Prevention Shared in After Visit Summary and Sit to Stand Exercise Information Shared in  "After Visit Summary  Immunizations Discussed/Encouraged: Tdap and Prevnar 20 (Pneumococcal 20-valent conjugate)  Polypharmacy: Medication List reviewed and Medications are appropriate for patient  The above risks/problems have been discussed with the patient.  Pertinent information has been shared with the patient in the After Visit Summary.  An After Visit Summary and PPPS were made available to the patient.    Follow Up:   Next Medicare Wellness visit to be scheduled in 1 year.       Additional E&M Note during same encounter follows:  Patient has multiple medical problems which are significant and separately identifiable that require additional work above and beyond the Medicare Wellness Visit.      Chief Complaint  Medicare Wellness-subsequent (AWV and preventive care ), Cancer (Has surgery tomorrow for recurrent melanoma right shoulder), and Diabetes    Subjective        Scheduled tomorrow for surgery  Recurrent melanoma right shoulder  Reports initial diagnosis of melanoma approx 9 years ago same site  Came back in same location  Has had many tests done at --many scans, labs.    Diabetes mellitus  No hypoglycemic symptoms    Aneurysms  Reports being followed by vascular surgery at         Bryan Roman is also being seen today for annual exam and follow up of chronic conditions.         Objective   Vital Signs:  /70 (BP Location: Left arm, Patient Position: Sitting, Cuff Size: Adult)   Pulse 60   Temp 97.1 °F (36.2 °C) (Temporal)   Resp 16   Ht 188 cm (74\")   Wt 115 kg (252 lb 12.8 oz)   SpO2 98%   BMI 32.46 kg/m²     Physical Exam  Vitals and nursing note reviewed.   Constitutional:       General: He is not in acute distress.     Appearance: Normal appearance. He is well-developed and well-groomed. He is obese. He is not ill-appearing, toxic-appearing or diaphoretic.   HENT:      Head: Normocephalic and atraumatic.      Right Ear: Hearing, tympanic membrane, ear canal and external ear " normal.      Left Ear: Hearing, tympanic membrane, ear canal and external ear normal.      Nose: Nose normal.      Mouth/Throat:      Mouth: Mucous membranes are moist.   Eyes:      General: Lids are normal. No scleral icterus.        Right eye: No discharge.         Left eye: No discharge.      Extraocular Movements: Extraocular movements intact.   Cardiovascular:      Rate and Rhythm: Bradycardia present. Rhythm irregular.   Pulmonary:      Effort: Pulmonary effort is normal.      Breath sounds: Normal breath sounds.   Musculoskeletal:      Cervical back: Neck supple.   Skin:     Coloration: Skin is not jaundiced or pale.   Neurological:      Mental Status: He is alert and oriented to person, place, and time.      Gait: Gait normal.   Psychiatric:         Attention and Perception: Attention and perception normal.         Mood and Affect: Mood and affect normal.         Speech: Speech normal.         Behavior: Behavior normal. Behavior is cooperative.         Thought Content: Thought content normal.         Cognition and Memory: Cognition and memory normal.         Judgment: Judgment normal.          The following data was reviewed by: Génesis Vaca MD on 07/09/2024:  Lab Results   Component Value Date    HGBA1C 5.9 (A) 07/09/2024    HGBA1C 6.7 09/12/2023    HGBA1C 6.80 (H) 05/23/2023        Data reviewed : Reviewed labs, imaging reports from .  PET/CT FDG Whole Body (06/17/2024 12:40) -- interval increase descending thoracic aortic aneurysmal sac, discussed with patient, encouraged follow up with vascular surgery at     MRI Brain With & Without Contrast (06/17/2024 09:01) --new punctate left cerebellar infarct, patient aware of finding.    MRI LUMBAR SPINE W WO CONTRAST (05/23/2024 17:53)     CT Abdomen Pelvis With Contrast (05/22/2024 15:50) --discussed indeterminate 1.5 cm heterogeneous lesion within inferior pole right kidney, nonemergent CT renal mass protocol suggested by radiology. Patient has had  PET since this result.    COMPREHENSIVE METABOLIC PANEL (05/26/2024 04:24)  eGFR 62  LIPID PANEL (05/26/2024 04:24)  124/28/77/96    Progress Notes by Imer Guidry MD (06/19/2024 10:30)            Assessment and Plan   Diagnoses and all orders for this visit:    1. Medicare annual wellness visit, subsequent (Primary)    2. Recurrent skin melanoma  Comments:  right shoulder, surgery at  tomorrow    3. Type 2 diabetes mellitus with other circulatory complication, without long-term current use of insulin  Comments:  associated with hypertension, afib, etc. Controlled, no hypoglycemia per patient. No med changes today.  Orders:  -     POC Glycosylated Hemoglobin (Hb A1C)    4. Aneurysm of descending thoracic aorta without rupture  Assessment & Plan:  Encouraged follow up with  vascular.      5. Permanent atrial fibrillation  Assessment & Plan:  Continue anticoagulation and beta blocker.  Follow up with cardiology.  Pt reports Dr. Guidry manages his Eliquis.      6. Essential hypertension  Assessment & Plan:  Hypertension is stable and controlled  Continue current treatment regimen.  Blood pressure will be reassessed in 3 months.      7. Class 1 obesity due to excess calories with serious comorbidity and body mass index (BMI) of 32.0 to 32.9 in adult  Comments:  weight trending downward over last 5 years per chart review    8. History of cerebellar stroke  Assessment & Plan:  On Eliquis per cardiology for Afib.  Statin-intolerant.   Keep blood pressure controlled.       9. Pneumococcal vaccination declined    10. At moderate risk for fall  Comments:  information via AVS           I spent 42 minutes caring for Bryan on this date of service. This time includes time spent by me in the following activities:preparing for the visit, reviewing tests, performing a medically appropriate examination and/or evaluation , counseling and educating the patient/family/caregiver, ordering medications, tests, or procedures, and  documenting information in the medical record    I spent 30 minutes on the separately reported service of [id number]. This time is not included in the time used to support the E/M service also reported today.     Follow Up   Return in about 4 months (around 10/28/2024) for Diabetes follow up, A1C in office.  Patient was given instructions and counseling regarding his condition or for health maintenance advice. Please see specific information pulled into the AVS if appropriate.

## 2024-07-09 NOTE — PATIENT INSTRUCTIONS
Medicare Wellness  Personal Prevention Plan of Service     Date of Office Visit:    Encounter Provider:  Génesis Vaca MD  Place of Service:  [facility]  Patient Name: Bryan Roman  :  1945    As part of the Medicare Wellness portion of your visit today, we are providing you with this personalized preventive plan of services (PPPS). This plan is based upon recommendations of the [place] Preventive Services Task Force (USPSTF) and the Advisory Committee on Immunization Practices (ACIP).    This lists the preventive care services that should be considered, and provides dates of when you are due. Items listed as completed are up-to-date and do not require any further intervention.    Health Maintenance   Topic Date Due    TDAP/TD VACCINES (1 - Tdap) Never done    Pneumococcal Vaccine 65+ (2 of 2 - PCV) 2015    ANNUAL WELLNESS VISIT  02/10/2024    ZOSTER VACCINE (1 of 2) 2024 (Originally 1996)    URINE MICROALBUMIN  2024 (Originally 2/10/2024)    DIABETIC EYE EXAM  2024 (Originally 2021)    COVID-19 Vaccine ( - -24 season) 2025 (Originally 2023)    RSV Vaccine - Adults (1 - 1-dose 60+ series) 2025 (Originally 2006)    INFLUENZA VACCINE  2024    HEMOGLOBIN A1C  2025    LIPID PANEL  2025    BMI FOLLOWUP  2025    HEPATITIS C SCREENING  Completed    COLORECTAL CANCER SCREENING  Discontinued       Orders Placed This Encounter   Procedures    POC Glycosylated Hemoglobin (Hb A1C)     Order Specific Question:   Release to patient     Answer:   Routine Release [[id number]]         Return in about 4 months (around 10/28/2024) for Diabetes follow up, A1C in office.        Health Maintenance After Age 65    After age 65, you are at a higher risk for certain long-term diseases and infections as well as injuries from falls. Falls are a major cause of broken bones and head injuries in people who  are older than age 65. Getting regular preventive care can help to keep you healthy and well. Preventive care includes getting regular testing and making lifestyle changes as recommended by your health care provider. Talk with your health care provider about:  Which screenings and tests you should have. A screening is a test that checks for a disease when you have no symptoms.  A diet and exercise plan that is right for you.    What should I know about screenings and tests to prevent falls?  Screening and testing are the best ways to find a health problem early. Early diagnosis and treatment give you the best chance of managing medical conditions that are common after age 65. Certain conditions and lifestyle choices may make you more likely to have a fall. Your health care provider may recommend:  Regular vision checks. Poor vision and conditions such as cataracts can make you more likely to have a fall. If you wear glasses, make sure to get your prescription updated if your vision changes.  Medicine review. Work with your health care provider to regularly review all of the medicines you are taking, including over-the-counter medicines. Ask your health care provider about any side effects that may make you more likely to have a fall. Tell your health care provider if any medicines that you take make you feel dizzy or sleepy.  Strength and balance checks. Your health care provider may recommend certain tests to check your strength and balance while standing, walking, or changing positions.  Foot health exam. Foot pain and numbness, as well as not wearing proper footwear, can make you more likely to have a fall.  Screenings, including:  Osteoporosis screening. Osteoporosis is a condition that causes the bones to get weaker and break more easily.  Blood pressure screening. Blood pressure changes and medicines to control blood pressure can make you feel dizzy.  Depression screening. You may be more likely to have a fall  if you have a fear of falling, feel depressed, or feel unable to do activities that you used to do.  Alcohol use screening. Using too much alcohol can affect your balance and may make you more likely to have a fall.    Follow these instructions at home:  Lifestyle  Do not drink alcohol if:  Your health care provider tells you not to drink.  If you drink alcohol:  Limit how much you have to:  0-1 drink a day for women.  0-2 drinks a day for men.  Know how much alcohol is in your drink. In the [place] one drink equals one 12 oz bottle of beer (355 mL), one 5 oz glass of wine (148 mL), or one 1½ oz glass of hard liquor (44 mL).  Do not use any products that contain nicotine or tobacco. These products include cigarettes, chewing tobacco, and vaping devices, such as e-cigarettes. If you need help quitting, ask your health care provider.    Activity    Follow a regular exercise program to stay fit. This will help you maintain your balance. Ask your health care provider what types of exercise are appropriate for you.  If you need a cane or walker, use it as recommended by your health care provider.  Wear supportive shoes that have nonskid soles.  Safety    Remove any tripping hazards, such as rugs, cords, and clutter.  Install safety equipment such as grab bars in bathrooms and safety rails on stairs.  Keep rooms and walkways well-lit.    General instructions  Talk with your health care provider about your risks for falling. Tell your health care provider if:  You fall. Be sure to tell your health care provider about all falls, even ones that seem minor.  You feel dizzy, tiredness (fatigue), or off-balance.  Take over-the-counter and prescription medicines only as told by your health care provider. These include supplements.  Eat a healthy diet and maintain a healthy weight. A healthy diet includes low-fat dairy products, low-fat (lean) meats, and fiber from whole grains, beans, and lots of fruits and vegetables.  Stay  current with your vaccines.  Schedule regular health, dental, and eye exams.    Summary  Having a healthy lifestyle and getting preventive care can help to protect your health and wellness after age 65.  Screening and testing are the best way to find a health problem early and help you avoid having a fall. Early diagnosis and treatment give you the best chance for managing medical conditions that are more common for people who are older than age 65.  Falls are a major cause of broken bones and head injuries in people who are older than age 65. Take precautions to prevent a fall at home.  Work with your health care provider to learn what changes you can make to improve your health and wellness and to prevent falls.    This information is not intended to replace advice given to you by your health care provider. Make sure you discuss any questions you have with your health care provider.  Document Revised: 05/09/2022 Document Reviewed: 05/09/2022  [employer] Patient Education © 2023 [employer]  Updated 2/29/24 tc   Fall Prevention in the Home, Adult  Falls can cause injuries and affect people of all ages. There are many simple things that you can do to make your home safe and to help prevent falls.  If you need it, ask for help making these changes.  What actions can I take to prevent falls?  General information  Use good lighting in all rooms. Make sure to:  Replace any light bulbs that burn out.  Turn on lights if it is dark and use night-lights.  Keep items that you use often in easy-to-reach places. Lower the shelves around your home if needed.  Move furniture so that there are clear paths around it.  Do not keep throw rugs or other things on the floor that can make you trip.  If any of your floors are uneven, fix them.  Add color or contrast paint or tape to clearly kristopher and help you see:  Grab bars or handrails.  First and last steps of staircases.  Where the edge of each step is.  If you use a ladder or  stepladder:  Make sure that it is fully opened. Do not climb a closed ladder.  Make sure the sides of the ladder are locked in place.  Have someone hold the ladder while you use it.  Know where your pets are as you move through your home.  What can I do in the bathroom?         Keep the floor dry. Clean up any water that is on the floor right away.  Remove soap buildup in the bathtub or shower. Buildup makes bathtubs and showers slippery.  Use non-skid mats or decals on the floor of the bathtub or shower.  Attach bath mats securely with double-sided, non-slip rug tape.  If you need to sit down while you are in the shower, use a non-slip stool.  Install grab bars by the toilet and in the bathtub and shower. Do not use towel bars as grab bars.  What can I do in the bedroom?  Make sure that you have a light by your bed that is easy to reach.  Do not use any sheets or blankets on your bed that hang to the floor.  Have a firm bench or chair with side arms that you can use for support when you get dressed.  What can I do in the kitchen?  Clean up any spills right away.  If you need to reach something above you, use a sturdy step stool that has a grab bar.  Keep electrical cables out of the way.  Do not use floor polish or wax that makes floors slippery.  What can I do with my stairs?  Do not leave anything on the stairs.  Make sure that you have a light switch at the top and the bottom of the stairs. Have them installed if you do not have them.  Make sure that there are handrails on both sides of the stairs. Fix handrails that are broken or loose. Make sure that handrails are as long as the staircases.  Install non-slip stair treads on all stairs in your home if they do not have carpet.  Avoid having throw rugs at the top or bottom of stairs, or secure the rugs with carpet tape to prevent them from moving.  Choose a carpet design that does not hide the edge of steps on the stairs. Make sure that carpet is firmly attached  to the stairs. Fix any carpet that is loose or worn.  What can I do on the outside of my home?  Use bright outdoor lighting.  Repair the edges of walkways and driveways and fix any cracks. Clear paths of anything that can make you trip, such as tools or rocks.  Add color or contrast paint or tape to clearly kristopher and help you see high doorway thresholds.  Trim any bushes or trees on the main path into your home.  Check that handrails are securely fastened and in good repair. Both sides of all steps should have handrails.  Install guardrails along the edges of any raised decks or porches.  Have leaves, snow, and ice cleared regularly. Use sand, salt, or ice melt on walkways during winter months if you live where there is ice and snow.  In the garage, clean up any spills right away, including grease or oil spills.  What other actions can I take?  Review your medicines with your health care provider. Some medicines can make you confused or feel dizzy. This can increase your chance of falling.  Wear closed-toe shoes that fit well and support your feet. Wear shoes that have rubber soles and low heels.  Use a cane, walker, scooter, or crutches that help you move around if needed.  Talk with your provider about other ways that you can decrease your risk of falls. This may include seeing a physical therapist to learn to do exercises to improve movement and strength.  Where to find more information  Centers for Disease Control and Prevention, [place]: cdc.gov  National [place] on Aging: mckayla.nih.gov  National [place] on Aging: mckayla.nih.gov  Contact a health care provider if:  You are afraid of falling at home.  You feel weak, drowsy, or dizzy at home.  You fall at home.  Get help right away if you:  Lose consciousness or have trouble moving after a fall.  Have a fall that causes a head injury.  These symptoms may be an emergency. Get help right away. Call 911.  Do not wait to see if the symptoms will go away.  Do not drive  yourself to the hospital.  This information is not intended to replace advice given to you by your health care provider. Make sure you discuss any questions you have with your health care provider.  Document Revised: 08/21/2023 Document Reviewed: 08/21/2023  Elsevier Patient Education © 2024 [employer]    Sit-to-Stand Exercise    The sit-to-stand exercise (also known as the chair stand or chair rise exercise) strengthens your lower body and helps you maintain or improve your mobility and independence. The end goal is to do the sit-to-stand exercise without using your hands. This will be easier as you become stronger. You should always talk with your health care provider before starting any exercise program, especially if you have had recent surgery.  Do the exercise exactly as told by your health care provider and adjust it as directed. It is normal to feel mild stretching, pulling, tightness, or discomfort as you do this exercise, but you should stop right away if you feel sudden pain or your pain gets worse. Do not begin doing this exercise until told by your health care provider.  What the sit-to-stand exercise does  The sit-to-stand exercise helps to strengthen the muscles in your thighs and the muscles in the center of your body that give you stability (core muscles). This exercise is especially helpful if:  You have had knee or hip surgery.  You have trouble getting up from a chair, out of a car, or off the toilet due to muscle weakness.  How to do the sit-to-stand exercise  Sit toward the front edge of a sturdy chair without armrests. Your knees should be bent and your feet should be flat on the floor and shoulder-width apart and underneath your hips.  Place your hands lightly on each side of the seat. Keep your back and neck as straight as possible, with your chest slightly forward.  Breathe in slowly. Lean forward and slightly shift your weight to the front of your feet.  Breathe out as you slowly stand  up. Try not to support any weight with your hands.  Stand and pause for a full breath in and out.  Breathe in as you sit down slowly. Tighten your core and abdominal muscles to control your lowering as much as possible. You should lower yourself back to the chair slowly, not just drop back into the seat.  Breathe out slowly.  Do this exercise 10-15 times. If needed, do it fewer times until you build up strength.  Rest for 1 minute, then do another set of 10-15 repetitions.  To change the difficulty of the sit-to-stand exercise  If the exercise is too difficult, use a chair with sturdy armrests, and push off the armrests to help you come to the standing position. You can also use the armrests to help slowly lower yourself back to sitting. As this gets easier, try to use your arms less. You can also place a firm cushion or pillow on the chair to make the surface higher.  If this exercise is too easy, do not use your arms to help raise or lower yourself. You can also wear a weighted vest, use hand weights, increase your repetitions, or try a lower chair.  General tips  You may feel tired when starting an exercise routine. This is normal.  You may have muscle soreness that lasts a few days. This is normal. As you get stronger, you may not feel muscle soreness.  Use smooth, steady movements.  Do not  hold your breath during strength exercises. This can cause unsafe changes in your blood pressure.  Breathe in slowly through your nose, and breathe out slowly through your mouth.  Summary  Strengthening your lower body is an important step to help you move safely and independently.  The sit-to-stand exercise helps strengthen the muscles in your thighs and core.  You should always talk with your health care provider before starting any exercise program, especially if you have had recent surgery.  This information is not intended to replace advice given to you by your health care provider. Make sure you discuss any questions  you have with your health care provider.  Document Revised: 04/10/2022 Document Reviewed: 04/10/2022  [employer] Patient Education © 2024 [employer]    Exercising to Stay Healthy  To become healthy and stay healthy, it is recommended that you do moderate-intensity and vigorous-intensity exercise. You can tell that you are exercising at a moderate intensity if your heart starts beating faster and you start breathing faster but can still hold a conversation. You can tell that you are exercising at a vigorous intensity if you are breathing much harder and faster and cannot hold a conversation while exercising.  How can exercise benefit me?  Exercising regularly is important. It has many health benefits, such as:  Improving overall fitness, flexibility, and endurance.  Increasing bone density.  Helping with weight control.  Decreasing body fat.  Increasing muscle strength and endurance.  Reducing stress and tension, anxiety, depression, or anger.  Improving overall health.  What guidelines should I follow while exercising?  Before you start a new exercise program, talk with your health care provider.  Do not exercise so much that you hurt yourself, feel dizzy, or get very short of breath.  Wear comfortable clothes and wear shoes with good support.  Drink plenty of water while you exercise to prevent dehydration or heat stroke.  Work out until your breathing and your heartbeat get faster (moderate intensity).  How often should I exercise?  Choose an activity that you enjoy, and set realistic goals. Your health care provider can help you make an activity plan that is individually designed and works best for you.  Exercise regularly as told by your health care provider. This may include:  Doing strength training two times a week, such as:  Lifting weights.  Using resistance bands.  Push-ups.  Sit-ups.  Yoga.  Doing a certain intensity of exercise for a given amount of time. Choose from these options:  A total of 150  minutes of moderate-intensity exercise every week.  A total of 75 minutes of vigorous-intensity exercise every week.  A mix of moderate-intensity and vigorous-intensity exercise every week.  Children, pregnant women, people who have not exercised regularly, people who are overweight, and older adults may need to talk with a health care provider about what activities are safe to perform. If you have a medical condition, be sure to talk with your health care provider before you start a new exercise program.  What are some exercise ideas?  Moderate-intensity exercise ideas include:  Walking 1 mile (1.6 km) in about 15 minutes.  Biking.  Hiking.  Golfing.  Dancing.  Water aerobics.  Vigorous-intensity exercise ideas include:  Walking 4.5 miles (7.2 km) or more in about 1 hour.  Jogging or running 5 miles (8 km) in about 1 hour.  Biking 10 miles (16.1 km) or more in about 1 hour.  Lap swimming.  Roller-skating or in-line skating.  Cross-country skiing.  Vigorous competitive sports, such as football, basketball, and soccer.  Jumping rope.  Aerobic dancing.  What are some everyday activities that can help me get exercise?  Yard work, such as:  Pushing a .  Raking and bagging leaves.  Washing your car.  Pushing a stroller.  Shoveling snow.  Gardening.  Washing windows or floors.  How can I be more active in my day-to-day activities?  Use stairs instead of an elevator.  Take a walk during your lunch break.  If you drive, park your car farther away from your work or school.  If you take public transportation, get off one stop early and walk the rest of the way.  Stand up or walk around during all of your indoor phone calls.  Get up, stretch, and walk around every 30 minutes throughout the day.  Enjoy exercise with a friend. Support to continue exercising will help you keep a regular routine of activity.  Where to find more information  You can find more information about exercising to stay healthy from:  [place]  Department of Health and Human Services: www.hhs.gov  Centers for Disease Control and Prevention (CDC): www.cdc.gov  Summary  Exercising regularly is important. It will improve your overall fitness, flexibility, and endurance.  Regular exercise will also improve your overall health. It can help you control your weight, reduce stress, and improve your bone density.  Do not exercise so much that you hurt yourself, feel dizzy, or get very short of breath.  Before you start a new exercise program, talk with your health care provider.  This information is not intended to replace advice given to you by your health care provider. Make sure you discuss any questions you have with your health care provider.  Document Revised: 04/15/2022 Document Reviewed: 04/15/2022  Elsevier Patient Education © 2024 [employer] Inc.

## 2024-07-09 NOTE — ASSESSMENT & PLAN NOTE
Continue anticoagulation and beta blocker.  Follow up with cardiology.  Pt reports Dr. Guidry manages his Eliquis.

## 2024-08-14 ENCOUNTER — TELEPHONE (OUTPATIENT)
Dept: INTERNAL MEDICINE | Facility: CLINIC | Age: 79
End: 2024-08-14
Payer: MEDICARE

## 2024-08-14 NOTE — TELEPHONE ENCOUNTER
Pt called wanting to get urine analysis done here. He wants the urine culture done in [place] for his urologist who is in [facility]. The urologist said to call his pcp to do the urine anaylasis here. We explained he would have to go through his urologist to see if he can do the test here in [place] or that he would have to be seen by the provider here to get the test ordered for here. Pt asked encounter to be sent back as urgent.  Pls advise

## 2024-08-15 NOTE — TELEPHONE ENCOUNTER
"Attempted to contact, no answer/mailbox is full    Relay     \"  No. Urology can have order sent to [facility] or [facility]. Management of results falls on ordering provider so there is no reason to have primary care as \"middle man\" on this.   \"                 "

## 2024-10-29 ENCOUNTER — OFFICE VISIT (OUTPATIENT)
Dept: INTERNAL MEDICINE | Facility: CLINIC | Age: 79
End: 2024-10-29
Payer: MEDICARE

## 2024-10-29 VITALS
OXYGEN SATURATION: 98 % | BODY MASS INDEX: 31.7 KG/M2 | WEIGHT: 247 LBS | DIASTOLIC BLOOD PRESSURE: 62 MMHG | HEART RATE: 82 BPM | HEIGHT: 74 IN | TEMPERATURE: 97 F | SYSTOLIC BLOOD PRESSURE: 120 MMHG

## 2024-10-29 DIAGNOSIS — I48.21 PERMANENT ATRIAL FIBRILLATION: ICD-10-CM

## 2024-10-29 DIAGNOSIS — E11.59 TYPE 2 DIABETES MELLITUS WITH OTHER CIRCULATORY COMPLICATION, WITHOUT LONG-TERM CURRENT USE OF INSULIN: Primary | ICD-10-CM

## 2024-10-29 DIAGNOSIS — Z23 INFLUENZA VACCINE NEEDED: ICD-10-CM

## 2024-10-29 DIAGNOSIS — C43.61 MALIGNANT MELANOMA OF RIGHT UPPER EXTREMITY INCLUDING SHOULDER: ICD-10-CM

## 2024-10-29 DIAGNOSIS — I10 ESSENTIAL HYPERTENSION: ICD-10-CM

## 2024-10-29 LAB
EXPIRATION DATE: ABNORMAL
HBA1C MFR BLD: 5.8 % (ref 4.5–5.7)
Lab: ABNORMAL

## 2024-10-29 PROCEDURE — 1160F RVW MEDS BY RX/DR IN RCRD: CPT | Performed by: FAMILY MEDICINE

## 2024-10-29 PROCEDURE — 1159F MED LIST DOCD IN RCRD: CPT | Performed by: FAMILY MEDICINE

## 2024-10-29 PROCEDURE — G0008 ADMIN INFLUENZA VIRUS VAC: HCPCS | Performed by: FAMILY MEDICINE

## 2024-10-29 PROCEDURE — 1126F AMNT PAIN NOTED NONE PRSNT: CPT | Performed by: FAMILY MEDICINE

## 2024-10-29 PROCEDURE — 3078F DIAST BP <80 MM HG: CPT | Performed by: FAMILY MEDICINE

## 2024-10-29 PROCEDURE — 3044F HG A1C LEVEL LT 7.0%: CPT | Performed by: FAMILY MEDICINE

## 2024-10-29 PROCEDURE — 3074F SYST BP LT 130 MM HG: CPT | Performed by: FAMILY MEDICINE

## 2024-10-29 PROCEDURE — 90662 IIV NO PRSV INCREASED AG IM: CPT | Performed by: FAMILY MEDICINE

## 2024-10-29 PROCEDURE — 99214 OFFICE O/P EST MOD 30 MIN: CPT | Performed by: FAMILY MEDICINE

## 2024-10-29 PROCEDURE — 83036 HEMOGLOBIN GLYCOSYLATED A1C: CPT | Performed by: FAMILY MEDICINE

## 2024-10-29 RX ORDER — ATENOLOL 100 MG/1
200 TABLET ORAL DAILY
Qty: 180 TABLET | Refills: 3 | Status: SHIPPED | OUTPATIENT
Start: 2024-10-29

## 2024-10-29 RX ORDER — SOLIFENACIN SUCCINATE 10 MG/1
10 TABLET, FILM COATED ORAL DAILY
COMMUNITY

## 2024-10-29 RX ORDER — LOSARTAN POTASSIUM 100 MG/1
100 TABLET ORAL DAILY
Qty: 90 TABLET | Refills: 3 | Status: SHIPPED | OUTPATIENT
Start: 2024-10-29

## 2024-10-29 RX ORDER — AMLODIPINE BESYLATE 10 MG/1
10 TABLET ORAL DAILY
Qty: 90 TABLET | Refills: 3 | Status: SHIPPED | OUTPATIENT
Start: 2024-10-29

## 2024-10-29 RX ORDER — GLIPIZIDE 5 MG/1
5 TABLET, FILM COATED, EXTENDED RELEASE ORAL DAILY
Qty: 90 TABLET | Refills: 3 | Status: SHIPPED | OUTPATIENT
Start: 2024-10-29

## 2024-10-29 NOTE — PROGRESS NOTES
"Chief Complaint  Diabetes    Subjective        Bryan Roman presents to [facility]  History of Present Illness  Melanoma: s/p excision, right shoulder. Continues to be followed regularly by . Has to do another PET next month.   Diabetes  He presents for his follow-up diabetic visit. He has type 2 diabetes mellitus. Diabetic complications include a CVA. Current diabetic treatment includes diet and oral agent (monotherapy). An ACE inhibitor/angiotensin II receptor blocker is being taken.   Additional comments: Current treatment: glipizide. No hypoglycemia.  Hypertension  This is a chronic problem. The current episode started more than 1 year ago. The problem has been stable since onset. The problem is controlled. Current antihypertension treatment includes calcium channel blockers, beta blockers and angiotensin blockers. The current treatment provides significant improvement. Hypertensive end-organ damage includes CVA.       Objective   Vital Signs:  /62   Pulse 82   Temp 97 °F (36.1 °C)   Ht 188 cm (74\")   Wt 112 kg (247 lb)   SpO2 98%   BMI 31.71 kg/m²   Estimated body mass index is 31.71 kg/m² as calculated from the following:    Height as of this encounter: 188 cm (74\").    Weight as of this encounter: 112 kg (247 lb).            Physical Exam  Vitals and nursing note reviewed.   Constitutional:       General: He is not in acute distress.     Appearance: Normal appearance. He is well-developed and well-groomed. He is obese. He is not ill-appearing, toxic-appearing or diaphoretic.   HENT:      Head: Normocephalic and atraumatic.      Right Ear: Hearing normal.      Left Ear: Hearing normal.   Eyes:      General: Lids are normal. No scleral icterus.        Right eye: No discharge.         Left eye: No discharge.      Extraocular Movements: Extraocular movements intact.   Cardiovascular:      Rate and Rhythm: Normal rate and regular rhythm.   Pulmonary:      Effort: " Pulmonary effort is normal.   Musculoskeletal:      Cervical back: Neck supple.   Skin:     Coloration: Skin is not jaundiced or pale.             Comments: Large surgical scar posterior right shoulder   Neurological:      General: No focal deficit present.      Mental Status: He is alert and oriented to person, place, and time.   Psychiatric:         Attention and Perception: Attention and perception normal.         Mood and Affect: Mood and affect normal.         Speech: Speech normal.         Behavior: Behavior normal. Behavior is cooperative.         Thought Content: Thought content normal.         Cognition and Memory: Cognition and memory normal.         Judgment: Judgment normal.        Result Review :  The following data was reviewed by: Génesis Vaca MD on 10/29/2024:  Lab Results   Component Value Date    HGBA1C 5.8 (A) 10/29/2024    HGBA1C 5.9 (A) 07/09/2024    HGBA1C 6.7 09/12/2023      PROGRESS NOTES - [facility] (09/23/2024 00:00)   10/21/24  medical oncology follow up note         Assessment and Plan   Diagnoses and all orders for this visit:    1. Type 2 diabetes mellitus with other circulatory complication, without long-term current use of insulin (Primary)  Overview:  Associated with hypertension, hyperlipidemia     Assessment & Plan:  Diabetes is stable.   Continue current treatment regimen.  Diabetes will be reassessed in 6 months    Orders:  -     POC Glycosylated Hemoglobin (Hb A1C)  -     glipizide (GLUCOTROL XL) 5 MG ER tablet; Take 1 tablet by mouth Daily.  Dispense: 90 tablet; Refill: 3  -     losartan (COZAAR) 100 MG tablet; Take 1 tablet by mouth Daily. Indications: High Blood Pressure, diabetes  Dispense: 90 tablet; Refill: 3    2. Essential hypertension  Assessment & Plan:  Hypertension is stable and controlled  Continue current treatment regimen.  Blood pressure will be reassessed in 3 months.    Orders:  -     losartan (COZAAR) 100 MG tablet; Take 1 tablet by  mouth Daily. Indications: High Blood Pressure, diabetes  Dispense: 90 tablet; Refill: 3  -     amLODIPine (NORVASC) 10 MG tablet; Take 1 tablet by mouth Daily. For high blood pressure.  Dispense: 90 tablet; Refill: 3  -     atenolol (TENORMIN) 100 MG tablet; Take 2 tablets by mouth Daily.  Dispense: 180 tablet; Refill: 3    3. Permanent atrial fibrillation  Comments:  continue chronic anticoagulation  Orders:  -     atenolol (TENORMIN) 100 MG tablet; Take 2 tablets by mouth Daily.  Dispense: 180 tablet; Refill: 3    4. Malignant melanoma of right upper extremity including shoulder  Assessment & Plan:  Follow up with [facility] as scheduled.      5. Influenza vaccine needed  -     Fluzone High-Dose 65+yrs              Follow Up   Return in about 6 months (around 4/29/2025) for Diabetes follow up, A1C in office.  Patient was given instructions and counseling regarding his condition or for health maintenance advice. Please see specific information pulled into the AVS if appropriate.

## 2025-01-27 ENCOUNTER — TELEPHONE (OUTPATIENT)
Dept: INTERNAL MEDICINE | Facility: CLINIC | Age: 80
End: 2025-01-27
Payer: MEDICARE

## 2025-01-27 NOTE — TELEPHONE ENCOUNTER
"  Caller: Bryan Roman \"Don\"    Relationship to patient: Self    Best call back number: [phone]     PATIENT IS CALLING TO GET HIS WEIGHT FOR THE PAST TWO VISITS.  "

## 2025-04-29 ENCOUNTER — OFFICE VISIT (OUTPATIENT)
Dept: INTERNAL MEDICINE | Facility: CLINIC | Age: 80
End: 2025-04-29
Payer: MEDICARE

## 2025-04-29 VITALS
SYSTOLIC BLOOD PRESSURE: 118 MMHG | OXYGEN SATURATION: 99 % | WEIGHT: 214.8 LBS | DIASTOLIC BLOOD PRESSURE: 70 MMHG | HEART RATE: 58 BPM | HEIGHT: 74 IN | BODY MASS INDEX: 27.57 KG/M2 | TEMPERATURE: 97.1 F

## 2025-04-29 DIAGNOSIS — C43.61 MALIGNANT MELANOMA OF RIGHT UPPER EXTREMITY INCLUDING SHOULDER: ICD-10-CM

## 2025-04-29 DIAGNOSIS — E11.59 TYPE 2 DIABETES MELLITUS WITH OTHER CIRCULATORY COMPLICATION, WITHOUT LONG-TERM CURRENT USE OF INSULIN: Primary | ICD-10-CM

## 2025-04-29 DIAGNOSIS — Z78.9 STATIN INTOLERANCE: ICD-10-CM

## 2025-04-29 DIAGNOSIS — Z91.81 AT HIGH RISK FOR FALLS: ICD-10-CM

## 2025-04-29 DIAGNOSIS — I10 ESSENTIAL HYPERTENSION: ICD-10-CM

## 2025-04-29 DIAGNOSIS — Z86.73 HISTORY OF CEREBELLAR STROKE: ICD-10-CM

## 2025-04-29 DIAGNOSIS — Z63.6 CAREGIVER BURDEN: ICD-10-CM

## 2025-04-29 DIAGNOSIS — Z74.09 IMPAIRED FUNCTIONAL MOBILITY AND ENDURANCE: ICD-10-CM

## 2025-04-29 DIAGNOSIS — R63.4 WEIGHT LOSS, UNINTENTIONAL: ICD-10-CM

## 2025-04-29 DIAGNOSIS — I48.21 PERMANENT ATRIAL FIBRILLATION: ICD-10-CM

## 2025-04-29 LAB
EXPIRATION DATE: ABNORMAL
HBA1C MFR BLD: 5.9 % (ref 4.5–5.7)
Lab: ABNORMAL

## 2025-04-29 RX ORDER — MUPIROCIN 20 MG/G
OINTMENT TOPICAL
COMMUNITY
Start: 2025-03-24

## 2025-04-29 SDOH — SOCIAL STABILITY - SOCIAL INSECURITY: DEPENDENT RELATIVE NEEDING CARE AT HOME: Z63.6

## 2025-04-29 NOTE — PROGRESS NOTES
"Chief Complaint  Diabetes    Subjective        Bryan Roman presents to [facility]  History of Present Illness  Had a fall couple of weeks ago, socks on hardwood floors  Has been sore since   Getting PET set up with  for cancer (melanoma)  Weight down  Feels likely related to staying busy at home, having to do all household work and care for his wife         Objective   Vital Signs:  /70   Pulse 58   Temp 97.1 °F (36.2 °C)   Ht 188 cm (74\")   Wt 97.4 kg (214 lb 12.8 oz)   SpO2 99%   BMI 27.58 kg/m²   Estimated body mass index is 27.58 kg/m² as calculated from the following:    Height as of this encounter: 188 cm (74\").    Weight as of this encounter: 97.4 kg (214 lb 12.8 oz).        Wt Readings from Last 3 Encounters:   04/29/25 97.4 kg (214 lb 12.8 oz)   10/29/24 112 kg (247 lb)   07/09/24 115 kg (252 lb 12.8 oz)        Physical Exam  Vitals and nursing note reviewed.   Constitutional:       General: He is not in acute distress.     Appearance: Normal appearance. He is well-developed, well-groomed and overweight. He is not ill-appearing, toxic-appearing or diaphoretic.   HENT:      Head: Normocephalic and atraumatic.      Right Ear: Hearing normal.      Left Ear: Hearing normal.   Eyes:      General: Lids are normal. No scleral icterus.        Right eye: No discharge.         Left eye: No discharge.      Extraocular Movements: Extraocular movements intact.   Pulmonary:      Effort: Pulmonary effort is normal.   Musculoskeletal:      Cervical back: Neck supple.   Skin:     Coloration: Skin is not jaundiced or pale.      Comments: Large surgical scar posterior right shoulder   Neurological:      General: No focal deficit present.      Mental Status: He is alert and oriented to person, place, and time.      Gait: Gait abnormal (using cane).   Psychiatric:         Attention and Perception: Attention and perception normal.         Mood and Affect: Mood and affect normal.  "        Speech: Speech normal.         Behavior: Behavior normal. Behavior is cooperative.         Thought Content: Thought content normal.         Cognition and Memory: Cognition and memory normal.         Judgment: Judgment normal.          Result Review :  The following data was reviewed by: Génesis Vaca MD on 04/16/2025:  Lab Results   Component Value Date    HGBA1C 5.9 (A) 04/29/2025    HGBA1C 5.7 (H) 02/25/2025    HGBA1C 5.8 (A) 10/29/2024      Reviewed 4/8/25  oncology note. Dr Vandana Barajas    TSH RFX ON ABNORMAL TO FREE T4 (02/25/2025 09:21)            Assessment and Plan   Diagnoses and all orders for this visit:    1. Type 2 diabetes mellitus with other circulatory complication, without long-term current use of insulin (Primary)  Overview:  Associated with hypertension, hyperlipidemia     Assessment & Plan:  Diabetes is improving with treatment.   Continue current treatment regimen.  No symptoms of hypotension per patient.  Diabetes will be reassessed in 3 months    Orders:  -     POC Glycosylated Hemoglobin (Hb A1C)  -     Ambulatory Referral to Chronic Care Management Barriers to Care, Care Coordination, High Risk for ED/Readmission, Caregiving/Support    2. Caregiver burden  Comments:  referral to [facility] to see if he qualifies for any type of assistance, caring for himself with cancer along with wife  Orders:  -     Ambulatory Referral to Chronic Care Management Barriers to Care, Care Coordination, High Risk for ED/Readmission, Caregiving/Support    3. Weight loss, unintentional  Comments:  PET being scheduled by ; TSH normal Feb 2025; a1c today normal. may be from more activity levels as wife disabled    4. Essential hypertension  Assessment & Plan:  Hypertension is stable and controlled  Continue current treatment regimen. Need to keep blood pressure tightly controlled given history of stroke and aneurysm of aorta.  Blood pressure will be reassessed  next visit .    Orders:  -     Ambulatory  Referral to Chronic Care Management Barriers to Care, Care Coordination, High Risk for ED/Readmission, Caregiving/Support    5. Malignant melanoma of right upper extremity including shoulder  -     Ambulatory Referral to Chronic Care Management Barriers to Care, Care Coordination, High Risk for ED/Readmission, Caregiving/Support    6. Permanent atrial fibrillation  -     Ambulatory Referral to Chronic Care Management Barriers to Care, Care Coordination, High Risk for ED/Readmission, Caregiving/Support    7. History of cerebellar stroke  Overview:  Admitted at  9/2016 for TIA  MRI brain 6/17/24 showed a new punctate left cerebellar infarct  Statin intolerant.    Orders:  -     Ambulatory Referral to Chronic Care Management Barriers to Care, Care Coordination, High Risk for ED/Readmission, Caregiving/Support    8. At high risk for falls  Assessment & Plan:  Information via AVS regarding lowering fall risks    Orders:  -     Ambulatory Referral to Chronic Care Management Barriers to Care, Care Coordination, High Risk for ED/Readmission, Caregiving/Support    9. Statin intolerance  Overview:  Crestor led to myalgias    Assessment & Plan:  Labs due next time. Prefers to do labs same day as visit (hard to leave wife with health issues).      10. Impaired functional mobility and endurance  Assessment & Plan:  Information regarding lowering fall risk included on AVS                    Follow Up   Return in about 3 months (around 8/11/2025) for Medicare Wellness, Diabetes follow up. Labs due next time. Prefers to do labs same day as visit (hard to leave wife with health issues).    Patient was given instructions and counseling regarding his condition or for health maintenance advice. Please see specific information pulled into the AVS if appropriate.

## 2025-04-29 NOTE — ASSESSMENT & PLAN NOTE
Diabetes is improving with treatment.   Continue current treatment regimen.  No symptoms of hypotension per patient.  Diabetes will be reassessed in 3 months

## 2025-04-29 NOTE — ASSESSMENT & PLAN NOTE
Hypertension is stable and controlled  Continue current treatment regimen. Need to keep blood pressure tightly controlled given history of stroke and aneurysm of aorta.  Blood pressure will be reassessed  next visit .

## 2025-04-29 NOTE — PATIENT INSTRUCTIONS
Fall Prevention in the Home, Adult  Falls can cause injuries and affect people of all ages. There are many simple things that you can do to make your home safe and to help prevent falls.  If you need it, ask for help making these changes.  What actions can I take to prevent falls?  General information  Use good lighting in all rooms. Make sure to:  Replace any light bulbs that burn out.  Turn on lights if it is dark and use night-lights.  Keep items that you use often in easy-to-reach places. Lower the shelves around your home if needed.  Move furniture so that there are clear paths around it.  Do not keep throw rugs or other things on the floor that can make you trip.  If any of your floors are uneven, fix them.  Add color or contrast paint or tape to clearly kristopher and help you see:  Grab bars or handrails.  First and last steps of staircases.  Where the edge of each step is.  If you use a ladder or stepladder:  Make sure that it is fully opened. Do not climb a closed ladder.  Make sure the sides of the ladder are locked in place.  Have someone hold the ladder while you use it.  Know where your pets are as you move through your home.  What can I do in the bathroom?         Keep the floor dry. Clean up any water that is on the floor right away.  Remove soap buildup in the bathtub or shower. Buildup makes bathtubs and showers slippery.  Use non-skid mats or decals on the floor of the bathtub or shower.  Attach bath mats securely with double-sided, non-slip rug tape.  If you need to sit down while you are in the shower, use a non-slip stool.  Install grab bars by the toilet and in the bathtub and shower. Do not use towel bars as grab bars.  What can I do in the bedroom?  Make sure that you have a light by your bed that is easy to reach.  Do not use any sheets or blankets on your bed that hang to the floor.  Have a firm bench or chair with side arms that you can use for support when you get dressed.  What can I do in  the kitchen?  Clean up any spills right away.  If you need to reach something above you, use a sturdy step stool that has a grab bar.  Keep electrical cables out of the way.  Do not use floor polish or wax that makes floors slippery.  What can I do with my stairs?  Do not leave anything on the stairs.  Make sure that you have a light switch at the top and the bottom of the stairs. Have them installed if you do not have them.  Make sure that there are handrails on both sides of the stairs. Fix handrails that are broken or loose. Make sure that handrails are as long as the staircases.  Install non-slip stair treads on all stairs in your home if they do not have carpet.  Avoid having throw rugs at the top or bottom of stairs, or secure the rugs with carpet tape to prevent them from moving.  Choose a carpet design that does not hide the edge of steps on the stairs. Make sure that carpet is firmly attached to the stairs. Fix any carpet that is loose or worn.  What can I do on the outside of my home?  Use bright outdoor lighting.  Repair the edges of walkways and driveways and fix any cracks. Clear paths of anything that can make you trip, such as tools or rocks.  Add color or contrast paint or tape to clearly kristopher and help you see high doorway thresholds.  Trim any bushes or trees on the main path into your home.  Check that handrails are securely fastened and in good repair. Both sides of all steps should have handrails.  Install guardrails along the edges of any raised decks or porches.  Have leaves, snow, and ice cleared regularly. Use sand, salt, or ice melt on walkways during winter months if you live where there is ice and snow.  In the garage, clean up any spills right away, including grease or oil spills.  What other actions can I take?  Review your medicines with your health care provider. Some medicines can make you confused or feel dizzy. This can increase your chance of falling.  Wear closed-toe shoes that  fit well and support your feet. Wear shoes that have rubber soles and low heels.  Use a cane, walker, scooter, or crutches that help you move around if needed.  Talk with your provider about other ways that you can decrease your risk of falls. This may include seeing a physical therapist to learn to do exercises to improve movement and strength.  Where to find more information  Centers for Disease Control and Prevention, [place]: cdc.gov  [place] on Aging: mckayla.nih.gov  [place] on Aging: mckayla.nih.gov  Contact a health care provider if:  You are afraid of falling at home.  You feel weak, drowsy, or dizzy at home.  You fall at home.  Get help right away if you:  Lose consciousness or have trouble moving after a fall.  Have a fall that causes a head injury.  These symptoms may be an emergency. Get help right away. Call 911.  Do not wait to see if the symptoms will go away.  Do not drive yourself to the hospital.  This information is not intended to replace advice given to you by your health care provider. Make sure you discuss any questions you have with your health care provider.  Document Revised: 08/21/2023 Document Reviewed: 08/21/2023  [employer] Patient Education © 2024 [employer]  Sit-to-Stand Exercise    The sit-to-stand exercise (also known as the chair stand or chair rise exercise) strengthens your lower body and helps you maintain or improve your mobility and independence. The end goal is to do the sit-to-stand exercise without using your hands. This will be easier as you become stronger. You should always talk with your health care provider before starting any exercise program, especially if you have had recent surgery.  Do the exercise exactly as told by your health care provider and adjust it as directed. It is normal to feel mild stretching, pulling, tightness, or discomfort as you do this exercise, but you should stop right away if you feel sudden pain or your pain gets worse. Do not  begin doing this exercise until told by your health care provider.  What the sit-to-stand exercise does  The sit-to-stand exercise helps to strengthen the muscles in your thighs and the muscles in the center of your body that give you stability (core muscles). This exercise is especially helpful if:  You have had knee or hip surgery.  You have trouble getting up from a chair, out of a car, or off the toilet due to muscle weakness.  How to do the sit-to-stand exercise  Sit toward the front edge of a sturdy chair without armrests. Your knees should be bent and your feet should be flat on the floor and shoulder-width apart and underneath your hips.  Place your hands lightly on each side of the seat. Keep your back and neck as straight as possible, with your chest slightly forward.  Breathe in slowly. Lean forward and slightly shift your weight to the front of your feet.  Breathe out as you slowly stand up. Try not to support any weight with your hands.  Stand and pause for a full breath in and out.  Breathe in as you sit down slowly. Tighten your core and abdominal muscles to control your lowering as much as possible. You should lower yourself back to the chair slowly, not just drop back into the seat.  Breathe out slowly.  Do this exercise 10-15 times. If needed, do it fewer times until you build up strength.  Rest for 1 minute, then do another set of 10-15 repetitions.  To change the difficulty of the sit-to-stand exercise  If the exercise is too difficult, use a chair with sturdy armrests, and push off the armrests to help you come to the standing position. You can also use the armrests to help slowly lower yourself back to sitting. As this gets easier, try to use your arms less. You can also place a firm cushion or pillow on the chair to make the surface higher.  If this exercise is too easy, do not use your arms to help raise or lower yourself. You can also wear a weighted vest, use hand weights, increase your  repetitions, or try a lower chair.  General tips  You may feel tired when starting an exercise routine. This is normal.  You may have muscle soreness that lasts a few days. This is normal. As you get stronger, you may not feel muscle soreness.  Use smooth, steady movements.  Do not  hold your breath during strength exercises. This can cause unsafe changes in your blood pressure.  Breathe in slowly through your nose, and breathe out slowly through your mouth.  Summary  Strengthening your lower body is an important step to help you move safely and independently.  The sit-to-stand exercise helps strengthen the muscles in your thighs and core.  You should always talk with your health care provider before starting any exercise program, especially if you have had recent surgery.  This information is not intended to replace advice given to you by your health care provider. Make sure you discuss any questions you have with your health care provider.  Document Revised: 04/10/2022 Document Reviewed: 04/10/2022  [employer] Patient Education © 2024 [employer]  Exercising to Stay Healthy  To become healthy and stay healthy, it is recommended that you do moderate-intensity and vigorous-intensity exercise. You can tell that you are exercising at a moderate intensity if your heart starts beating faster and you start breathing faster but can still hold a conversation. You can tell that you are exercising at a vigorous intensity if you are breathing much harder and faster and cannot hold a conversation while exercising.  How can exercise benefit me?  Exercising regularly is important. It has many health benefits, such as:  Improving overall fitness, flexibility, and endurance.  Increasing bone density.  Helping with weight control.  Decreasing body fat.  Increasing muscle strength and endurance.  Reducing stress and tension, anxiety, depression, or anger.  Improving overall health.  What guidelines should I follow while  exercising?  Before you start a new exercise program, talk with your health care provider.  Do not exercise so much that you hurt yourself, feel dizzy, or get very short of breath.  Wear comfortable clothes and wear shoes with good support.  Drink plenty of water while you exercise to prevent dehydration or heat stroke.  Work out until your breathing and your heartbeat get faster (moderate intensity).  How often should I exercise?  Choose an activity that you enjoy, and set realistic goals. Your health care provider can help you make an activity plan that is individually designed and works best for you.  Exercise regularly as told by your health care provider. This may include:  Doing strength training two times a week, such as:  Lifting weights.  Using resistance bands.  Push-ups.  Sit-ups.  Yoga.  Doing a certain intensity of exercise for a given amount of time. Choose from these options:  A total of 150 minutes of moderate-intensity exercise every week.  A total of 75 minutes of vigorous-intensity exercise every week.  A mix of moderate-intensity and vigorous-intensity exercise every week.  Children, pregnant women, people who have not exercised regularly, people who are overweight, and older adults may need to talk with a health care provider about what activities are safe to perform. If you have a medical condition, be sure to talk with your health care provider before you start a new exercise program.  What are some exercise ideas?  Moderate-intensity exercise ideas include:  Walking 1 mile (1.6 km) in about 15 minutes.  Biking.  Hiking.  Golfing.  Dancing.  Water aerobics.  Vigorous-intensity exercise ideas include:  Walking 4.5 miles (7.2 km) or more in about 1 hour.  Jogging or running 5 miles (8 km) in about 1 hour.  Biking 10 miles (16.1 km) or more in about 1 hour.  Lap swimming.  Roller-skating or in-line skating.  Cross-country skiing.  Vigorous competitive sports, such as football, basketball, and  soccer.  Jumping rope.  Aerobic dancing.  What are some everyday activities that can help me get exercise?  Yard work, such as:  Pushing a .  Raking and bagging leaves.  Washing your car.  Pushing a stroller.  Shoveling snow.  Gardening.  Washing windows or floors.  How can I be more active in my day-to-day activities?  Use stairs instead of an elevator.  Take a walk during your lunch break.  If you drive, park your car farther away from your work or school.  If you take public transportation, get off one stop early and walk the rest of the way.  Stand up or walk around during all of your indoor phone calls.  Get up, stretch, and walk around every 30 minutes throughout the day.  Enjoy exercise with a friend. Support to continue exercising will help you keep a regular routine of activity.  Where to find more information  You can find more information about exercising to stay healthy from:  U.S. Department of Health and Human Services: www.hhs.gov  Centers for Disease Control and Prevention (CDC): www.cdc.gov  Summary  Exercising regularly is important. It will improve your overall fitness, flexibility, and endurance.  Regular exercise will also improve your overall health. It can help you control your weight, reduce stress, and improve your bone density.  Do not exercise so much that you hurt yourself, feel dizzy, or get very short of breath.  Before you start a new exercise program, talk with your health care provider.  This information is not intended to replace advice given to you by your health care provider. Make sure you discuss any questions you have with your health care provider.  Document Revised: 04/15/2022 Document Reviewed: 04/15/2022  Elsevier Patient Education © 2024 [employer].

## 2025-04-30 ENCOUNTER — REFERRAL TRIAGE (OUTPATIENT)
Dept: CASE MANAGEMENT | Facility: OTHER | Age: 80
End: 2025-04-30
Payer: MEDICARE

## 2025-04-30 DIAGNOSIS — Z74.09 IMPAIRED FUNCTIONAL MOBILITY AND ENDURANCE: ICD-10-CM

## 2025-04-30 DIAGNOSIS — E11.51 TYPE 2 DIABETES MELLITUS WITH DIABETIC PERIPHERAL ANGIOPATHY WITHOUT GANGRENE, WITHOUT LONG-TERM CURRENT USE OF INSULIN: Primary | ICD-10-CM

## 2025-05-02 ENCOUNTER — PATIENT OUTREACH (OUTPATIENT)
Dept: CASE MANAGEMENT | Facility: OTHER | Age: 80
End: 2025-05-02
Payer: MEDICARE

## 2025-05-02 DIAGNOSIS — E11.51 TYPE 2 DIABETES MELLITUS WITH DIABETIC PERIPHERAL ANGIOPATHY WITHOUT GANGRENE, WITHOUT LONG-TERM CURRENT USE OF INSULIN: Primary | ICD-10-CM

## 2025-05-02 DIAGNOSIS — Z74.09 IMPAIRED FUNCTIONAL MOBILITY AND ENDURANCE: ICD-10-CM

## 2025-05-02 NOTE — OUTREACH NOTE
AMBULATORY CASE MANAGEMENT NOTE    Names and Relationships of Patient/Support Persons: Contact: MATTHIEU MCCLOUD; Relationship: Emergency Contact -     Care Coordination    ELIEZER CM received call from Patient's daughter/Megan. Megan is seeking help and services to support her parents with their medical and home needs. Provided contact information for ELIEZER DEVLIN and set up an appointment by phone per family request to discuss all concerns and needs. ELIEZER CM will contact them at approximately 3 PM on Thursday, May 8th. Next outreach scheduled.    Wendy GUARDADO  Ambulatory Case Management    5/2/2025, 15:23 EDT

## 2025-05-08 ENCOUNTER — TELEPHONE (OUTPATIENT)
Dept: CASE MANAGEMENT | Facility: OTHER | Age: 80
End: 2025-05-08
Payer: MEDICARE

## 2025-05-08 DIAGNOSIS — Z74.09 IMPAIRED FUNCTIONAL MOBILITY AND ENDURANCE: ICD-10-CM

## 2025-05-08 DIAGNOSIS — E11.51 TYPE 2 DIABETES MELLITUS WITH DIABETIC PERIPHERAL ANGIOPATHY WITHOUT GANGRENE, WITHOUT LONG-TERM CURRENT USE OF INSULIN: Primary | ICD-10-CM

## 2025-05-08 NOTE — TELEPHONE ENCOUNTER
ELIEZER CM outreach with Patient; left a voicemail requesting a return call to MARIAA Nguyen with [facility] at [phone].

## 2025-05-19 ENCOUNTER — TELEPHONE (OUTPATIENT)
Dept: CASE MANAGEMENT | Facility: OTHER | Age: 80
End: 2025-05-19
Payer: MEDICARE

## 2025-05-19 DIAGNOSIS — E11.51 TYPE 2 DIABETES MELLITUS WITH DIABETIC PERIPHERAL ANGIOPATHY WITHOUT GANGRENE, WITHOUT LONG-TERM CURRENT USE OF INSULIN: Primary | ICD-10-CM

## 2025-05-19 DIAGNOSIS — Z74.09 IMPAIRED FUNCTIONAL MOBILITY AND ENDURANCE: ICD-10-CM

## 2025-05-19 NOTE — TELEPHONE ENCOUNTER
ELIEZER PRINCE outreach with Patient; no answer and unable to leave a voice mail requesting a return call to MARIAA Nguyen with [facility] at [phone].

## 2025-05-20 ENCOUNTER — TELEPHONE (OUTPATIENT)
Dept: CASE MANAGEMENT | Facility: OTHER | Age: 80
End: 2025-05-20
Payer: MEDICARE

## 2025-05-20 ENCOUNTER — PATIENT OUTREACH (OUTPATIENT)
Dept: CASE MANAGEMENT | Facility: OTHER | Age: 80
End: 2025-05-20
Payer: MEDICARE

## 2025-05-20 DIAGNOSIS — Z74.09 IMPAIRED FUNCTIONAL MOBILITY AND ENDURANCE: ICD-10-CM

## 2025-05-20 DIAGNOSIS — E11.51 TYPE 2 DIABETES MELLITUS WITH DIABETIC PERIPHERAL ANGIOPATHY WITHOUT GANGRENE, WITHOUT LONG-TERM CURRENT USE OF INSULIN: Primary | ICD-10-CM

## 2025-05-20 NOTE — OUTREACH NOTE
AMBULATORY CASE MANAGEMENT NOTE    Care Coordination    AMB CM made multiple attempts to contact Patient. AMB CM spoke with daughter and set up an appointment to speak with her and Patient at a time of their choosing and call was not answered. AMB CM left a voicemail message with contact information requesting to reschedule at their convenience. AMB CM will close referral but will re-open should Patient call back. CCM; Closed, Unable to reach Patient.    Wendy GUARDADO  Ambulatory Case Management    5/20/2025, 09:25 EDT

## 2025-05-20 NOTE — OUTREACH NOTE
"AMBULATORY CASE MANAGEMENT NOTE    Names and Relationships of Patient/Support Persons: Contact: Bryan Roman James \"Don\"; Relationship: Self -     Patient Outreach    Patient returned call to [facility]. Reviewed referral to Chronic Care Management from provider. Patient stated that \"it sounds nice but I only need help with my wife\". He is currently receiving treatment for Melanoma and caring for his wife. Reports that she recently completed short term rehab at [facility] but continues to require a lot of help and has fallen out of bed several times.     Patient states that his primary focus is on securing his wife a hospital bed, rescheduling her missed appointments with Dr. Vaca and getting help at home. Reports that he pays $300 a month for his supplement so that they will have no copay or be out of pocket for any medical care. He would like to hire a caregiver that is covered by his supplement.      Reviewed that insurance/supplements are for medical care and that in home care givers who assist with bathing, dressing or eating are not covered by [employer] or supplements. Inquired as to whether they have a long term care option/policy and he does not think they do. Reviewed that primary focus of [facility] is on medical needs but will refer to [facility] for help with any resources related to home care/assistance.    Patient stated that he needed to complete another infusion but would call CM back on this date.     CCM: Closed: Patient not interested.     Care Coordination    Care coordination with [facility]. With Patient permission referral sent to [facility] for assistance with home care options.     PLAN:  [facility]  Review with Patient the option of working with an Oncology CM.    Wendy GUARDADO  Ambulatory Case Management    5/20/2025, 10:23 EDT  "

## 2025-05-20 NOTE — TELEPHONE ENCOUNTER
ELIEZER PRINCE outreach with Patient; no answer and unable to leave a voicemail requesting a return call to MARIAA Nguyen with Chronic Care [facility] at [phone].

## 2025-05-22 ENCOUNTER — PATIENT OUTREACH (OUTPATIENT)
Age: 80
End: 2025-05-22
Payer: MEDICARE

## 2025-05-22 NOTE — OUTREACH NOTE
SW attempted contact with [facility]. Mail box is full. Not accepting messages at this time. SW will attempt again in a couple of business days.     Kate RANDHAWA -   Ambulatory Case Management    5/22/2025, 11:25 EDT

## 2025-05-27 ENCOUNTER — PATIENT OUTREACH (OUTPATIENT)
Dept: CASE MANAGEMENT | Facility: OTHER | Age: 80
End: 2025-05-27
Payer: MEDICARE

## 2025-05-27 ENCOUNTER — TELEPHONE (OUTPATIENT)
Dept: BEHAVIORAL HEALTH | Facility: CLINIC | Age: 80
End: 2025-05-27
Payer: MEDICARE

## 2025-05-27 DIAGNOSIS — R53.1 WEAKNESS: ICD-10-CM

## 2025-05-27 DIAGNOSIS — Z91.81 AT HIGH RISK FOR FALLS: ICD-10-CM

## 2025-05-27 DIAGNOSIS — Z86.73 HISTORY OF CEREBELLAR STROKE: ICD-10-CM

## 2025-05-27 DIAGNOSIS — E11.51 TYPE 2 DIABETES MELLITUS WITH DIABETIC PERIPHERAL ANGIOPATHY WITHOUT GANGRENE, WITHOUT LONG-TERM CURRENT USE OF INSULIN: Primary | ICD-10-CM

## 2025-05-27 DIAGNOSIS — I10 ESSENTIAL HYPERTENSION: ICD-10-CM

## 2025-05-27 DIAGNOSIS — Z74.09 IMPAIRED FUNCTIONAL MOBILITY AND ENDURANCE: ICD-10-CM

## 2025-05-27 DIAGNOSIS — C43.61 MALIGNANT MELANOMA OF RIGHT UPPER EXTREMITY INCLUDING SHOULDER: ICD-10-CM

## 2025-05-27 DIAGNOSIS — R63.4 WEIGHT LOSS, UNINTENTIONAL: Primary | ICD-10-CM

## 2025-05-27 DIAGNOSIS — E11.59 TYPE 2 DIABETES MELLITUS WITH OTHER CIRCULATORY COMPLICATION, WITHOUT LONG-TERM CURRENT USE OF INSULIN: ICD-10-CM

## 2025-05-27 NOTE — OUTREACH NOTE
"AMBULATORY CASE MANAGEMENT NOTE    Names and Relationships of Patient/Support Persons: Contact: Bryan Roman \"Don\"; Relationship: Self -     Patient Outreach    [facility] outreach with Patient. Reviewed daughter's request for home health and possible upcoming knee surgery from a fall. Patient states that he fell several weeks ago and that his knee is sore and he has trouble getting around but what is most concerning to him was the results of the PET scan. PET scan showed concerning areas on the knee and he was referred to Dr. Farias. He continues to drive himself to  for cancer treatments but admits that it is difficult. States that his daughter can not provide transportation due to her summer teaching schedule.     Per Patient he and his wife need help at home and this is why his daughter was calling about home health. Reviewed that insurance would only pay for home health if there is a skilled need like nursing for wound care/disease education or PT for falls/mobility. Reinforced that home health would not provide home assistance or even be in the home daily. Patient expressed frustration and stated that his wife keeps falling out of bed because the bed is too high and he has to call EMS to come pick her up.    Reviewed that ELIEZER DEVLIN would be reaching out to him to discuss available resources for home care. Discussed the Oncology CM program and he declined referral or participation at this time.    An extensive amount of time was spent with Patient.    Care Coordination    Care coordination with Primary Care. ELIEZER PRINCE assisted Patient with scheduling an office visit for his wife to discuss request for home health and a hospital bed. Office visit scheduled for Friday, May 30th at 4:00 PM with Dr. Vaca. He voiced acceptance and understanding of appointment as scheduled.    Wendy GUARDADO  Ambulatory Case Management    5/27/2025, 13:03 EDT  "

## 2025-05-28 ENCOUNTER — PATIENT OUTREACH (OUTPATIENT)
Age: 80
End: 2025-05-28
Payer: MEDICARE

## 2025-05-28 ENCOUNTER — TELEPHONE (OUTPATIENT)
Dept: INTERNAL MEDICINE | Facility: CLINIC | Age: 80
End: 2025-05-28
Payer: MEDICARE

## 2025-05-28 NOTE — TELEPHONE ENCOUNTER
MATTHIEU CALLED TO SEE IF SHE COULD GET A REFERRAL FOR [facility]. HE HAS FEEL AND IS NOW GOING TO HAVE TO HAVE KNEE SURGERY.   
Please advise  
Spoke with daughter. Message relayed due to patient having an upcoming knee surgery.    
(0) understands/communicates without difficulty

## 2025-05-28 NOTE — TELEPHONE ENCOUNTER
Caller: LOBITO CALABRESE/[facility]    Relationship: Home Health    Best call back number: [phone]    What was the call regarding: HOME HEALTH AGENCY WAS CALLING AS THEY RECIEVED THE REFERRAL FOR HOME HEALTH SERVICES, BUT NEITHER AGENCY CAN ACCEPT HIM AS [employer] DOES NOT COVER [employer], AND [facility] DOES NOT HAVE A [employer] CONTRACT AND ARE AT [place]. THE PATIENT SHOULD BE REFERRED TO A DIFFENT AGENCY.

## 2025-05-28 NOTE — OUTREACH NOTE
Social Work Assessment  Questions/Answers      Flowsheet Row Most Recent Value   Referral Source nursing   Reason for Consult community resources   Preferred Language English   People in Home spouse   Current Living Arrangements home   Potentially Unsafe Housing Conditions none   In the past 12 months has the electric, gas, oil, or water company threatened to shut off services in your home? No   Primary Care Provided by self   Provides Primary Care For spouse   Family Caregiver if Needed none   Quality of Family Relationships non-existent   Source of Income social security   Financial/Environmental Concerns other (see comments)   Application for Public Assistance obtained public assistance pending number   Medications independent   Meal Preparation independent   Housekeeping independent   Laundry independent   Shopping independent   If for any reason you need help with day-to-day activities such as bathing, preparing meals, shopping, managing finances, etc., do you get the help you need? I could use a little more help   Q1: How often do you have a drink containing alcohol? Never   Q2: How many drinks containing alcohol do you have on a typical day when you are drinking? None   Q3: How often do you have six or more drinks on one occasion? Never   Audit-C Score 0        SDOH updated and reviewed with the patient during this program:  --     Alcohol Use: Not At Risk (5/28/2025)    AUDIT-C     Frequency of Alcohol Consumption: Never     Average Number of Drinks: Patient does not drink     Frequency of Binge Drinking: Never      --     Depression: Not at risk (5/20/2025)    Received from [facility]    PHQ-2     Patient Health Questionnaire-2 Score: 0        --      Received from [facility]    Employment      Financial Resource Strain: Low Risk  (5/28/2025)    Overall Financial Resource Strain (CARDIA)     Difficulty of Paying Living Expenses: Not very hard      --     Food Insecurity: No Food  Insecurity (5/28/2025)    Hunger Vital Sign     Worried About Running Out of Food in the Last Year: Never true     Ran Out of Food in the Last Year: Never true      --     Health Literacy: Unknown (5/28/2025)    Education     Preferred Language: English      --     Housing Stability: Unknown (5/28/2025)    Housing Stability Vital Sign     Unable to Pay for Housing in the Last Year: No     Homeless in the Last Year: No      --     Interpersonal Safety: Not At Risk (5/28/2025)    Humiliation, Afraid, Rape, and Kick questionnaire     Fear of Current or Ex-Partner: No     Emotionally Abused: No     Physically Abused: No     Sexually Abused: No      --     Physical Activity: Inactive (5/28/2025)    Exercise Vital Sign     Days of Exercise per Week: 0 days     Minutes of Exercise per Session: 0 min      --     Social Connections: Unknown (5/28/2025)    Social Connection and Isolation Panel [NHANES]     Frequency of Communication with Friends and Family: Once a week     Marital Status:    Recent Concern: Social Connections - At Risk (5/28/2025)    Family and Community Support     Help with Day-to-Day Activities: I could use a little more help      --     Stress: Stress Concern Present (5/28/2025)    [employer] [place] [employer] - Occupational Stress Questionnaire     Feeling of Stress : Rather much      --     Tobacco Use: Low Risk  (5/20/2025)    Received from [facility]    Patient History     Smoking Tobacco Use: Never     Smokeless Tobacco Use: Never     Passive Exposure: Never   Recent Concern: Tobacco Use - Medium Risk (4/29/2025)    Patient History     Smoking Tobacco Use: Former     Smokeless Tobacco Use: Never     Passive Exposure: Never      --     Transportation Needs: No Transportation Needs (5/28/2025)    PRAPARE - Transportation     Lack of Transportation (Medical): No     Lack of Transportation (Non-Medical): No      --     Utilities: Not At Risk (5/28/2025)    [facility] Utilities      Threatened with loss of utilities: No      Continuing Care   [facility]    [address] , [facility] [address]    Phone: [phone]    Request Status: Accepted    Services: Elder Community Support/Recreation    Resource for: Social Connections   Patient Outreach    SW spoke with pt re his referral due to care giving stress. SW and pt discussed the limitations of insurance paying for caregivers. SW and pt discussed contacting [facility] to get screened for any programs he and spouse may qualify for and to inquire about any in-home programs they may have including the waiver waitlist. Pt expressed thanks. CLAUS will continue to monitor for the next thirty days.     Kate RANDHAWA -   Ambulatory Case Management    5/28/2025, 13:55 EDT

## 2025-06-05 ENCOUNTER — TRANSCRIBE ORDERS (OUTPATIENT)
Dept: ADMINISTRATIVE | Facility: HOSPITAL | Age: 80
End: 2025-06-05
Payer: MEDICARE

## 2025-06-05 DIAGNOSIS — M25.561 RIGHT KNEE PAIN, UNSPECIFIED CHRONICITY: Primary | ICD-10-CM

## 2025-06-09 ENCOUNTER — HOSPITAL ENCOUNTER (OUTPATIENT)
Dept: CT IMAGING | Facility: HOSPITAL | Age: 80
Discharge: HOME | End: 2025-06-09
Admitting: PHYSICIAN ASSISTANT
Payer: MEDICARE

## 2025-06-09 DIAGNOSIS — M25.561 RIGHT KNEE PAIN, UNSPECIFIED CHRONICITY: ICD-10-CM

## 2025-06-09 PROCEDURE — 73700 CT LOWER EXTREMITY W/O DYE: CPT

## 2025-06-10 ENCOUNTER — TELEPHONE (OUTPATIENT)
Dept: INTERNAL MEDICINE | Facility: CLINIC | Age: 80
End: 2025-06-10
Payer: MEDICARE

## 2025-06-10 RX ORDER — NYSTATIN 100000 [USP'U]/ML
500000 SUSPENSION ORAL 4 TIMES DAILY
Qty: 473 ML | Refills: 0 | Status: SHIPPED | OUTPATIENT
Start: 2025-06-10

## 2025-06-10 NOTE — TELEPHONE ENCOUNTER
Pharmacy Name: [facility] [address] [place] [address] [phone] [facility] [phone]           Pharmacy representative name: BRANDY    Pharmacy representative phone number: [phone]    What medication are you calling in regards to:  nystatin (MYCOSTATIN) 100,000 unit/mL suspension    What question does the pharmacy have: IT DOESN'T HAVE DATES OF SUPPLY    Who is the provider that prescribed the medication: DR. POWELL    Additional notes: PLEASE ADVISE

## 2025-06-10 NOTE — TELEPHONE ENCOUNTER
Caller: [facility]     Relationship: Home Health    Best call back number: [phone]    What medication are you requesting: SWISH AND SWALLOW    What are your current symptoms: PATIENT HAS NOT BEEN ABLE TO EAT, STATES EVERYTHING TASTE LIKE METAL, HAS A WHITE COATING IN THE INSIDE OF HIS MOUTH  AND IS RED AND LOOKS IRRITATED.HE HAS LOST 8-9 POUNDS WITHIN A WEEK DUE TO THE SYMPTOMS.     How long have you been experiencing symptoms: AT LEAST 7 DAYS    Have you had these symptoms before:    [x] Yes  [] No    Have you been treated for these symptoms before:   [x] Yes  [] No    If a prescription is needed, what is your preferred pharmacy and phone number: [facility] - [address] [phone] [facility] [phone]

## 2025-06-13 ENCOUNTER — TELEPHONE (OUTPATIENT)
Dept: INTERNAL MEDICINE | Facility: CLINIC | Age: 80
End: 2025-06-13
Payer: MEDICARE

## 2025-06-13 NOTE — TELEPHONE ENCOUNTER
Caller: [facility]    Relationship: Home Health    Best call back number: [phone]     What orders are you requesting (i.e. lab or imaging): VERBAL ORDER FOR SPEECH THERAPY, SWALLOWING, CHOKING, COGNITION

## 2025-06-17 ENCOUNTER — OUTSIDE FACILITY SERVICE (OUTPATIENT)
Dept: INTERNAL MEDICINE | Facility: CLINIC | Age: 80
End: 2025-06-17
Payer: MEDICARE

## 2025-06-17 ENCOUNTER — TELEPHONE (OUTPATIENT)
Dept: INTERNAL MEDICINE | Facility: CLINIC | Age: 80
End: 2025-06-17

## 2025-06-17 NOTE — TELEPHONE ENCOUNTER
Caller: [facility]    Relationship: Home Health    Best call back number: [phone]     Who are you requesting to speak with (clinical staff, provider,  specific staff member): CLINICAL    What was the call regarding: HAS AREA BETWEEN BUTTOCK, IT'S IRRITATED AND PAINFUL, CAN APPLY A ZINC BASED MEDICATION TO THE AREA    Is it okay if the provider responds through MyChart:   VERBAL ORDER

## 2025-06-18 ENCOUNTER — TELEPHONE (OUTPATIENT)
Dept: CARDIOLOGY | Facility: CLINIC | Age: 80
End: 2025-06-18

## 2025-06-18 NOTE — TELEPHONE ENCOUNTER
PT IS UNABLE TO COME IN AT THIS TIME, DUE TO HEALTH ISSUES.  [facility]

## 2025-06-23 ENCOUNTER — PATIENT OUTREACH (OUTPATIENT)
Dept: CASE MANAGEMENT | Facility: OTHER | Age: 80
End: 2025-06-23
Payer: MEDICARE

## 2025-06-23 DIAGNOSIS — Z74.09 IMPAIRED FUNCTIONAL MOBILITY AND ENDURANCE: ICD-10-CM

## 2025-06-23 DIAGNOSIS — E11.51 TYPE 2 DIABETES MELLITUS WITH DIABETIC PERIPHERAL ANGIOPATHY WITHOUT GANGRENE, WITHOUT LONG-TERM CURRENT USE OF INSULIN: Primary | ICD-10-CM

## 2025-06-23 NOTE — OUTREACH NOTE
"AMBULATORY CASE MANAGEMENT NOTE    Names and Relationships of Patient/Support Persons: Contact: Bryan Roman \"Don\"; Relationship: Self -     Patient Outreach    AMB-RN outreach with Patient. States that he is doing well and home health is helping his wife. Reports daughter from out of state is coming to stay the week and help out with Chapis being on vacation. Denied any current needs or concerns. Reinforced contacting AMB-RN or office for any needs. No additional questions at this time. Next outreach scheduled.    Wendy GUARDADO  Ambulatory Case Management    6/23/2025, 12:51 EDT  "

## 2025-06-24 ENCOUNTER — TELEPHONE (OUTPATIENT)
Dept: INTERNAL MEDICINE | Facility: CLINIC | Age: 80
End: 2025-06-24

## 2025-06-24 ENCOUNTER — TELEPHONE (OUTPATIENT)
Dept: INTERNAL MEDICINE | Facility: CLINIC | Age: 80
End: 2025-06-24
Payer: MEDICARE

## 2025-06-24 ENCOUNTER — PATIENT OUTREACH (OUTPATIENT)
Dept: CASE MANAGEMENT | Facility: OTHER | Age: 80
End: 2025-06-24
Payer: MEDICARE

## 2025-06-24 DIAGNOSIS — E11.51 TYPE 2 DIABETES MELLITUS WITH DIABETIC PERIPHERAL ANGIOPATHY WITHOUT GANGRENE, WITHOUT LONG-TERM CURRENT USE OF INSULIN: Primary | ICD-10-CM

## 2025-06-24 DIAGNOSIS — R64 CANCER CACHEXIA: Primary | ICD-10-CM

## 2025-06-24 DIAGNOSIS — Z74.09 IMPAIRED FUNCTIONAL MOBILITY AND ENDURANCE: ICD-10-CM

## 2025-06-24 RX ORDER — MEGESTROL ACETATE 20 MG/1
20 TABLET ORAL DAILY
Qty: 30 TABLET | Refills: 1 | Status: SHIPPED | OUTPATIENT
Start: 2025-06-24 | End: 2025-06-25 | Stop reason: RX

## 2025-06-24 NOTE — TELEPHONE ENCOUNTER
Caller: [facility] - [place], [place] - [address] [facility] - [phone] [facility] [phone] FX    Relationship: Pharmacy    Best call back number: [phone]     What is the best time to reach you: ANYTIME    Who are you requesting to speak with (clinical staff, provider,  specific staff member): CLINICAL STAFF    Do you know the name of the person who called: TRUNG    What was the call regarding: PATIENT'S PHARMACY CALLED TO INFORM DR PURVIS THAT, IN REGARDS TO THE MEGESTROL, BOTH THE 20MG AND 40MG TALBETS ARE ON BACKORDER    TRUNG WANTED TO KNOW IF DR PURVIS WOULD LIKE TO CHANGE THAT TO THE LIQUID FORM    PLEASE ADVISE

## 2025-06-24 NOTE — OUTREACH NOTE
"AMBULATORY CASE MANAGEMENT NOTE    Names and Relationships of Patient/Support Persons: Contact: Abel Bryanjohnny Ramirez \"Don\"; Relationship: Self -     Patient Outreach    AMB-RN outreach with Patient. Reviewed message from home health nurse regarding request for medication to assist with appetite. Patient stated that he was interested in medications to help increase his appetite but he was more concerned about his constipation. Reports last bowel movement 5 days ago; tried stool softener for a couple days with no results and tried Miralax for a couple days with no results. Reports that Milk of Magnesia was helpful the last time. Encouraged use of MOM as directed per OTC. Reinforced activity and water intake in relation to constipation. Educated on use of stool softeners and Miralax requiring consistency and water. Reviewed that a suppository or enema may be needed. He verbalized understanding and if he does not produce a bowel movement tonight he will contact the office for additional help. Next outreach scheduled.    Education Documentation  Unresolved/Worsening Symptoms, taught by Wendy Cantu, RN at 6/24/2025  4:26 PM.  Learner: Patient  Readiness: Acceptance  Method: Explanation  Response: Verbalizes Understanding    Medication Management, taught by Wendy Cantu, RN at 6/24/2025  4:26 PM.  Learner: Patient  Readiness: Acceptance  Method: Explanation  Response: Verbalizes Understanding    Activity, taught by Wendy Cantu, RN at 6/24/2025  4:26 PM.  Learner: Patient  Readiness: Acceptance  Method: Explanation  Response: Verbalizes Understanding        Wendy GUARDADO  Ambulatory Case Management    6/24/2025, 16:27 EDT  "

## 2025-06-24 NOTE — TELEPHONE ENCOUNTER
Matthew, nurse with [facility], called stating patient mouth is improving with nystatin swish and swallow. She states his weight on 6/19/25 was 193. She is asking if they can try some megace to help with his appetite since his mouth is now improved. Call her at [phone]

## 2025-06-25 RX ORDER — MEGESTROL ACETATE 40 MG/ML
200 SUSPENSION ORAL DAILY
Qty: 240 ML | Refills: 1 | Status: SHIPPED | OUTPATIENT
Start: 2025-06-25

## 2025-06-25 NOTE — TELEPHONE ENCOUNTER
Left message on Matthew's voicemail that rx megace was sent. Called and spoke with patient to let him know also.

## 2025-06-27 ENCOUNTER — PATIENT OUTREACH (OUTPATIENT)
Age: 80
End: 2025-06-27
Payer: MEDICARE

## 2025-06-27 NOTE — OUTREACH NOTE
Chart review as monitoring period is set to . No new SDOH needs noted in chart. SW to D/c as initial outreach goal has been met.     Kate RANDHAWA -   Ambulatory Case Management    2025, 14:01 EDT

## 2025-07-10 ENCOUNTER — READMISSION MANAGEMENT (OUTPATIENT)
Dept: CALL CENTER | Facility: HOSPITAL | Age: 80
End: 2025-07-10
Payer: MEDICARE

## 2025-07-10 DIAGNOSIS — M1A.00X0 IDIOPATHIC CHRONIC GOUT WITHOUT TOPHUS, UNSPECIFIED SITE: Primary | ICD-10-CM

## 2025-07-10 NOTE — OUTREACH NOTE
Prep Survey      Flowsheet Row Responses   Gnosticist facility patient discharged from? Non-BH   Is LACE score < 7 ? Non-BH Discharge   Eligibility [facility]   Date of Admission 07/04/25   Date of Discharge 07/10/25   Discharge Disposition Home or Self Care   Discharge diagnosis Generalized weakness/UTI   Does the patient have one of the following disease processes/diagnoses(primary or secondary)? Other   Does the patient have Home health ordered? Yes   Prep survey completed? Yes            RAVEN TROY - Registered Nurse

## 2025-07-11 ENCOUNTER — TRANSITIONAL CARE MANAGEMENT TELEPHONE ENCOUNTER (OUTPATIENT)
Dept: CALL CENTER | Facility: HOSPITAL | Age: 80
End: 2025-07-11
Payer: MEDICARE

## 2025-07-11 RX ORDER — COLCHICINE 0.6 MG/1
TABLET ORAL
Qty: 3 TABLET | Refills: 2 | Status: SHIPPED | OUTPATIENT
Start: 2025-07-11

## 2025-07-11 NOTE — TELEPHONE ENCOUNTER
Rx Refill Note  Requested Prescriptions     Pending Prescriptions Disp Refills    colchicine 0.6 MG tablet [Pharmacy Med Name: COLCHICINE 0.6 MG TABS 0.6 Tablet] 3 tablet 2     Sig: TAKE TWO TABLETS BY MOUTH THEN TAKE ONE TABLET BY MOUTH ONE HOUR LATER FOR GOUT FLARE      Last office visit with prescribing clinician: 4/29/2025   Last telemedicine visit with prescribing clinician: Visit date not found   Next office visit with prescribing clinician: 8/19/2025

## 2025-07-11 NOTE — OUTREACH NOTE
Call Center TCM Note      Flowsheet Row Responses   [facility] patient discharged from? Non-  [[facility]   Does the patient have one of the following disease processes/diagnoses(primary or secondary)? Other   TCM attempt successful? Yes   Call start time 0943   Call end time 0949   Discharge diagnosis Generalized weakness/UTI   List who call center can speak with Chapis Gallegos--Daughter   Person spoke with today (if not patient) and relationship Chapis Gallegos--Daughter   Meds reviewed with patient/caregiver? Yes   Is the patient having any side effects they believe may be caused by any medication additions or changes? No   Does the patient have all medications ordered at discharge? Yes   Is the patient taking all medications as directed (includes completed medication regime)? Yes   Comments Daughter unable to get patient into the doctor office and is going to take patient back to ED for evaluation now due to urinary retention. Patient has not voided in over 24 hours.   Does the patient have an appointment with their PCP within 7-14 days of discharge? No   Nursing Interventions Patient declined scheduling/rescheduling appointment at this time   What is the Home health agency?  [facility]    Psychosocial issues? Yes   Psychosocial comments Daughter reports patient is requiring 24/7 care now. The past 3 weeks patient has been confused, not walking, recently diagnosed with Dementia, fast progression. Daughter is working on finding a memory care center for veterans.   Comments Daughter states patient had UTI in the hospital at [place]. A stevens catheter was placed due to urinary retention. Catheter was removed prior to discharge. Daughter reports patient has not urinated for over 24 hours. Advised patient needs to be seen in the ED immediately. Daughter states patient is in a wheelchair and she cannot get him to the hospital by herself. Advised to call 911 and have ambulance take him to the ED for  evaluation. Daughter verbalized understanding.   Did the patient receive a copy of their discharge instructions? Yes   Nursing interventions Reviewed instructions with patient  [with daughter--Chapis]   What is the patient's perception of their health status since discharge? Worsening   Is the patient/caregiver able to teach back signs and symptoms related to disease process for when to call PCP? Yes   Is the patient/caregiver able to teach back signs and symptoms related to disease process for when to call 911? Yes   Is the patient/caregiver able to teach back the hierarchy of who to call/visit for symptoms/problems? PCP, Specialist, Home health nurse, Urgent Care, ED, 911 Yes   If the patient is a current smoker, are they able to teach back resources for cessation? Not a smoker   TCM call completed? Yes   Wrap up additional comments Daughter unable to get patient into the doctor office and is going to take patient back to ED for evaluation now due to urinary retention. Patient has not voided in over 24 hours.   Call end time 0949   Would this patient benefit from a Referral to [facility] Social Work? No   Is the patient interested in additional calls from an ambulatory ? No            Lauren HILL - Registered Nurse    7/11/2025, 09:54 EDT

## 2025-07-14 ENCOUNTER — TELEPHONE (OUTPATIENT)
Dept: INTERNAL MEDICINE | Facility: CLINIC | Age: 80
End: 2025-07-14
Payer: MEDICARE

## 2025-07-14 NOTE — TELEPHONE ENCOUNTER
Heavenly with [facility], left vm that they have received a referral on this patient. Will dr burgos follow care or defer to Hospice. Call them at [phone] ext [phone]

## 2025-07-14 NOTE — TELEPHONE ENCOUNTER
[facility]    Relationship: [facility]    Best call back number: [phone]     What orders are you requesting (i.e. lab or imaging): ORDERS TO DELAY START OF HOME HEALTH CARE 07.16.25    In what timeframe would the patient need to come in: ASAP    Where will you receive your lab/imaging services: HOME    Additional notes: VERBAL

## 2025-07-15 ENCOUNTER — PATIENT OUTREACH (OUTPATIENT)
Dept: CASE MANAGEMENT | Facility: OTHER | Age: 80
End: 2025-07-15
Payer: MEDICARE

## 2025-07-15 DIAGNOSIS — Z74.09 IMPAIRED FUNCTIONAL MOBILITY AND ENDURANCE: ICD-10-CM

## 2025-07-15 DIAGNOSIS — E11.51 TYPE 2 DIABETES MELLITUS WITH DIABETIC PERIPHERAL ANGIOPATHY WITHOUT GANGRENE, WITHOUT LONG-TERM CURRENT USE OF INSULIN: Primary | ICD-10-CM

## 2025-07-15 NOTE — OUTREACH NOTE
AMBULATORY CASE MANAGEMENT NOTE    Names and Relationships of Patient/Support Persons: Contact: [facility]; Relationship: Other -     Care Coordination    Care coordination with [facility]. ELIEZER PRINCE spoke with Joana who confirmed admission to Hospice on 07/14/25. Patient was a family referral to Hospice. ELIEZER PRINCE spoke with Laquata Hume, RN with Hospice who reported that Patient was demonstrating terminal restlessness and scheduled to be seen today. HRCM: Revoked; Hospice.    Wendy GUARDADO  Ambulatory Case Management    7/15/2025, 09:41 EDT

## 2025-07-15 NOTE — TELEPHONE ENCOUNTER
Called home health.  Left vm that Dr. Vaca was ok with giving verbal orders.  Gave them my direct line to call back if they needed anything further

## (undated) DEVICE — SUT VIC 3/0 SH 27IN J416H

## (undated) DEVICE — BNDG ELAS ELITE V/CLOSE 6IN 5YD LF STRL

## (undated) DEVICE — SUT ETHLN 4/0 PS2 PLSTC 1667G

## (undated) DEVICE — PK EXTRM UPPR 20

## (undated) DEVICE — FLEXIBLE YANKAUER,MEDIUM TIP, NO VACUUM CONTROL: Brand: ARGYLE

## (undated) DEVICE — NON-ADHERENT STRIPS,OIL EMULSION: Brand: CURITY

## (undated) DEVICE — SUT VIC 2/0 SH 27IN

## (undated) DEVICE — BNDG ELAS ELITE V/CLOSE 4IN 5YD LF STRL

## (undated) DEVICE — BNDG ELAS MATRX V/CLS 4IN 5YD LF

## (undated) DEVICE — Device

## (undated) DEVICE — SINGLE PORT MANIFOLD: Brand: NEPTUNE 2

## (undated) DEVICE — DISPOSABLE TOURNIQUET CUFF SINGLE BLADDER, SINGLE PORT AND QUICK CONNECT CONNECTOR: Brand: COLOR CUFF

## (undated) DEVICE — BANDAGE,GAUZE,CONFORMING,4"X75",STRL,LF: Brand: MEDLINE

## (undated) DEVICE — CLAVICLE STRAP: Brand: DEROYAL

## (undated) DEVICE — GLV SURG SENSICARE W/ALOE PF LF 8 STRL

## (undated) DEVICE — BIT DRL QC DIA W/DEPTHMARK 1.8X110MM

## (undated) DEVICE — DRSNG WND GZ CURAD OIL EMULSION 3X3IN STRL

## (undated) DEVICE — SPNG GZ WOVN 4X4IN 12PLY 10/BX STRL

## (undated) DEVICE — CAST PADDING 3"X4 YD KIT: Brand: CARDINAL HEALTH